# Patient Record
Sex: MALE | Race: WHITE | NOT HISPANIC OR LATINO | Employment: OTHER | URBAN - METROPOLITAN AREA
[De-identification: names, ages, dates, MRNs, and addresses within clinical notes are randomized per-mention and may not be internally consistent; named-entity substitution may affect disease eponyms.]

---

## 2017-01-12 ENCOUNTER — OFFICE VISIT (OUTPATIENT)
Dept: SLEEP MEDICINE | Facility: CLINIC | Age: 62
End: 2017-01-12
Attending: NURSE PRACTITIONER
Payer: COMMERCIAL

## 2017-01-12 VITALS
WEIGHT: 256 LBS | OXYGEN SATURATION: 95 % | SYSTOLIC BLOOD PRESSURE: 141 MMHG | HEIGHT: 70 IN | BODY MASS INDEX: 36.65 KG/M2 | HEART RATE: 81 BPM | DIASTOLIC BLOOD PRESSURE: 74 MMHG

## 2017-01-12 DIAGNOSIS — G47.33 OSA (OBSTRUCTIVE SLEEP APNEA): Primary | ICD-10-CM

## 2017-01-12 DIAGNOSIS — E66.9 OBESITY (BMI 35.0-39.9 WITHOUT COMORBIDITY): ICD-10-CM

## 2017-01-12 DIAGNOSIS — I10 ESSENTIAL HYPERTENSION: ICD-10-CM

## 2017-01-12 PROCEDURE — 99211 OFF/OP EST MAY X REQ PHY/QHP: CPT | Mod: ZF

## 2017-01-12 NOTE — NURSING NOTE
"Chief Complaint   Patient presents with     RECHECK     Follow up cpap       Initial There were no vitals taken for this visit. Estimated body mass index is 34.72 kg/(m^2) as calculated from the following:    Height as of 5/4/16: 1.778 m (5' 10\").    Weight as of 5/4/16: 109.77 kg (242 lb).  BP completed using cuff size: large left arm    BRO Thomas          "

## 2017-01-12 NOTE — PATIENT INSTRUCTIONS
Until you are seen again in clinic in the next 18 months, please watch for a return of sleepiness  is usually due to a change in the followin) a decrease in usage of cpap  2) decrease in amount of time slept  3) a poor seal (often a function of not following cleaning recommendations or  replacement guidelines)  4)  increase in weight or use of sedating medications resulting in higher pressure needs     If you have tried to address these issues but are still sleepy, please call for an earlier appointment.    Please, do not drive if sleepy    Your BMI is Body mass index is 36.73 kg/(m^2).- step up exercise  Weight management is a personal decision.  If you are interested in exploring weight loss strategies, the following discussion covers the approaches that may be successful. Body mass index (BMI) is one way to tell whether you are at a healthy weight, overweight, or obese. It measures your weight in relation to your height.  A BMI of 18.5 to 24.9 is in the healthy range. A person with a BMI of 25 to 29.9 is considered overweight, and someone with a BMI of 30 or greater is considered obese. More than two-thirds of American adults are considered overweight or obese.  Being overweight or obese increases the risk for further weight gain. Excess weight may lead to heart disease and diabetes.  Creating and following plans for healthy eating and physical activity may help you improve your health.  Weight control is part of healthy lifestyle and includes exercise, emotional health, and healthy eating habits. Careful eating habits lifelong are the mainstay of weight control. Though there are significant health benefits from weight loss, long-term weight loss with diet alone may be very difficult to achieve- studies show long-term success with dietary management in less than 10% of people. Attaining a healthy weight may be especially difficult to achieve in those with severe obesity. In some cases, medications,  devices and surgical management might be considered.  What can you do?  If you are overweight or obese and are interested in methods for weight loss, you should discuss this with your provider.     Consider reducing daily calorie intake by 500 calories.     Keep a food journal.     Avoiding skipping meals, consider cutting portions instead.    Diet combined with exercise helps maintain muscle while optimizing fat loss. Strength training is particularly important for building and maintaining muscle mass. Exercise helps reduce stress, increase energy, and improves fitness. Increasing exercise without diet control, however, may not burn enough calories to loose weight.       Start walking three days a week 10-20 minutes at a time    Work towards walking thirty minutes five days a week     Eventually, increase the speed of your walking for 1-2 minutes at time    In addition, we recommend that you review healthy lifestyles and methods for weight loss available through the National Institutes of Health patient information sites:  http://win.niddk.nih.gov/publications/index.htm    And look into health and wellness programs that may be available through your health insurance provider, employer, local community center, or kristin club.    Weight management plan: Patient was referred to their PCP to discuss a diet and exercise plan.

## 2017-01-12 NOTE — PROGRESS NOTES
The patient has an annual checkup for his supplies for his obstructive sleep apnea.  He was studied in 10/09/2007 and was noted to have an AHI of 51, a supine AHI of 60.4 and the minimum O2 sat of 58%.  His PLM arousal index was 1.9.  On 10/28/2007, he had a titration study.  His residual AHI was 22.2, but he had significant problems with leak.  His lowest O2 sat was less than 84% with a mean of 94%.  He was placed on auto PAP therapy at that time and eventually settled on a set pressure of 13 cm of water.  He presents here today on his second CPAP set at a set pressure of 13 and wishing to have a prescription since he has moved here from [facility].    His download today does show 28 out of 30 days of use, 2 days with no use; however, he states that that was on his old machine which he has been keeping at his other home until their home was sold.  Average use days used is 8 hours and 24 minutes.  Time in large leak 21 minutes.  Average AHI 0.7.  He is currently on a CPAP pressure of 13.          REVIEW OF SLEEP STUDY:  He weighed 228 pounds following his sleep follow up and at his current visit today 239 pounds with a BMI of 34.36.        ASSESSMENT AND PLAN:  It is my impression that Mr. Marquis is doing quite well on his CPAP therapy, in fact he cannot sleep without this.  I have ordered for him to obtain his equipment from [facility] again since they have been handling his equipment since he has been placed on CPAP and he is happy with their service.  He also wishes to have followup at the [place] office since he does not live on this side of town and that is perfectly fine and well.  I have encouraged him to look for signs of loss of benefit of CPAP, obstructive sleep apnea coverage and the following plan of care has been developed.    1.  Obstructive sleep apnea, severe.  Well covered with his current CPAP set the setting of 13 and will follow up in [place] in 1 year.  [facility] as his DME and he has  a prescription for their services.  I have encouraged him to look for loss of benefit and if he is not able to remedy some of these conditions such as insufficient sleep or return of symptoms of obstructive sleep apnea syndrome he is to contact us.    2.  Obesity.  We did discuss the link between obesity and the development of sleep apnea as well as higher pressures needed to treat this condition.  He is encouraged to work with his primary care provider to address this, especially since he will be retired now.  I have provided her recommendation of the [facility] to see if he would be interested in their services and he is not ready at this time.            HPI: severe moderate ,mild GREGORY.  Baseline symptoms were:   .  PSG/HSAT  Comorbidities: see below.  Sleep comorbidities     New concerns: new prescription mask, headgear (and hard copy) wisp    Sleep Review of Systems:     Snoring, snort arousals, gasping while on therapy: no     Bedpartner c/o's of cpap: no     Heated humidity problems with rainout/excessive dryness, sore throat: hh needs water     Opening of mouth while on therapy/excessive leak: no     Swallowing air: no     Skin problems: no     Insufficient sleep, devoting <7 or more hours to sleep: no     Problems falling or staying asleep with cpap: no     RLS: no    SLEEP SCHEDULE:    Naps: sometimes    Response to therapy:good sleep  Willingness to continue: yes    Equipment issues: no    Download today:    Full report scanned into medical record        A/P:      1. GREGORY  2. Obesity:  3. Hypertension pcp    Time spent with patient is 25 minutes, of which >50% was spent in counseling, education and coordination of care.

## 2017-01-14 NOTE — PROGRESS NOTES
SLEEP MEDICINE CLINIC       DATE OF SLEEP CLINIC VISIT:  01/12/2017.      LOCATION:  [facility].       CHIEF COMPLAINT:  Werner Alvarez is returning for his annual evaluation of his obstructive sleep apnea and to further develop a plan of care.      HISTORY OF PRESENT ILLNESS:  Mr. Alvarez is a 61-year-old gentleman with a previous polysomnogram done in 10/2007 noted to have an AHI of 51, supine AHI of 60 and lowest O2 sat of 58%.  On 10/28/2007 he had a titration study.  His residual AHI was 22.2, but he had significant problems with leak.  He was placed on auto Pap and at that time and eventually settled on a set pressure of 13 cm.  He has had problems with obesity.  I have provided him with a recommendation to see if the [place] [facility] could help him with weight loss.  He returns here today for request for new prescription for supplies currently through [facility] and then to get a hard copy.   New concerns: new prescription mask, headgear (and hard copy) wisp    Sleep Review of Systems:     Snoring, snort arousals, gasping while on therapy: no     Bedpartner c/o's of cpap: no     Heated humidity problems with rainout/excessive dryness, sore throat: hh needs water     Opening of mouth while on therapy/excessive leak: no     Swallowing air: no     Skin problems: no     Insufficient sleep, devoting <7 or more hours to sleep: no     Problems falling or staying asleep with cpap: no     RLS: no    SLEEP SCHEDULE:    Naps: sometimes    Response to therapy:good sleep  Willingness to continue: yes    Equipment issues: no    Allergies:    Allergies   Allergen Reactions     Penicillins Unknown     Since childhood       Medications:    Current Outpatient Prescriptions   Medication Sig Dispense Refill     sildenafil (VIAGRA) 100 MG tablet Take 0.5-1 tablets ( mg) by mouth daily as needed for erectile dysfunction Take 30 min to 4 hours before intercourse. 6  "tablet 11     order for DME Use your CPAP device as directed by your provider.         Problem List:  Patient Active Problem List    Diagnosis Date Noted     Erectile dysfunction, unspecified erectile dysfunction type 09/30/2016     Priority: Medium     Obesity (BMI 30.0-34.9) 07/29/2015     Priority: Medium     Obstructive sleep apnea 07/29/2015     Priority: Medium     Problem list name updated by automated process. Provider to review       Preventive measure 05/04/2016     Hx of yearly CPE; not yearly PSA; 2.66 in 5/16;                     Colonoscopy neg in 2015; recheck in 10 yrs.          Tobacco abuse 05/04/2016     Off and on for many years; quit 2014.      approx 20 pack yrs          Past Medical/Surgical History:  History reviewed. No pertinent past medical history.  Past Surgical History   Procedure Laterality Date     Arthroscopy knee rt/lt Bilateral            Physical Examination:  Vitals: /74 mmHg  Pulse 81  Ht 1.778 m (5' 10\")  Wt 116.121 kg (256 lb)  BMI 36.73 kg/m2  SpO2 95%  BMI= Body mass index is 36.73 kg/(m^2).         [place] Total Score 1/12/2017   Total score - [place] 1     GENERAL APPEARANCE: healthy, alert and no distress     Download today shows an AHI residual of 0.6 on a CPAP at 13 cm of water.  Average usage on days used 8 hours and 47 minutes.  Average time in large leak is 6 seconds.  Use appears to be excellent.      ASSESSMENT AND PLAN:  It is my impression that Mr. Alvarez is having an excellent response and showing high compliance with his CPAP therapy.  I have agreed to see him back in the next 18 months and the following plan of care has been developed:   1.  Obstructive sleep apnea, severe.  Followup in 18-months.  He has been given a set of recommendations for further trial should he have a return of sleepiness.  If he is not able to improve this situation on his own, he is to call for an earlier appointment.  He is also instructed to not drive if sleepy.   2. "  Obesity.  I have suggested he find a resources through which he would like to exercise and stick with that at this time.  His weight has gone up over the last time he was seen by about 17 pounds.   3.  Hypertension.  His blood pressure is just up slightly today and I will update Dr. Zavala.      Thank you for allowing me to participate in this kind man's care.      Time spent with patient 25 minutes, of which greater than 50% was spent in counseling, education and coordination of care.         RONNIE DOCKERY, CNP             D: 2017 18:10   T: 2017 11:37   MT: JONEL      Name:     MIKE CRAWFORD   MRN:      [id number]        Account:      [id number]   :      1955           Visit Date:   2017      Document: [id number]

## 2017-01-17 ENCOUNTER — DOCUMENTATION ONLY (OUTPATIENT)
Dept: SLEEP MEDICINE | Facility: CLINIC | Age: 62
End: 2017-01-17

## 2017-01-17 NOTE — PROGRESS NOTES
Patient was offered choice of vendor and chose [facility].  Patient Werner Alvarez was set up at [place] on January 17, 2017. Patient received a Resmed AirSense 10 CPAP. Pressures were set at 13 cm H2O.   Patient s ramp is 7 cm H2O for Off and FLEX/EPR is EPR.  Patient received a [employer] Mask name: WISP  Nasal mask Size Large, heated tubing and heated humidifier.  Patient is not enrolled in the STM Program and does need to meet compliance. Patient has a follow up on 03/02/2017 with Elaine Cruz NP.    Romario Sidhu

## 2017-01-18 ENCOUNTER — DOCUMENTATION ONLY (OUTPATIENT)
Dept: SLEEP MEDICINE | Facility: CLINIC | Age: 62
End: 2017-01-18

## 2017-01-18 NOTE — PROGRESS NOTES
Werner Alvarez was set up with PAP equipment by [facility] and is enrolled in [facility].  See previous documentation by [facility] for equipment and supply details.

## 2017-01-23 ENCOUNTER — DOCUMENTATION ONLY (OUTPATIENT)
Dept: SLEEP MEDICINE | Facility: CLINIC | Age: 62
End: 2017-01-23

## 2017-01-23 NOTE — PROGRESS NOTES
3 DAY STM VISIT    Patient contacted for 3 day STM visit  Subjective measures:  Pt reports things are going very well so far.  No issues with dryness, pressure or mask discomfort.     Current settings: 13 cm H20       Assessment:  Nightly usage over four hours.   Action plan: Pt to have f/u 14 day  STM visit.

## 2017-02-02 ENCOUNTER — DOCUMENTATION ONLY (OUTPATIENT)
Dept: SLEEP MEDICINE | Facility: CLINIC | Age: 62
End: 2017-02-02

## 2017-02-02 NOTE — PROGRESS NOTES
14 DAY STM VISIT    Subjective measures:  Pt states things are going well and has no issues or complaints.  Pt is benefiting from therapy.    Assessment: Pt meeting objective benchmarks.  Patient meeting subjective benchmarks.   Action plan: Pt to have 30 day STM visit.   Device settings:  CPAP: 13 cm H2O       Objective measures: 14 day rolling measure      Compliance   (Goal >70%)  --% compliance greater than four hours rolling average 14 days: 85.7%      Leak  (Goal < 24 lpm)  --95% OF Leak in litres Rolling Average 14 Days: 17.17 lpm last data upload         AHI  (Goal < 5)  --AHI Rolling Average 14 Day: 1.68  last data upload         Usage  (Goal >240)  --Time mask on face 14 day average: 460 min

## 2017-02-20 ENCOUNTER — DOCUMENTATION ONLY (OUTPATIENT)
Dept: SLEEP MEDICINE | Facility: CLINIC | Age: 62
End: 2017-02-20

## 2017-02-20 NOTE — PROGRESS NOTES
[facility]    Message left for patient to return call     Assessment: Pt meeting objective benchmarks.     Action plan: Waiting for patient to return call and Pt to have 6 month STM visit  Patient has a follow up visit with Elaine Cruz NP on 3/2/17.   Device settings:  CPAP:[id number]    Objective measures: 14 day rolling measures     Compliance   (Goal >70%)  --% compliance greater than four hours rolling average 14 days: 100 %      Leak   (Goal < 24 lpm) --95% OF Leak in litres Rolling Average 14 Days: 21.2 lpm  last data upload      AHI  (Goal < 5)  --AHI Rolling Average 14 Day: 1.07 last data upload        Usage  (Goal >240)  --Time mask on face 14 day average: 526 min

## 2017-03-02 ENCOUNTER — OFFICE VISIT (OUTPATIENT)
Dept: SLEEP MEDICINE | Facility: CLINIC | Age: 62
End: 2017-03-02
Attending: NURSE PRACTITIONER
Payer: COMMERCIAL

## 2017-03-02 VITALS
HEART RATE: 90 BPM | RESPIRATION RATE: 20 BRPM | DIASTOLIC BLOOD PRESSURE: 69 MMHG | HEIGHT: 70 IN | OXYGEN SATURATION: 96 % | BODY MASS INDEX: 37.37 KG/M2 | SYSTOLIC BLOOD PRESSURE: 130 MMHG | WEIGHT: 261 LBS

## 2017-03-02 DIAGNOSIS — E66.811 OBESITY (BMI 30.0-34.9): ICD-10-CM

## 2017-03-02 DIAGNOSIS — G47.33 OBSTRUCTIVE SLEEP APNEA: Primary | ICD-10-CM

## 2017-03-02 NOTE — NURSING NOTE
"Chief Complaint   Patient presents with     RECHECK     Follow up cpap       Initial Ht 1.778 m (5' 10\")  Wt 118.4 kg (261 lb)  BMI 37.45 kg/m2 Estimated body mass index is 37.45 kg/(m^2) as calculated from the following:    Height as of this encounter: 1.778 m (5' 10\").    Weight as of this encounter: 118.4 kg (261 lb).  Medication Reconciliation: complete     BRO Thomas        "

## 2017-03-02 NOTE — PATIENT INSTRUCTIONS
Until you are seen again in clinic in the next 12 months, please watch for a return of sleepiness is usually due to a change in the followin) a decrease in usage of cpap  2) decrease in amount of time slept  3) a poor seal (often a function of not following cleaning recommendations or  replacement guidelines)  4)  increase in weight or use of sedating medications resulting in higher pressure needs     If you have tried to address these issues but are still sleepy, please call for an earlier appointment.    Please, do not drive if sleepy    Your BMI is Body mass index is 37.45 kg/(m^2).  Weight management is a personal decision.  If you are interested in exploring weight loss strategies, the following discussion covers the approaches that may be successful. Body mass index (BMI) is one way to tell whether you are at a healthy weight, overweight, or obese. It measures your weight in relation to your height.  A BMI of 18.5 to 24.9 is in the healthy range. A person with a BMI of 25 to 29.9 is considered overweight, and someone with a BMI of 30 or greater is considered obese. More than two-thirds of American adults are considered overweight or obese.  Being overweight or obese increases the risk for further weight gain. Excess weight may lead to heart disease and diabetes.  Creating and following plans for healthy eating and physical activity may help you improve your health.  Weight control is part of healthy lifestyle and includes exercise, emotional health, and healthy eating habits. Careful eating habits lifelong are the mainstay of weight control. Though there are significant health benefits from weight loss, long-term weight loss with diet alone may be very difficult to achieve- studies show long-term success with dietary management in less than 10% of people. Attaining a healthy weight may be especially difficult to achieve in those with severe obesity. In some cases, medications, devices and surgical  management might be considered.  What can you do?  If you are overweight or obese and are interested in methods for weight loss, you should discuss this with your provider.     Consider reducing daily calorie intake by 500 calories.     Keep a food journal.     Avoiding skipping meals, consider cutting portions instead.    Diet combined with exercise helps maintain muscle while optimizing fat loss. Strength training is particularly important for building and maintaining muscle mass. Exercise helps reduce stress, increase energy, and improves fitness. Increasing exercise without diet control, however, may not burn enough calories to loose weight.       Start walking three days a week 10-20 minutes at a time    Work towards walking thirty minutes five days a week     Eventually, increase the speed of your walking for 1-2 minutes at time    In addition, we recommend that you review healthy lifestyles and methods for weight loss available through the National Institutes of Health patient information sites:  http://win.niddk.nih.gov/publications/index.htm    And look into health and wellness programs that may be available through your health insurance provider, employer, local community center, or [employer]ristin club.    Weight management plan: Patient was referred to their PCP to discuss a diet and exercise plan.

## 2017-03-02 NOTE — PROGRESS NOTES
"Allergies:    Allergies   Allergen Reactions     Penicillins Unknown     Since childhood       Medications:    Current Outpatient Prescriptions   Medication Sig Dispense Refill     order for DME [id number] CPAP  13 CM H2O  WISP NASAL MASK (LARGE)       sildenafil (VIAGRA) 100 MG tablet Take 0.5-1 tablets ( mg) by mouth daily as needed for erectile dysfunction Take 30 min to 4 hours before intercourse. 6 tablet 11     order for DME Use your CPAP device as directed by your provider.         Problem List:  Patient Active Problem List    Diagnosis Date Noted     Erectile dysfunction, unspecified erectile dysfunction type 09/30/2016     Priority: Medium     Obesity (BMI 30.0-34.9) 07/29/2015     Priority: Medium     Obstructive sleep apnea 07/29/2015     Priority: Medium     Problem list name updated by automated process. Provider to review       Preventive measure 05/04/2016     Hx of yearly CPE; not yearly PSA; 2.66 in 5/16;                     Colonoscopy neg in 2015; recheck in 10 yrs.          Tobacco abuse 05/04/2016     Off and on for many years; quit 2014.      approx 20 pack yrs          Past Medical/Surgical History:  No past medical history on file.  Past Surgical History   Procedure Laterality Date     Arthroscopy knee rt/lt Bilateral            Physical Examination:  Vitals: /69 (BP Location: Right arm, Patient Position: Supine, Cuff Size: Adult Large)  Pulse 90  Resp 20  Ht 1.778 m (5' 10\")  Wt 118.4 kg (261 lb)  SpO2 96%  BMI 37.45 kg/m2  BMI= Body mass index is 37.45 kg/(m^2).         [place] Total Score 3/2/2017   Total score - [place] 0       GENERAL APPEARANCE: healthy, alert and no distress    "

## 2017-03-02 NOTE — MR AVS SNAPSHOT
After Visit Summary   3/2/2017    Werner Alvarez    MRN: [id number]           Patient Information     Date Of Birth          1955        Visit Information        Provider Department      3/2/2017 2:30 PM Elaine Davila APRN [facility], [place], Sleep Study        Today's Diagnoses     Obstructive sleep apnea    -  1    Obesity (BMI 30.0-34.9)          Care Instructions      Until you are seen again in clinic in the next 12 months, please watch for a return of sleepiness is usually due to a change in the followin) a decrease in usage of cpap  2) decrease in amount of time slept  3) a poor seal (often a function of not following cleaning recommendations or  replacement guidelines)  4)  increase in weight or use of sedating medications resulting in higher pressure needs     If you have tried to address these issues but are still sleepy, please call for an earlier appointment.    Please, do not drive if sleepy    Your BMI is Body mass index is 37.45 kg/(m^2).  Weight management is a personal decision.  If you are interested in exploring weight loss strategies, the following discussion covers the approaches that may be successful. Body mass index (BMI) is one way to tell whether you are at a healthy weight, overweight, or obese. It measures your weight in relation to your height.  A BMI of 18.5 to 24.9 is in the healthy range. A person with a BMI of 25 to 29.9 is considered overweight, and someone with a BMI of 30 or greater is considered obese. More than two-thirds of American adults are considered overweight or obese.  Being overweight or obese increases the risk for further weight gain. Excess weight may lead to heart disease and diabetes.  Creating and following plans for healthy eating and physical activity may help you improve your health.  Weight control is part of healthy lifestyle and includes exercise, emotional health, and healthy eating habits. Careful eating habits  lifelong are the mainstay of weight control. Though there are significant health benefits from weight loss, long-term weight loss with diet alone may be very difficult to achieve- studies show long-term success with dietary management in less than 10% of people. Attaining a healthy weight may be especially difficult to achieve in those with severe obesity. In some cases, medications, devices and surgical management might be considered.  What can you do?  If you are overweight or obese and are interested in methods for weight loss, you should discuss this with your provider.     Consider reducing daily calorie intake by 500 calories.     Keep a food journal.     Avoiding skipping meals, consider cutting portions instead.    Diet combined with exercise helps maintain muscle while optimizing fat loss. Strength training is particularly important for building and maintaining muscle mass. Exercise helps reduce stress, increase energy, and improves fitness. Increasing exercise without diet control, however, may not burn enough calories to loose weight.       Start walking three days a week 10-20 minutes at a time    Work towards walking thirty minutes five days a week     Eventually, increase the speed of your walking for 1-2 minutes at time    In addition, we recommend that you review healthy lifestyles and methods for weight loss available through the National Institutes of Health patient information sites:  http://win.niddk.nih.gov/publications/index.htm    And look into health and wellness programs that may be available through your health insurance provider, employer, local community center, or kristin club.    Weight management plan: Patient was referred to their PCP to discuss a diet and exercise plan.            Follow-ups after your visit        Follow-up notes from your care team     Return in about 1 year (around 3/2/2018).      Who to contact     If you have questions or need follow up information about today's  "clinic visit or your schedule please contact [facility] directly at [phone].  Normal or non-critical lab and imaging results will be communicated to you by MyChart, letter or phone within 4 business days after the clinic has received the results. If you do not hear from us within 7 days, please contact the clinic through Vedant[employer] or phone. If you have a critical or abnormal lab result, we will notify you by phone as soon as possible.  Submit refill requests through Quixby or call your pharmacy and they will forward the refill request to us. Please allow 3 business days for your refill to be completed.          Additional Information About Your Visit        [employer] Information     Qu[employer] gives you secure access to your electronic health record. If you see a primary care provider, you can also send messages to your care team and make appointments. If you have questions, please call your primary care clinic.  If you do not have a primary care provider, please call [phone] and they will assist you.        Care EveryWhere ID     This is your Care EveryWhere ID. This could be used by other organizations to access your [place] medical records  [id number]        Your Vitals Were     Pulse Respirations Height Pulse Oximetry BMI (Body Mass Index)       90 20 1.778 m (5' 10\") 96% 37.45 kg/m2        Blood Pressure from Last 3 Encounters:   03/02/17 130/69   01/12/17 141/74   05/04/16 108/68    Weight from Last 3 Encounters:   03/02/17 118.4 kg (261 lb)   01/12/17 116.1 kg (256 lb)   05/04/16 109.8 kg (242 lb)              Today, you had the following     No orders found for display       Primary Care Provider Office Phone # Fax #    Guzman Zavala [phone]       [facility] [address]  [facility] [address]        Thank you!     Thank you for choosing [facility]  for your care. Our goal is always to provide you with excellent care. " Hearing back from our patients is one way we can continue to improve our services. Please take a few minutes to complete the written survey that you may receive in the mail after your visit with us. Thank you!             Your Updated Medication List - Protect others around you: Learn how to safely use, store and throw away your medicines at www.disposemymeds.org.          This list is accurate as of: 3/2/17 11:59 PM.  Always use your most recent med list.                   Brand Name Dispense Instructions for use    order for DME      Use your CPAP device as directed by your provider.       order for DME      [id number] CPAP 13 CM H2O WISP NASAL MASK (LARGE)       sildenafil 100 MG cap/tab    VIAGRA    6 tablet    Take 0.5-1 tablets ( mg) by mouth daily as needed for erectile dysfunction Take 30 min to 4 hours before intercourse.

## 2017-03-03 NOTE — PROGRESS NOTES
DATE OF SERVICE:  03/02/2017      [facility]      CHIEF COMPLAINT:  The patient returns today to evaluate compliance and response to CPAP therapy.      HISTORY OF PRESENT ILLNESS:  Mr. Werner Alvarez is a 61-year-old male with a 10/2007 polysomnogram revealing an AHI of 51, supine AHI of 60 and lowest O2 sat of 58%.  On 10/28/2007 he had a titration study.  His residual AHI was 22.2, but he had significant problems with leak.  Previous DME was [employer], but chose to [facility] for his new Apap at a set pressure of 13 cm of water set up on 01/172017. He was seen by me on 1/12/17 without difficulties with therapy.  Returns here today to evaluate compliance and response to CPAP.  He has no complaints of insufficient pressure, problems with congestion or humidification  Sleep schedule was quite regular.  Rare naps.  Sleep is good, and he was willing to continue CPAP therapy for his severe obstructive sleep apnea.      Download today:  In the past 30 days 100% of these days shows use greater than 4 hours.  Average use on days used 8 hours and 31 minutes.  He is on CPAP at a set pressure of 13 cm with a residual AHI of 1.2.  Leaks at the 95th percentile were 21.8.  I review a detailed report that shows some minimal spikes of leak which appear to be with change of position and is not a problem with his comfort.     Allergies:    Allergies   Allergen Reactions     Penicillins Unknown     Since childhood       Medications:    Current Outpatient Prescriptions   Medication Sig Dispense Refill     order for DME [id number] CPAP  13 CM H2O  WISP NASAL MASK (LARGE)       sildenafil (VIAGRA) 100 MG tablet Take 0.5-1 tablets ( mg) by mouth daily as needed for erectile dysfunction Take 30 min to 4 hours before intercourse. 6 tablet 11     order for DME Use your CPAP device as directed by your provider.         Problem List:  Patient Active Problem List    Diagnosis Date Noted     Erectile  "dysfunction, unspecified erectile dysfunction type 09/30/2016     Priority: Medium     Obesity (BMI 30.0-34.9) 07/29/2015     Priority: Medium     Obstructive sleep apnea 07/29/2015     Priority: Medium     Problem list name updated by automated process. Provider to review       Preventive measure 05/04/2016     Hx of yearly CPE; not yearly PSA; 2.66 in 5/16;                     Colonoscopy neg in 2015; recheck in 10 yrs.          Tobacco abuse 05/04/2016     Off and on for many years; quit 2014.      approx 20 pack yrs          Past Medical/Surgical History:  No past medical history on file.  Past Surgical History   Procedure Laterality Date     Arthroscopy knee rt/lt Bilateral            Physical Examination:  Vitals: /69 (BP Location: Right arm, Patient Position: Supine, Cuff Size: Adult Large)  Pulse 90  Resp 20  Ht 1.778 m (5' 10\")  Wt 118.4 kg (261 lb)  SpO2 96%  BMI 37.45 kg/m2  BMI= Body mass index is 37.45 kg/(m^2).         Wideman Total Score 3/2/2017   Total score - Wideman 0       GENERAL APPEARANCE: healthy, alert and no distress    ASSESSMENT AND PLAN:  It is my impression that Mr. Alvarez is having excellent response with treatment of his obstructive sleep apnea.  He will follow up with me in the next in 12 months' time, sooner if he has problems with return of daytime sleepiness or other symptoms that would indicate insufficient coverage of his apnea.   1.  Obstructive sleep apnea, severe, with excellent coverage of his apnea.   2.  Obesity.  He was recently given a referral to [facility].  Unfortunately he has had a slight increase in weight since that time and is encouraged to return to his exercise routine and to seek their guidance.        Time spent with patient 25 minutes, of which greater than 50% was spent in counseling, education and coordination of care.         RONNIE DOCKERY, CNP             D: 03/03/2017 07:34   T: 03/03/2017 07:50   MT: sandie      Name:    "  MIKE CRAWFORD   MRN:      [id number]        Account:      [id number]   :      1955           Visit Date:   2017      Document: [id number]

## 2017-06-27 ENCOUNTER — OFFICE VISIT (OUTPATIENT)
Dept: FAMILY MEDICINE | Facility: CLINIC | Age: 62
End: 2017-06-27
Payer: COMMERCIAL

## 2017-06-27 VITALS
SYSTOLIC BLOOD PRESSURE: 124 MMHG | BODY MASS INDEX: 34.07 KG/M2 | OXYGEN SATURATION: 96 % | DIASTOLIC BLOOD PRESSURE: 66 MMHG | RESPIRATION RATE: 20 BRPM | WEIGHT: 238 LBS | TEMPERATURE: 98.8 F | HEART RATE: 75 BPM | HEIGHT: 70 IN

## 2017-06-27 DIAGNOSIS — N52.9 ERECTILE DYSFUNCTION, UNSPECIFIED ERECTILE DYSFUNCTION TYPE: ICD-10-CM

## 2017-06-27 DIAGNOSIS — Z29.9 PREVENTIVE MEASURE: ICD-10-CM

## 2017-06-27 DIAGNOSIS — Z00.00 ROUTINE GENERAL MEDICAL EXAMINATION AT A HEALTH CARE FACILITY: Primary | ICD-10-CM

## 2017-06-27 DIAGNOSIS — Z72.0 TOBACCO ABUSE: ICD-10-CM

## 2017-06-27 DIAGNOSIS — G47.33 OBSTRUCTIVE SLEEP APNEA: ICD-10-CM

## 2017-06-27 DIAGNOSIS — E66.811 OBESITY (BMI 30.0-34.9): ICD-10-CM

## 2017-06-27 LAB
ALBUMIN SERPL-MCNC: 4.1 G/DL (ref 3.4–5)
ALBUMIN UR-MCNC: NEGATIVE MG/DL
ALP SERPL-CCNC: 68 U/L (ref 40–150)
ALT SERPL W P-5'-P-CCNC: 52 U/L (ref 0–70)
ANION GAP SERPL CALCULATED.3IONS-SCNC: 8 MMOL/L (ref 3–14)
APPEARANCE UR: CLEAR
AST SERPL W P-5'-P-CCNC: 23 U/L (ref 0–45)
BASOPHILS # BLD AUTO: 0 10E9/L (ref 0–0.2)
BASOPHILS NFR BLD AUTO: 0.5 %
BILIRUB SERPL-MCNC: 0.7 MG/DL (ref 0.2–1.3)
BILIRUB UR QL STRIP: NEGATIVE
BUN SERPL-MCNC: 15 MG/DL (ref 7–30)
CALCIUM SERPL-MCNC: 9.3 MG/DL (ref 8.5–10.1)
CHLORIDE SERPL-SCNC: 107 MMOL/L (ref 94–109)
CHOLEST SERPL-MCNC: 183 MG/DL
CO2 SERPL-SCNC: 26 MMOL/L (ref 20–32)
COLOR UR AUTO: YELLOW
CREAT SERPL-MCNC: 0.96 MG/DL (ref 0.66–1.25)
DIFFERENTIAL METHOD BLD: NORMAL
EOSINOPHIL # BLD AUTO: 0.1 10E9/L (ref 0–0.7)
EOSINOPHIL NFR BLD AUTO: 1.8 %
ERYTHROCYTE [DISTWIDTH] IN BLOOD BY AUTOMATED COUNT: 13.7 % (ref 10–15)
GFR SERPL CREATININE-BSD FRML MDRD: 80 ML/MIN/1.7M2
GLUCOSE SERPL-MCNC: 95 MG/DL (ref 70–99)
GLUCOSE UR STRIP-MCNC: NEGATIVE MG/DL
HCT VFR BLD AUTO: 46.1 % (ref 40–53)
HDLC SERPL-MCNC: 51 MG/DL
HGB BLD-MCNC: 15.5 G/DL (ref 13.3–17.7)
HGB UR QL STRIP: NEGATIVE
KETONES UR STRIP-MCNC: NEGATIVE MG/DL
LDLC SERPL CALC-MCNC: 113 MG/DL
LEUKOCYTE ESTERASE UR QL STRIP: NEGATIVE
LYMPHOCYTES # BLD AUTO: 2.2 10E9/L (ref 0.8–5.3)
LYMPHOCYTES NFR BLD AUTO: 35.1 %
MCH RBC QN AUTO: 31.4 PG (ref 26.5–33)
MCHC RBC AUTO-ENTMCNC: 33.6 G/DL (ref 31.5–36.5)
MCV RBC AUTO: 94 FL (ref 78–100)
MONOCYTES # BLD AUTO: 0.6 10E9/L (ref 0–1.3)
MONOCYTES NFR BLD AUTO: 10.3 %
NEUTROPHILS # BLD AUTO: 3.2 10E9/L (ref 1.6–8.3)
NEUTROPHILS NFR BLD AUTO: 52.3 %
NITRATE UR QL: NEGATIVE
NONHDLC SERPL-MCNC: 132 MG/DL
PH UR STRIP: 5.5 PH (ref 5–7)
PLATELET # BLD AUTO: 221 10E9/L (ref 150–450)
POTASSIUM SERPL-SCNC: 4.1 MMOL/L (ref 3.4–5.3)
PROT SERPL-MCNC: 7.8 G/DL (ref 6.8–8.8)
PSA SERPL-ACNC: 2.86 UG/L (ref 0–4)
RBC # BLD AUTO: 4.93 10E12/L (ref 4.4–5.9)
SODIUM SERPL-SCNC: 141 MMOL/L (ref 133–144)
SP GR UR STRIP: 1.02 (ref 1–1.03)
TRIGL SERPL-MCNC: 97 MG/DL
URN SPEC COLLECT METH UR: NORMAL
UROBILINOGEN UR STRIP-ACNC: 0.2 EU/DL (ref 0.2–1)
WBC # BLD AUTO: 6.1 10E9/L (ref 4–11)

## 2017-06-27 PROCEDURE — 80061 LIPID PANEL: CPT | Performed by: INTERNAL MEDICINE

## 2017-06-27 PROCEDURE — 36415 COLL VENOUS BLD VENIPUNCTURE: CPT | Performed by: INTERNAL MEDICINE

## 2017-06-27 PROCEDURE — 81003 URINALYSIS AUTO W/O SCOPE: CPT | Performed by: INTERNAL MEDICINE

## 2017-06-27 PROCEDURE — G0103 PSA SCREENING: HCPCS | Performed by: INTERNAL MEDICINE

## 2017-06-27 PROCEDURE — 99396 PREV VISIT EST AGE 40-64: CPT | Performed by: INTERNAL MEDICINE

## 2017-06-27 PROCEDURE — 80053 COMPREHEN METABOLIC PANEL: CPT | Performed by: INTERNAL MEDICINE

## 2017-06-27 PROCEDURE — 85025 COMPLETE CBC W/AUTO DIFF WBC: CPT | Performed by: INTERNAL MEDICINE

## 2017-06-27 NOTE — NURSING NOTE
"Chief Complaint   Patient presents with     Physical       Initial /66 (BP Location: Left arm, Patient Position: Chair, Cuff Size: Adult Large)  Pulse 75  Temp 98.8  F (37.1  C)  Resp 20  Ht 5' 10\" (1.778 m)  Wt 238 lb (108 kg)  SpO2 96%  BMI 34.15 kg/m2 Estimated body mass index is 34.15 kg/(m^2) as calculated from the following:    Height as of this encounter: 5' 10\" (1.778 m).    Weight as of this encounter: 238 lb (108 kg).  Medication Reconciliation: complete   Madonna Burton LPN  "

## 2017-06-27 NOTE — PATIENT INSTRUCTIONS
I will let you know your lab results.              Please resume taking aspirin 81 mg per day.   Please call and set up the CT scan of your chest.

## 2017-06-27 NOTE — MR AVS SNAPSHOT
After Visit Summary   6/27/2017    Werner Alvarez    MRN: [id number]           Patient Information     Date Of Birth          1955        Visit Information        Provider Department      6/27/2017 7:30 AM Guzman Zavala MD [facility]        Today's Diagnoses     Routine general medical examination at a health care facility    -  1    Tobacco abuse        Erectile dysfunction, unspecified erectile dysfunction type        Obesity (BMI 30.0-34.9)          Care Instructions    I will let you know your lab results.              Please resume taking aspirin 81 mg per day.   Please call and set up the CT scan of your chest.           Follow-ups after your visit        Additional Services     RADIOLOGY REFERRAL       Your provider has referred you to: [facility]: [facility] [phone]   [email or web address]                Please do a screening CT of the chest; no contrast.         Long time smoker.                         Please be aware that coverage of these services is subject to the terms and limitations of your health insurance plan.  Call member services at your health plan with any benefit or coverage questions.      Please bring the following to your appointment:    >>   Any x-rays, CTs or MRIs which have been performed.  Contact the facility where they were done to arrange for  prior to your scheduled appointment.    >>   List of current medications   >>   This referral request   >>   Any documents/labs given to you for this referral    Prior authorization is required for MRI/MRA, CT, Dexa Scans and Worker's Compensation cases.                  Who to contact     If you have questions or need follow up information about today's clinic visit or your schedule please contact [facility] directly at [phone].  Normal or non-critical lab and imaging results will be communicated to you by MyChart, letter or phone  "within 4 business days after the clinic has received the results. If you do not hear from us within 7 days, please contact the clinic through [employer] or phone. If you have a critical or abnormal lab result, we will notify you by phone as soon as possible.  Submit refill requests through [employer] or call your pharmacy and they will forward the refill request to us. Please allow 3 business days for your refill to be completed.          Additional Information About Your Visit        [employer] Information     [employer] gives you secure access to your electronic health record. If you see a primary care provider, you can also send messages to your care team and make appointments. If you have questions, please call your primary care clinic.  If you do not have a primary care provider, please call [phone] and they will assist you.        Care EveryWhere ID     This is your Care EveryWhere ID. This could be used by other organizations to access your [place] medical records  [id number]        Your Vitals Were     Pulse Temperature Respirations Height Pulse Oximetry BMI (Body Mass Index)    75 98.8  F (37.1  C) 20 5' 10\" (1.778 m) 96% 34.15 kg/m2       Blood Pressure from Last 3 Encounters:   06/27/17 124/66   03/02/17 130/69   01/12/17 141/74    Weight from Last 3 Encounters:   06/27/17 238 lb (108 kg)   03/02/17 261 lb (118.4 kg)   01/12/17 256 lb (116.1 kg)              We Performed the Following     Comprehensive metabolic panel (BMP + Alb, Alk Phos, ALT, AST, Total. Bili, TP)     Lipid Profile (Chol, Trig, HDL, LDL calc)     PSA, screen     RADIOLOGY REFERRAL     UA reflex to Microscopic        Primary Care Provider Office Phone # Fax #    Guzman Zavala [phone]       [facility] [address] [facility] [address]        Equal Access to Services     ANGELIA ZHENG AH: Hadii katia ku hadasho Soomaali, waaxda luqadaha, qaybta kaalmada adeegyada, carolina mcgarry " lamarixa sanchez. So [facility] [phone].    ATENCIÓN: Si habla nancie, tiene a quiroz disposición servicios gratuitos de asistencia lingüística. Chris al [phone].    We comply with applicable federal civil rights laws and [place] laws. We do not discriminate on the basis of race, color, national origin, age, disability sex, sexual orientation or gender identity.            Thank you!     Thank you for choosing [facility]  for your care. Our goal is always to provide you with excellent care. Hearing back from our patients is one way we can continue to improve our services. Please take a few minutes to complete the written survey that you may receive in the mail after your visit with us. Thank you!             Your Updated Medication List - Protect others around you: Learn how to safely use, store and throw away your medicines at www.disposemymeds.org.          This list is accurate as of: 6/27/17  7:54 AM.  Always use your most recent med list.                   Brand Name Dispense Instructions for use Diagnosis    order for DME      Use your CPAP device as directed by your provider.        order for DME      [id number] CPAP 13 CM H2O WISP NASAL MASK (LARGE)        sildenafil 100 MG cap/tab    VIAGRA    6 tablet    Take 0.5-1 tablets ( mg) by mouth daily as needed for erectile dysfunction Take 30 min to 4 hours before intercourse.    Erectile dysfunction, unspecified erectile dysfunction type

## 2017-06-27 NOTE — PROGRESS NOTES
Answers for HPI/ROS submitted by the patient on 6/26/2017   Annual Exam:  Getting at least 3 servings of Calcium per day:: Yes  Bi-annual eye exam:: NO  Dental care twice a year:: Yes  Sleep apnea or symptoms of sleep apnea:: Sleep apnea  Diet:: Regular (no restrictions)  Frequency of exercise:: None  Taking medications regularly:: Yes  Medication side effects:: Not applicable  Additional concerns today:: No  PHQ-2 Score: 0      SUBJECTIVE:     CC: Werner Alvarez is an 61 year old male who presents for preventative health visit.            losing weight; Julio's diet.        Rare use of viagra.                Not smoking.             At least a 25 yr pack hx; maybe more.                 He smoked off and on for many years; 1 ppd.                 Rare use of viagra.                   Was taking ASA; not lately.     Today's PHQ-2 Score:   PHQ-2 ( 1999 Pfizer) 6/27/2017 6/26/2017   Q1: Little interest or pleasure in doing things 0 0   Q2: Feeling down, depressed or hopeless 0 0   PHQ-2 Score 0 0   Q1: Little interest or pleasure in doing things - Not at all   Q2: Feeling down, depressed or hopeless - Not at all   PHQ-2 Score - 0       Abuse: Current or Past(Physical, Sexual or Emotional)- No  Do you feel safe in your environment - Yes    Social History   Substance Use Topics     Smoking status: Former Smoker     Packs/day: 1.00     Years: 15.00     Types: Cigarettes     Quit date: 7/29/2014     Smokeless tobacco: Never Used     Alcohol use 0.0 oz/week     0 Standard drinks or equivalent per week      Comment: Socially     The patient does not drink >3 drinks per day nor >7 drinks per week.    Last PSA:   PSA   Date Value Ref Range Status   05/04/2016 2.66 0 - 4 ug/L Final       Recent Labs   Lab Test  05/04/16   0910   CHOL  167   HDL  56   LDL  99   TRIG  58   NHDL  111       Reviewed orders with patient. Reviewed health maintenance and updated orders accordingly - Yes    Reviewed and updated as needed this visit by  clinical staff  Tobacco  Allergies  Meds  Med Hx  Surg Hx  Fam Hx  Soc Hx        Reviewed and updated as needed this visit by Provider        Patient Active Problem List    Diagnosis Date Noted     Tobacco abuse 05/04/2016     Priority: High     Off and on for many years; quit 2014.      approx 20 pack yrs       Erectile dysfunction, unspecified erectile dysfunction type 09/30/2016     Priority: Medium     Obesity (BMI 30.0-34.9) 07/29/2015     Priority: Medium     Obstructive sleep apnea 07/29/2015     Priority: Medium     Problem list name updated by automated process. Provider to review       Preventive measure 05/04/2016     Priority: Low     Hx of yearly CPE; not yearly PSA; 2.66 in 5/16;                     Colonoscopy neg in 2015; recheck in 10 yrs.              ROS:  C: NEGATIVE for fever, chills, change in weight  I: NEGATIVE for worrisome rashes, moles or lesions  E: NEGATIVE for vision changes or irritation  ENT: NEGATIVE for ear, mouth and throat problems  R: NEGATIVE for significant cough or SOB  CV: NEGATIVE for chest pain, palpitations or peripheral edema  GI: NEGATIVE for nausea, abdominal pain, heartburn, or change in bowel habits   male: erectile dysfunction  M: NEGATIVE for significant arthralgias or myalgia  N: NEGATIVE for weakness, dizziness or paresthesias  P: NEGATIVE for changes in mood or affect    BP Readings from Last 3 Encounters:   06/27/17 124/66   03/02/17 130/69   01/12/17 141/74    Wt Readings from Last 3 Encounters:   06/27/17 238 lb (108 kg)   03/02/17 261 lb (118.4 kg)   01/12/17 256 lb (116.1 kg)                  Current Outpatient Prescriptions   Medication Sig Dispense Refill     order for DME [id number] CPAP  13 CM H2O  WISP NASAL MASK (LARGE)       sildenafil (VIAGRA) 100 MG tablet Take 0.5-1 tablets ( mg) by mouth daily as needed for erectile dysfunction Take 30 min to 4 hours before intercourse. 6 tablet 11     order for DME Use your CPAP device as directed  "by your provider.       Allergies   Allergen Reactions     Penicillins Unknown     Since childhood     OBJECTIVE:     /66 (BP Location: Left arm, Patient Position: Chair, Cuff Size: Adult Large)  Pulse 75  Temp 98.8  F (37.1  C)  Resp 20  Ht 5' 10\" (1.778 m)  Wt 238 lb (108 kg)  SpO2 96%  BMI 34.15 kg/m2  EXAM:  GENERAL: healthy, alert, no distress and over weight  EYES: Eyes grossly normal to inspection, PERRL and conjunctivae and sclerae normal  HENT: ear canals and TM's normal, nose and mouth without ulcers or lesions  NECK: no adenopathy, no asymmetry, masses, or scars and thyroid normal to palpation  RESP: lungs clear to auscultation - no rales, rhonchi or wheezes  CV: regular rate and rhythm, normal S1 S2, no S3 or S4, no murmur, click or rub, no peripheral edema and peripheral pulses strong  ABDOMEN: soft, nontender, no hepatosplenomegaly, no masses and bowel sounds normal   (male): normal male genitalia without lesions or urethral discharge, no hernia  RECTAL: normal sphincter tone, no rectal masses, prostate normal size, smooth, nontender without nodules or masses  MS: no gross musculoskeletal defects noted, no edema  SKIN: no suspicious lesions or rashes  NEURO: Normal strength and tone, mentation intact and speech normal  PSYCH: mentation appears normal, affect normal/bright  LYMPH: no cervical, supraclavicular, axillary, or inguinal adenopathy    ASSESSMENT/PLAN:     Werner was seen today for physical.    Diagnoses and all orders for this visit:    Routine general medical examination at a health care facility  -     Comprehensive metabolic panel (BMP + Alb, Alk Phos, ALT, AST, Total. Bili, TP)  -     Lipid Profile (Chol, Trig, HDL, LDL calc)  -     PSA, screen  -     UA reflex to Microscopic    Tobacco abuse  -     RADIOLOGY REFERRAL    Erectile dysfunction, unspecified erectile dysfunction type    Obesity (BMI 30.0-34.9)    Obstructive sleep apnea  -     CBC with platelets and " "differential                                             D/w pt screening chest CT.                                       He plans 15 lbs further wt loss.   Patient Instructions   I will let you know your lab results.              Please resume taking aspirin 81 mg per day.   Please call and set up the CT scan of your chest.         COUNSELING:  Reviewed preventive health counseling, as reflected in patient instructions         reports that he quit smoking about 2 years ago. His smoking use included Cigarettes. He has a 15.00 pack-year smoking history. He has never used smokeless tobacco.    Estimated body mass index is 34.15 kg/(m^2) as calculated from the following:    Height as of this encounter: 5' 10\" (1.778 m).    Weight as of this encounter: 238 lb (108 kg).   Weight management plan: Discussed healthy diet and exercise guidelines and patient will follow up in 12 months in clinic to re-evaluate.    Counseling Resources:  ATP IV Guidelines  Pooled Cohorts Equation Calculator  FRAX Risk Assessment  ICSI Preventive Guidelines  Dietary Guidelines for Americans, 2010  Recommendi's MyPlate  ASA Prophylaxis  Lung CA Screening    Guzman Zavala MD  [facility]         Results for orders placed or performed in visit on 06/27/17   Comprehensive metabolic panel (BMP + Alb, Alk Phos, ALT, AST, Total. Bili, TP)   Result Value Ref Range    Sodium 141 133 - 144 mmol/L    Potassium 4.1 3.4 - 5.3 mmol/L    Chloride 107 94 - 109 mmol/L    Carbon Dioxide 26 20 - 32 mmol/L    Anion Gap 8 3 - 14 mmol/L    Glucose 95 70 - 99 mg/dL    Urea Nitrogen 15 7 - 30 mg/dL    Creatinine 0.96 0.66 - 1.25 mg/dL    GFR Estimate 80 >60 mL/min/1.7m2    GFR Estimate If Black >90   GFR Calc   >60 mL/min/1.7m2    Calcium 9.3 8.5 - 10.1 mg/dL    Bilirubin Total 0.7 0.2 - 1.3 mg/dL    Albumin 4.1 3.4 - 5.0 g/dL    Protein Total 7.8 6.8 - 8.8 g/dL    Alkaline Phosphatase 68 40 - 150 U/L    ALT 52 0 - 70 U/L    " AST 23 0 - 45 U/L   Lipid Profile (Chol, Trig, HDL, LDL calc)   Result Value Ref Range    Cholesterol 183 <200 mg/dL    Triglycerides 97 <150 mg/dL    HDL Cholesterol 51 >39 mg/dL    LDL Cholesterol Calculated 113 (H) <100 mg/dL    Non HDL Cholesterol 132 (H) <130 mg/dL   PSA, screen   Result Value Ref Range    PSA 2.86 0 - 4 ug/L   UA reflex to Microscopic   Result Value Ref Range    Color Urine Yellow     Appearance Urine Clear     Glucose Urine Negative NEG mg/dL    Bilirubin Urine Negative NEG    Ketones Urine Negative NEG mg/dL    Specific Gravity Urine 1.020 1.003 - 1.035    Blood Urine Negative NEG    pH Urine 5.5 5.0 - 7.0 pH    Protein Albumin Urine Negative NEG mg/dL    Urobilinogen Urine 0.2 0.2 - 1.0 EU/dL    Nitrite Urine Negative NEG    Leukocyte Esterase Urine Negative NEG    Source Midstream Urine    CBC with platelets and differential   Result Value Ref Range    WBC 6.1 4.0 - 11.0 10e9/L    RBC Count 4.93 4.4 - 5.9 10e12/L    Hemoglobin 15.5 13.3 - 17.7 g/dL    Hematocrit 46.1 40.0 - 53.0 %    MCV 94 78 - 100 fl    MCH 31.4 26.5 - 33.0 pg    MCHC 33.6 31.5 - 36.5 g/dL    RDW 13.7 10.0 - 15.0 %    Platelet Count 221 150 - 450 10e9/L    Diff Method Automated Method     % Neutrophils 52.3 %    % Lymphocytes 35.1 %    % Monocytes 10.3 %    % Eosinophils 1.8 %    % Basophils 0.5 %    Absolute Neutrophil 3.2 1.6 - 8.3 10e9/L    Absolute Lymphocytes 2.2 0.8 - 5.3 10e9/L    Absolute Monocytes 0.6 0.0 - 1.3 10e9/L    Absolute Eosinophils 0.1 0.0 - 0.7 10e9/L    Absolute Basophils 0.0 0.0 - 0.2 10e9/L     My chart message sent.                    Most of your lab results are good,including the kidney,liver,bone marrow,urine,glucose,and cholesterol levels.               The PSA is a fraction higher; we should be sure to check this every year.

## 2017-07-13 ENCOUNTER — TRANSFERRED RECORDS (OUTPATIENT)
Dept: HEALTH INFORMATION MANAGEMENT | Facility: CLINIC | Age: 62
End: 2017-07-13

## 2017-08-03 ENCOUNTER — DOCUMENTATION ONLY (OUTPATIENT)
Dept: SLEEP MEDICINE | Facility: CLINIC | Age: 62
End: 2017-08-03

## 2017-08-03 NOTE — PROGRESS NOTES
6 month RUST    Data only recheck    Device settings:    CPAP 13 cm H20    Objective measures: 14 day rolling measures      Compliance   (Goal >70%)  --% compliance greater than four hours rolling average 14 days: 100 %      Leak   (Goal < 24 lpm) --95% OF Leak in litres Rolling Average 14 Days: 36.72 lpm      AHI  (Goal < 5)  --AHI Rolling Average 14 Day: 1.36      Usage  (Goal >240)  --Time mask on face 14 day average: 517 min      Assessment:Pt not meeting objective benchmarks for  leak however this is only slightly elevated and not effecting therapy.   Action plan: pt to follow up per provider request (1-2 yrs)

## 2018-05-08 DIAGNOSIS — G47.33 OBSTRUCTIVE SLEEP APNEA: Primary | ICD-10-CM

## 2018-05-10 NOTE — PROGRESS NOTES
Orders sent to [facility].    Tali Moreno [facility] ~[place]

## 2018-07-05 ENCOUNTER — TELEPHONE (OUTPATIENT)
Dept: FAMILY MEDICINE | Facility: CLINIC | Age: 63
End: 2018-07-05

## 2018-07-05 NOTE — TELEPHONE ENCOUNTER
Reason for Call: Request for an order or referral:    Order or referral being requested: CT Lung Cancer Screening    Date needed: as soon as possible    Has the patient been seen by the PCP for this problem? YES    Additional comments: Patient received letter from [facility] stating it was time for his annual CT Lung Cancer Screening. Patient states he needs to have an order from his primary doctor sent over to [facility]. Phone number: [phone]. Please call patient to confirm order has been sent.     Phone number Patient can be reached at:  Cell number on file:    Telephone Information:   Mobile [phone]       Best Time:  any    Can we leave a detailed message on this number?  YES    Call taken on 7/5/2018 at 9:23 AM by Penelope Ortega

## 2018-07-05 NOTE — TELEPHONE ENCOUNTER
Left voice message asking pt to schedule OV. Patient needs to be seen because it has been more than one year since last visit.  Asked pt to call triage if further questions.

## 2018-07-07 PROBLEM — R91.8 PULMONARY NODULES: Status: ACTIVE | Noted: 2018-07-07

## 2018-07-09 ENCOUNTER — OFFICE VISIT (OUTPATIENT)
Dept: FAMILY MEDICINE | Facility: CLINIC | Age: 63
End: 2018-07-09
Payer: COMMERCIAL

## 2018-07-09 VITALS
HEIGHT: 70 IN | BODY MASS INDEX: 35.36 KG/M2 | RESPIRATION RATE: 20 BRPM | SYSTOLIC BLOOD PRESSURE: 116 MMHG | OXYGEN SATURATION: 96 % | TEMPERATURE: 97.9 F | DIASTOLIC BLOOD PRESSURE: 70 MMHG | HEART RATE: 82 BPM | WEIGHT: 247 LBS

## 2018-07-09 DIAGNOSIS — R91.8 PULMONARY NODULES: ICD-10-CM

## 2018-07-09 DIAGNOSIS — L57.0 AK (ACTINIC KERATOSIS): ICD-10-CM

## 2018-07-09 DIAGNOSIS — Z00.00 ROUTINE GENERAL MEDICAL EXAMINATION AT A HEALTH CARE FACILITY: Primary | ICD-10-CM

## 2018-07-09 DIAGNOSIS — E66.811 OBESITY (BMI 30.0-34.9): ICD-10-CM

## 2018-07-09 PROCEDURE — 99396 PREV VISIT EST AGE 40-64: CPT | Performed by: INTERNAL MEDICINE

## 2018-07-09 NOTE — PATIENT INSTRUCTIONS
Call for a lung scan appointment.                    I will let you know your lab results.   Consider getting the shingles vaccine (Shingrix) at your pharmacy.   Work on losing at least 10 lbs.

## 2018-07-09 NOTE — PROGRESS NOTES
SUBJECTIVE:   CC: Werner Alvarez is an 62 year old male who presents for preventative health visit.     Physical   Annual:     Getting at least 3 servings of Calcium per day:  Yes    Bi-annual eye exam:  Yes    Dental care twice a year:  Yes    Sleep apnea or symptoms of sleep apnea:  Sleep apnea    Diet:  Regular (no restrictions)    Frequency of exercise:  2-3 days/week    Duration of exercise:  15-30 minutes    Taking medications regularly:  Yes    Medication side effects:  Not applicable    Additional concerns today:  No               This patient reports that he feels healthy.        He received a notice from [facility], telling him that it is time for his yearly chest CT.                                                              We reviewed his medications.  He is taking aspirin 81 mg per day.  He is not using any Viagra.  He reports sexual function has improved.    Today's PHQ-2 Score:   PHQ-2 ( 1999 Pfizer) 7/9/2018   Q1: Little interest or pleasure in doing things 0   Q2: Feeling down, depressed or hopeless 0   PHQ-2 Score 0   Q1: Little interest or pleasure in doing things Not at all   Q2: Feeling down, depressed or hopeless Not at all   PHQ-2 Score 0       Abuse: Current or Past(Physical, Sexual or Emotional)- No  Do you feel safe in your environment - Yes      Alcohol Use 7/9/2018   If you drink alcohol do you typically have greater than 3 drinks per day OR greater than 7 drinks per week? No       Last PSA:   PSA   Date Value Ref Range Status   06/27/2017 2.86 0 - 4 ug/L Final     Comment:     Assay Method:  Chemiluminescence using Siemens Vista analyzer       Reviewed orders with patient. Reviewed health maintenance and updated orders accordingly - Yes  Patient Active Problem List    Diagnosis Date Noted     Pulmonary nodules 07/07/2018     Priority: High      2 tiny nodules in 7/17; recheck yearly       Tobacco abuse 05/04/2016     Priority: High     Off and on for many years; quit 2014.       approx 20 pack yrs       Erectile dysfunction, unspecified erectile dysfunction type 09/30/2016     Priority: Medium     Obesity (BMI 30.0-34.9) 07/29/2015     Priority: Medium     Obstructive sleep apnea 07/29/2015     Priority: Medium     Problem list name updated by automated process. Provider to review       Preventive measure 05/04/2016     Priority: Low     Hx of yearly CPE; not yearly PSA; 2.66 in 5/16;  2.86 in 6/17                    Colonoscopy neg in 2015; recheck in 10 yrs.          Past Surgical History:   Procedure Laterality Date     ARTHROSCOPY KNEE RT/LT Bilateral      Social History     Social History     Marital status:      Spouse name: N/A     Number of children: 3     Years of education: N/A     Occupational History     Not on file.     Social History Main Topics     Smoking status: Former Smoker     Packs/day: 1.00     Years: 25.00     Types: Cigarettes     Quit date: 7/29/2014     Smokeless tobacco: Never Used     Alcohol use 0.0 oz/week     0 Standard drinks or equivalent per week      Comment: Socially     Drug use: No     Sexual activity: Yes     Other Topics Concern     Not on file     Social History Narrative    6 grandchildren     Immunization History   Administered Date(s) Administered     Tdap (Adacel,Boostrix) 01/01/2011       BP Readings from Last 3 Encounters:   07/09/18 116/70   06/27/17 124/66   03/02/17 130/69    Wt Readings from Last 3 Encounters:   07/09/18 247 lb (112 kg)   06/27/17 238 lb (108 kg)   03/02/17 261 lb (118.4 kg)                  Current Outpatient Prescriptions   Medication Sig Dispense Refill     aspirin 81 MG tablet Take 1 tablet (81 mg) by mouth daily 30 tablet      order for DME [id number] CPAP  13 CM H2O  WISP NASAL MASK (LARGE)       sildenafil (VIAGRA) 100 MG tablet Take 0.5-1 tablets ( mg) by mouth daily as needed for erectile dysfunction Take 30 min to 4 hours before intercourse. 6 tablet 11     Allergies   Allergen Reactions      "Penicillins Unknown     Since childhood       Reviewed and updated as needed this visit by clinical staff  Tobacco  Allergies  Meds  Med Hx  Surg Hx  Fam Hx  Soc Hx        Reviewed and updated as needed this visit by Provider            Review of Systems  CONSTITUTIONAL: NEGATIVE for fever, chills, change in weight  INTEGUMENTARY/SKIN: A small area of erythema anterior to the right ear, which was treated in the past with liquid nitrogen but it did not resolve.  EYES: NEGATIVE for vision changes or irritation  ENT: NEGATIVE for ear, mouth and throat problems  RESP: NEGATIVE for significant cough or SOB  CV: NEGATIVE for chest pain, palpitations or peripheral edema  GI: NEGATIVE for nausea, abdominal pain, heartburn, or change in bowel habits   male: negative for dysuria, hematuria, decreased urinary stream, erectile dysfunction, urethral discharge  MUSCULOSKELETAL: NEGATIVE for significant arthralgias or myalgia  NEURO: NEGATIVE for weakness, dizziness or paresthesias  PSYCHIATRIC: NEGATIVE for changes in mood or affect    OBJECTIVE:   /70 (BP Location: Left arm, Patient Position: Chair, Cuff Size: Adult Large)  Pulse 82  Temp 97.9  F (36.6  C)  Resp 20  Ht 5' 10\" (1.778 m)  Wt 247 lb (112 kg)  SpO2 96%  BMI 35.44 kg/m2    Physical Exam  GENERAL: alert, no distress and over weight  EYES: Eyes grossly normal to inspection  HENT: ear canals and TM's normal, nose and mouth without ulcers or lesions  NECK: no adenopathy, no asymmetry, masses, or scars and thyroid normal to palpation  RESP: lungs clear to auscultation - no rales, rhonchi or wheezes  CV: regular rate and rhythm, normal S1 S2, no S3 or S4, no murmur, click or rub, no peripheral edema and peripheral pulses strong  ABDOMEN: soft, nontender, without hepatosplenomegaly or masses   (male): normal male genitalia without lesions or urethral discharge, no hernia  RECTAL: normal sphincter tone, no rectal masses, prostate normal size, smooth, " "nontender without nodules or masses  MS: no gross musculoskeletal defects noted, no edema  SKIN: Small area of erythema anterior to the right ear  NEURO: Normal strength and tone, mentation intact and speech normal  PSYCH: mentation appears normal, affect normal/bright    none     ASSESSMENT/PLAN:   Werner was seen today for physical.    Diagnoses and all orders for this visit:    Routine general medical examination at a health care facility  -     Comprehensive metabolic panel (BMP + Alb, Alk Phos, ALT, AST, Total. Bili, TP); Future  -     Lipid Profile (Chol, Trig, HDL, LDL calc); Future  -     PSA, screen; Future    Pulmonary nodules  -     RADIOLOGY REFERRAL    Obesity (BMI 30.0-34.9)    AK (actinic keratosis)  -     DERMATOLOGY REFERRAL            Summary and implications:  Except for being overweight, overall his health seems to be fairly good.                          He will return for fasting labs in the near future.  Will schedule a chest CT.                  See dermatology                Patient Instructions   Call for a lung scan appointment.                    I will let you know your lab results.   Consider getting the shingles vaccine (Shingrix) at your pharmacy.   Work on losing at least 10 lbs.                .  COUNSELING:   Reviewed preventive health counseling, as reflected in patient instructions       Regular exercise       Healthy diet/nutrition    BP Readings from Last 1 Encounters:   06/27/17 124/66     Estimated body mass index is 34.15 kg/(m^2) as calculated from the following:    Height as of 6/27/17: 5' 10\" (1.778 m).    Weight as of 6/27/17: 238 lb (108 kg).      Weight management plan: Discussed healthy diet and exercise guidelines and patient will follow up in 12 months in clinic to re-evaluate.     reports that he quit smoking about 3 years ago. His smoking use included Cigarettes. He has a 25.00 pack-year smoking history. He has never used smokeless tobacco.      Counseling " Resources:  ATP IV Guidelines  Pooled Cohorts Equation Calculator  FRAX Risk Assessment  ICSI Preventive Guidelines  Dietary Guidelines for Americans, 2010  USDA's MyPlate  ASA Prophylaxis  Lung CA Screening    Guzman Zavala MD  [facility]  Answers for HPI/ROS submitted by the patient on 7/9/2018   PHQ-2 Score: 0                     Recent Results (from the past 48 hour(s))   Comprehensive metabolic panel (BMP + Alb, Alk Phos, ALT, AST, Total. Bili, TP)    Collection Time: 07/10/18  8:14 AM   Result Value Ref Range    Sodium 139 133 - 144 mmol/L    Potassium 4.2 3.4 - 5.3 mmol/L    Chloride 107 94 - 109 mmol/L    Carbon Dioxide 24 20 - 32 mmol/L    Anion Gap 8 3 - 14 mmol/L    Glucose 94 70 - 99 mg/dL    Urea Nitrogen 18 7 - 30 mg/dL    Creatinine 0.89 0.66 - 1.25 mg/dL    GFR Estimate 86 >60 mL/min/1.7m2    GFR Estimate If Black >90 >60 mL/min/1.7m2    Calcium 8.7 8.5 - 10.1 mg/dL    Bilirubin Total 0.5 0.2 - 1.3 mg/dL    Albumin 3.9 3.4 - 5.0 g/dL    Protein Total 7.7 6.8 - 8.8 g/dL    Alkaline Phosphatase 47 40 - 150 U/L    ALT 59 0 - 70 U/L    AST 37 0 - 45 U/L   Lipid Profile (Chol, Trig, HDL, LDL calc)    Collection Time: 07/10/18  8:14 AM   Result Value Ref Range    Cholesterol 157 <200 mg/dL    Triglycerides 72 <150 mg/dL    HDL Cholesterol 47 >39 mg/dL    LDL Cholesterol Calculated 96 <100 mg/dL    Non HDL Cholesterol 110 <130 mg/dL   PSA, screen    Collection Time: 07/10/18  8:14 AM   Result Value Ref Range    PSA 2.22 0 - 4 ug/L     My chart message sent.              Your lab results are normal,including the liver,kidney,glucose,cholesterol,and the PSA level.      I have generated a dermatology referral.  You should be able to see the pertinent information (phone number to call etc) in My Chart.

## 2018-07-09 NOTE — MR AVS SNAPSHOT
After Visit Summary   7/9/2018    Werner Alvarez    MRN: [id number]           Patient Information     Date Of Birth          1955        Visit Information        Provider Department      7/9/2018 3:30 PM Guzman Zavala MD [facility]        Today's Diagnoses     Routine general medical examination at a health care facility    -  1    Pulmonary nodules        Obesity (BMI 30.0-34.9)          Care Instructions    Call for a lung scan appointment.                    I will let you know your lab results.   Consider getting the shingles vaccine (Shingrix) at your pharmacy.   Work on losing at least 10 lbs.                      Follow-ups after your visit        Additional Services     RADIOLOGY REFERRAL       Your provider has referred you to: [facility]: [facility] [phone]   [email or web address]   PLEASE DO A CHEST CT NO CONTRAST.              FOLLOW UP LUNG NODULES.     COMPARE TO 7/13/17.  Please be aware that coverage of these services is subject to the terms and limitations of your health insurance plan.  Call member services at your health plan with any benefit or coverage questions.      Please bring the following to your appointment:    >>   Any x-rays, CTs or MRIs which have been performed.  Contact the facility where they were done to arrange for  prior to your scheduled appointment.    >>   List of current medications   >>   This referral request   >>   Any documents/labs given to you for this referral    Prior authorization is required for MRI/MRA, CT, Dexa Scans and Worker's Compensation cases.                  Future tests that were ordered for you today     Open Future Orders        Priority Expected Expires Ordered    Comprehensive metabolic panel (BMP + Alb, Alk Phos, ALT, AST, Total. Bili, TP) Routine 7/10/2018 10/9/2018 7/9/2018    Lipid Profile (Chol, Trig, HDL, LDL calc) Routine 7/10/2018 10/9/2018 7/9/2018    PSA, screen  "Routine 7/10/2018 10/9/2018 7/9/2018            Who to contact     If you have questions or need follow up information about today's clinic visit or your schedule please contact [facility] directly at [phone].  Normal or non-critical lab and imaging results will be communicated to you by MyChart, letter or phone within 4 business days after the clinic has received the results. If you do not hear from us within 7 days, please contact the clinic through MyChart or phone. If you have a critical or abnormal lab result, we will notify you by phone as soon as possible.  Submit refill requests through [employer] or call your pharmacy and they will forward the refill request to us. Please allow 3 business days for your refill to be completed.          Additional Information About Your Visit        ClickMedixhart Information     [employer] gives you secure access to your electronic health record. If you see a primary care provider, you can also send messages to your care team and make appointments. If you have questions, please call your primary care clinic.  If you do not have a primary care provider, please call [phone] and they will assist you.        Care EveryWhere ID     This is your Care EveryWhere ID. This could be used by other organizations to access your [facility] medical records  [id number]        Your Vitals Were     Pulse Temperature Respirations Height Pulse Oximetry BMI (Body Mass Index)    82 97.9  F (36.6  C) 20 5' 10\" (1.778 m) 96% 35.44 kg/m2       Blood Pressure from Last 3 Encounters:   07/09/18 116/70   06/27/17 124/66   03/02/17 130/69    Weight from Last 3 Encounters:   07/09/18 247 lb (112 kg)   06/27/17 238 lb (108 kg)   03/02/17 261 lb (118.4 kg)              We Performed the Following     RADIOLOGY REFERRAL        Primary Care Provider Office Phone # Fax #    Guzman Zavala [phone] [address]  [facility] [id number]      "   Equal Access to Services     [facility] : Hadii aad ku hadnickiepeña Jimmyali, wadennisda luqadaha, qaybta kaalmaagustina thomas, carolina sanchez. So [facility] [phone].    ATENCIÓN: Si habla español, tiene a quiroz disposición servicios gratuitos de asistencia lingüística. Llame al [phone].    We comply with applicable federal civil rights laws and [place] laws. We do not discriminate on the basis of race, color, national origin, age, disability, sex, sexual orientation, or gender identity.            Thank you!     Thank you for choosing [facility]  for your care. Our goal is always to provide you with excellent care. Hearing back from our patients is one way we can continue to improve our services. Please take a few minutes to complete the written survey that you may receive in the mail after your visit with us. Thank you!             Your Updated Medication List - Protect others around you: Learn how to safely use, store and throw away your medicines at www.disposemymeds.org.          This list is accurate as of 7/9/18  4:00 PM.  Always use your most recent med list.                   Brand Name Dispense Instructions for use Diagnosis    aspirin 81 MG tablet     30 tablet    Take 1 tablet (81 mg) by mouth daily        order for DME      [id number] CPAP 13 CM H2O WISP NASAL MASK (LARGE)        sildenafil 100 MG tablet    VIAGRA    6 tablet    Take 0.5-1 tablets ( mg) by mouth daily as needed for erectile dysfunction Take 30 min to 4 hours before intercourse.    Erectile dysfunction, unspecified erectile dysfunction type

## 2018-07-10 DIAGNOSIS — Z00.00 ROUTINE GENERAL MEDICAL EXAMINATION AT A HEALTH CARE FACILITY: ICD-10-CM

## 2018-07-10 LAB
ALBUMIN SERPL-MCNC: 3.9 G/DL (ref 3.4–5)
ALP SERPL-CCNC: 47 U/L (ref 40–150)
ALT SERPL W P-5'-P-CCNC: 59 U/L (ref 0–70)
ANION GAP SERPL CALCULATED.3IONS-SCNC: 8 MMOL/L (ref 3–14)
AST SERPL W P-5'-P-CCNC: 37 U/L (ref 0–45)
BILIRUB SERPL-MCNC: 0.5 MG/DL (ref 0.2–1.3)
BUN SERPL-MCNC: 18 MG/DL (ref 7–30)
CALCIUM SERPL-MCNC: 8.7 MG/DL (ref 8.5–10.1)
CHLORIDE SERPL-SCNC: 107 MMOL/L (ref 94–109)
CHOLEST SERPL-MCNC: 157 MG/DL
CO2 SERPL-SCNC: 24 MMOL/L (ref 20–32)
CREAT SERPL-MCNC: 0.89 MG/DL (ref 0.66–1.25)
GFR SERPL CREATININE-BSD FRML MDRD: 86 ML/MIN/1.7M2
GLUCOSE SERPL-MCNC: 94 MG/DL (ref 70–99)
HDLC SERPL-MCNC: 47 MG/DL
LDLC SERPL CALC-MCNC: 96 MG/DL
NONHDLC SERPL-MCNC: 110 MG/DL
POTASSIUM SERPL-SCNC: 4.2 MMOL/L (ref 3.4–5.3)
PROT SERPL-MCNC: 7.7 G/DL (ref 6.8–8.8)
PSA SERPL-ACNC: 2.22 UG/L (ref 0–4)
SODIUM SERPL-SCNC: 139 MMOL/L (ref 133–144)
TRIGL SERPL-MCNC: 72 MG/DL

## 2018-07-10 PROCEDURE — G0103 PSA SCREENING: HCPCS | Performed by: INTERNAL MEDICINE

## 2018-07-10 PROCEDURE — 80053 COMPREHEN METABOLIC PANEL: CPT | Performed by: INTERNAL MEDICINE

## 2018-07-10 PROCEDURE — 36415 COLL VENOUS BLD VENIPUNCTURE: CPT | Performed by: INTERNAL MEDICINE

## 2018-07-10 PROCEDURE — 80061 LIPID PANEL: CPT | Performed by: INTERNAL MEDICINE

## 2018-07-17 ENCOUNTER — TRANSFERRED RECORDS (OUTPATIENT)
Dept: HEALTH INFORMATION MANAGEMENT | Facility: CLINIC | Age: 63
End: 2018-07-17

## 2018-07-18 NOTE — TELEPHONE ENCOUNTER
Called pt and left a message on vm informing pt of CT results. Stable no further CT's per Dr Avalos. See scanned report.

## 2018-08-18 ENCOUNTER — OFFICE VISIT (OUTPATIENT)
Dept: URGENT CARE | Facility: URGENT CARE | Age: 63
End: 2018-08-18
Payer: COMMERCIAL

## 2018-08-18 VITALS
WEIGHT: 250.31 LBS | TEMPERATURE: 99.1 F | HEART RATE: 84 BPM | BODY MASS INDEX: 35.92 KG/M2 | RESPIRATION RATE: 16 BRPM | SYSTOLIC BLOOD PRESSURE: 120 MMHG | DIASTOLIC BLOOD PRESSURE: 70 MMHG

## 2018-08-18 DIAGNOSIS — R21 RASH AND NONSPECIFIC SKIN ERUPTION: Primary | ICD-10-CM

## 2018-08-18 PROCEDURE — 99213 OFFICE O/P EST LOW 20 MIN: CPT | Performed by: PHYSICIAN ASSISTANT

## 2018-08-18 NOTE — PATIENT INSTRUCTIONS
Pityriasis Rosea  This is a harmless non-contagious rash. The exact cause is unknown. It is not an allergic reaction, and does not seem to be caused by a viral or fungal infection. Although anyone can get it, it is most often seen in children and young adults ages 10 to 35.  Pityriasis usually resolves on its own without treatment in 2 weeks.  In some people it may take 6 to 8 weeks to clear up.   Home care    For dry skin, use a moisturizing cream. For itchiness, use 1% hydrocortisone cream (no prescription needed) or calamine lotion 2 to 3 times a day.    Exposure to natural sunlight helps some people. It may help fade the rash, but doesn't seem to help the itching. Don't overdo it in the sun, as your skin may be more sensitive than usual. You don t want to burn yourself. Artificial sun lamps, sun beds, and tanning salons are not recommended.    This condition is not contagious. Your child may attend  or school while the rash is present.  Medicines  Talk with your healthcare provider before using any medicines. All medicines have side effects.    Medicines will not get rid of the rash.     Moisturizing skin creams may help.    Antihistamines may help with itching, but they can make you sleepy.    Topical steroids are sometimes used.  Follow-up care  Follow up with your healthcare provider, or as advised. Call your provider if the itching is not controlled by the above suggestions, or if the rash lasts longer than 3 months.  When to seek medical advice  Call your healthcare provider right away if any of these occur:    You develop a rash and are pregnant    Severe itching    Signs of infection in the skin (increasing redness, drainage of pus, yellow-brown scabs)    Fever or other symptoms of a new illness  Date Last Reviewed: 8/1/2016 2000-2017 [employer]. [address] [place] [address], [place] [address]. All rights reserved. This information is not intended as a substitute for professional  medical care. Always follow your healthcare professional's instructions.

## 2018-08-18 NOTE — PROGRESS NOTES
SUBJECTIVE:  Werner Alvarez is a 63 year old male who presents to the clinic today for a rash.  Onset was 3-4 days ago.    Rash is NOT itchy.    No new topical products or medications.    No new foods.      Rash is otherwise asymptomatic.      Rash is on trunk, greater on left side.  Also some on arms.      No fevers or other symptoms.      No ill contacts.      No past medical history on file.   Denies any significant PMH    Allergies   Allergen Reactions     Penicillins Unknown     Since childhood     Social History   Substance Use Topics     Smoking status: Former Smoker     Packs/day: 1.00     Years: 25.00     Types: Cigarettes     Quit date: 7/29/2014     Smokeless tobacco: Never Used     Alcohol use 0.0 oz/week     0 Standard drinks or equivalent per week      Comment: Socially       ROS:  CONSTITUTIONAL:NEGATIVE for fever, chills, change in weight  INTEGUMENTARY/SKIN: as per HPI  EYES: NEGATIVE for vision changes or irritation  ENT/MOUTH: NEGATIVE for ear, mouth and throat problems  RESP:NEGATIVE for significant cough or SOB  CV: NEGATIVE for chest pain, palpitations or peripheral edema  Review of systems negative except as stated above.    EXAM:   /70 (Cuff Size: Adult Large)  Pulse 84  Temp 99.1  F (37.3  C) (Oral)  Resp 16  Wt 250 lb 5 oz (113.5 kg)  BMI 35.92 kg/m2  GENERAL: alert, no acute distress.  SKIN: annular erythematous lesions with collarette scale on trunk.  Subtle fir tree pattern.  Greater on right trunk than left.  No vesicles or open sores.    OP: clear    (R21) Rash and nonspecific skin eruption  (primary encounter diagnosis)  Comment: consistent with pityriasis rosea  Plan: handout on NH given and reviewed.      Patient expresses understanding and agreement with the assessment and plan as above.

## 2018-08-18 NOTE — MR AVS SNAPSHOT
After Visit Summary   8/18/2018    Werner Alvarez    MRN: [id number]           Patient Information     Date Of Birth          1955        Visit Information        Provider Department      8/18/2018 12:10 PM Hue Cuadra PA-C [facility]        Today's Diagnoses     Rash and nonspecific skin eruption    -  1      Care Instructions      Pityriasis Rosea  This is a harmless non-contagious rash. The exact cause is unknown. It is not an allergic reaction, and does not seem to be caused by a viral or fungal infection. Although anyone can get it, it is most often seen in children and young adults ages 10 to 35.  Pityriasis usually resolves on its own without treatment in 2 weeks.  In some people it may take 6 to 8 weeks to clear up.   Home care    For dry skin, use a moisturizing cream. For itchiness, use 1% hydrocortisone cream (no prescription needed) or calamine lotion 2 to 3 times a day.    Exposure to natural sunlight helps some people. It may help fade the rash, but doesn't seem to help the itching. Don't overdo it in the sun, as your skin may be more sensitive than usual. You don t want to burn yourself. Artificial sun lamps, sun beds, and tanning salons are not recommended.    This condition is not contagious. Your child may attend  or school while the rash is present.  Medicines  Talk with your healthcare provider before using any medicines. All medicines have side effects.    Medicines will not get rid of the rash.     Moisturizing skin creams may help.    Antihistamines may help with itching, but they can make you sleepy.    Topical steroids are sometimes used.  Follow-up care  Follow up with your healthcare provider, or as advised. Call your provider if the itching is not controlled by the above suggestions, or if the rash lasts longer than 3 months.  When to seek medical advice  Call your healthcare provider right away if any of these  occur:    You develop a rash and are pregnant    Severe itching    Signs of infection in the skin (increasing redness, drainage of pus, yellow-brown scabs)    Fever or other symptoms of a new illness  Date Last Reviewed: 8/1/2016 2000-2017 [employer]. [address] [place] [address]. All rights reserved. This information is not intended as a substitute for professional medical care. Always follow your healthcare professional's instructions.                Follow-ups after your visit        Your next 10 appointments already scheduled     Sep 10, 2018 10:20 AM CDT   New Visit with Omaira Harding PA-C   [facility]    [address] [address] [phone]              Who to contact     If you have questions or need follow up information about today's clinic visit or your schedule please contact [facility] [place] directly at [phone].  Normal or non-critical lab and imaging results will be communicated to you by [employer], letter or phone within 4 business days after the clinic has received the results. If you do not hear from us within 7 days, please contact the clinic through [employer] or phone. If you have a critical or abnormal lab result, we will notify you by phone as soon as possible.  Submit refill requests through "[employer] or call your pharmacy and they will forward the refill request to us. Please allow 3 business days for your refill to be completed.          Additional Information About Your Visit        [employer] Information     "[employer] gives you secure access to your electronic health record. If you see a primary care provider, you can also send messages to your care team and make appointments. If you have questions, please call your primary care clinic.  If you do not have a primary care provider, please call [phone] and they will assist you.        Care  EveryWhere ID     This is your Care EveryWhere ID. This could be used by other organizations to access your [facility] medical records  [id number]        Your Vitals Were     Pulse Temperature Respirations BMI (Body Mass Index)          84 99.1  F (37.3  C) (Oral) 16 35.92 kg/m2         Blood Pressure from Last 3 Encounters:   08/18/18 120/70   07/09/18 116/70   06/27/17 124/66    Weight from Last 3 Encounters:   08/18/18 250 lb 5 oz (113.5 kg)   07/09/18 247 lb (112 kg)   06/27/17 238 lb (108 kg)              Today, you had the following     No orders found for display       Primary Care Provider Office Phone # Fax #    Guzman Zavala [phone] [address] [facility] [address]        Equal Access to Services     [facility]: Hadii aad ku hadasho Soomaali, waaxda luqadaha, qaybta kaalmada adeegyada, waxay vivienin haymagyn kassandra loomis . So [facility] [phone].    ATENCIÓN: Si habla español, tiene a quiroz disposición servicios gratuitos de asistencia lingüística. Llame al [phone].    We comply with applicable federal civil rights laws and [place] laws. We do not discriminate on the basis of race, color, national origin, age, disability, sex, sexual orientation, or gender identity.            Thank you!     Thank you for choosing [facility]  for your care. Our goal is always to provide you with excellent care. Hearing back from our patients is one way we can continue to improve our services. Please take a few minutes to complete the written survey that you may receive in the mail after your visit with us. Thank you!             Your Updated Medication List - Protect others around you: Learn how to safely use, store and throw away your medicines at www.disposemymeds.org.          This list is accurate as of 8/18/18  1:10 PM.  Always use your most recent med list.                   Brand Name Dispense Instructions for use Diagnosis    aspirin 81  MG tablet     30 tablet    Take 1 tablet (81 mg) by mouth daily        order for DME      [id number] CPAP 13 CM H2O WISP NASAL MASK (LARGE)        sildenafil 100 MG tablet    VIAGRA    6 tablet    Take 0.5-1 tablets ( mg) by mouth daily as needed for erectile dysfunction Take 30 min to 4 hours before intercourse.    Erectile dysfunction, unspecified erectile dysfunction type

## 2018-09-10 ENCOUNTER — OFFICE VISIT (OUTPATIENT)
Dept: DERMATOLOGY | Facility: CLINIC | Age: 63
End: 2018-09-10
Attending: INTERNAL MEDICINE
Payer: COMMERCIAL

## 2018-09-10 VITALS — OXYGEN SATURATION: 96 % | DIASTOLIC BLOOD PRESSURE: 78 MMHG | HEART RATE: 85 BPM | SYSTOLIC BLOOD PRESSURE: 117 MMHG

## 2018-09-10 DIAGNOSIS — L81.4 LENTIGINES: ICD-10-CM

## 2018-09-10 DIAGNOSIS — L57.0 AK (ACTINIC KERATOSIS): ICD-10-CM

## 2018-09-10 DIAGNOSIS — L42 PITYRIASIS ROSEA: Primary | ICD-10-CM

## 2018-09-10 DIAGNOSIS — L57.8 SOLAR ELASTOSIS: ICD-10-CM

## 2018-09-10 DIAGNOSIS — D18.01 CHERRY ANGIOMA: ICD-10-CM

## 2018-09-10 DIAGNOSIS — D22.9 MULTIPLE BENIGN NEVI: ICD-10-CM

## 2018-09-10 DIAGNOSIS — L29.9 LOCALIZED PRURITUS: ICD-10-CM

## 2018-09-10 PROCEDURE — 17000 DESTRUCT PREMALG LESION: CPT | Performed by: PHYSICIAN ASSISTANT

## 2018-09-10 PROCEDURE — 99214 OFFICE O/P EST MOD 30 MIN: CPT | Mod: 25 | Performed by: PHYSICIAN ASSISTANT

## 2018-09-10 PROCEDURE — 17003 DESTRUCT PREMALG LES 2-14: CPT | Performed by: PHYSICIAN ASSISTANT

## 2018-09-10 RX ORDER — TRIAMCINOLONE ACETONIDE 5 MG/G
CREAM TOPICAL
Qty: 60 G | Refills: 0 | Status: SHIPPED | OUTPATIENT
Start: 2018-09-10 | End: 2019-05-07

## 2018-09-10 RX ORDER — TRIAMCINOLONE ACETONIDE 5 MG/G
CREAM TOPICAL
Qty: 60 G | Refills: 0 | Status: SHIPPED | OUTPATIENT
Start: 2018-09-10 | End: 2018-09-10

## 2018-09-10 NOTE — PATIENT INSTRUCTIONS
Pityriasis rosea  Triamcinolone :Apply a thin layer to affect area BID x 1-2 weeks. As needed. Dont use on face or body folds.       Proper skin care from [facility]:    -Eliminate harsh soaps as they strip the natural oils from the skin, often resulting in dry itchy skin ( i.e. Dial, Zest, [place]ut Spring)  -Use mild soaps such as Cetaphil or Dove Sensitive Skin in the shower. You do not need to use soap on arms, legs, and trunk every time you shower unless visibly soiled.   -Avoid hot or cold showers.  -After showering, lightly dry off and apply moisturizing within 2-3 minutes. This will help trap moisture in the skin.   -Aggressive use of a moisturizer at least 1-2 times a day to the entire body (including -Vanicream, Cetaphil, Aquaphor or Cerave) and moisturize hands after every washing.  -We recommend using moisturizers that come in a tub that needs to be scooped out, not a pump. This has more of an oil base. It will hold moisture in your skin much better than a water base moisturizer. The above recommended are non-pore clogging.           Wear a sunscreen with at least SPF 30 on your face, ears, neck and V of the chest daily. Wear sunscreen on other areas of the body if those areas are exposed to the sun throughout the day. Sunscreens can contain physical and/or chemical blockers. Physical blockers are less likely to clog pores, these include zinc oxide and titanium dioxide. Reapply every two hour and after swimming. Sunscreen examples include Neutrogena, CeraVe, Blue Lizard, Elta MD and many others.    UV radiation  UVA radiation remains constant throughout the day and throughout the year. It is a longer wavelength than UVB and therefore penetrates deeper into the skin leading to immediate and delayed tanning, photoaging, and skin cancer. 70-80% of UVA and UVB radiation occurs between the hours of 10am-2pm.  UVB radiation  UVB radiation causes the most harmful effects and is more significant during the  summer months. However, snow and ice can reflect UVB radiation leading to skin damage during the winter months as well. UVB radiation is responsible for tanning, burning, inflammation, delayed erythema (pinkness), pigmentation (brown spots), and skin cancer.   Just because you do not burn or are not developing a tan does not mean that you are not damaging your skin. A 15 minute drive to and from work for 30 years an lead to chronic sun damage of the skin. It is important to wear a broad spectrum (both UVA and UVB) sunscreen EVERY day with at least 30 SPF. Apply to face, ears, neck and v of the chest as this is where most of our sun exposure is. Reapply sunscreen every two hours if you plan on being outside.   Erasmo Dhaliwal. Clinical Dermatology: A Color Guide to Diagnosis and Therapy. Elsevier, 2016.     WOUND CARE INSTRUCTIONS  FOR CRYOSURGERY        This area treated with liquid nitrogen will form a blister. You do not need to bandage the area until after the blister forms and breaks (which may be a few days).  When the blister breaks, begin daily dressing changes as follows:    1) Clean and dry the area with tap water using clean Q-tip or sterile gauze pad.    2) Apply Polysporin ointment or Bacitracin ointment over entire wound.  Do NOT use Neosporin ointment.    3) Cover the wound with a band-aid or sterile non-stick gauze pad and micropore paper tape.      REPEAT THESE INSTRUCTIONS AT LEAST ONCE A DAY UNTIL THE WOUND HAS COMPLETELY HEALED.        It is an old wives tale that a wound heals better when it is exposed to air and allowed to dry out. The wound will heal faster with a better cosmetic result if it is kept moist with ointment and covered with a bandage.  Do not let the wound dry out.      IMPORTANT INFORMATION ON REVERSE SIDE    Supplies Needed:     *Cotton tipped applicators (Q-tips)   *Polysporin ointment or Bacitracin ointment (NOT NEOSPORIN)   *Band-aids, or non stick gauze pads and micropore  paper tape                PATIENT INFORMATION    During the healing process you will notice a number of changes. All wounds develop a small halo of redness surrounding the wound.  This means healing is occurring. Severe itching with extensive redness usually indicates sensitivity to the ointment or bandage tape used to dress the wound.  You should call our office if this develops.      Swelling and/or discoloration around your surgical site is common, particularly when performed around the eye.    All wounds normally drain.  The larger the wound the more drainage there will be.  After 7-10 days, you will notice the wound beginning to shrink and new skin will begin to grow.  The wound is healed when you can see skin has formed over the entire area.  A healed wound has a healthy, shiny look to the surface and is red to dark pink in color to normalize.  Wounds may take approximately 4-6 weeks to heal.  Larger wounds may take 6-8 weeks.  After the wound is healed you may discontinue dressing changes.    You may experience a sensation of tightness as your wound heals. This is normal and will gradually subside.    Your healed wound may be sensitive to temperature changes. This sensitivity improves with time, but if you re having a lot of discomfort, try to avoid temperature extremes.    Patients frequently experience itching after their wound appears to have healed because of the continue healing under the skin.  Plain Vaseline will help relieve the itching.

## 2018-09-10 NOTE — LETTER
9/10/2018         RE: Werner Alvarez  [address] [facility] [address]        Dear Colleague,    Thank you for referring your patient, Werner Alvarez, to the [facility]. Please see a copy of my visit note below.    HPI:  Werner Alvarez is a 63 year old male patient here today for rash on flanks .  Patient states this has been present for a few weeks. Was seen in UC and was told it was pityriasis rosea.  Patient reports the following symptoms: itch with hot water prn.  Patient reports the following previous treatments: none.  Patient reports the following modifying factors none.  Associated symptoms: none. Pt would like a full body exam today. Was seen by his PCP and had an AK treated on right temple with cryo. Still present.   Patient has no other skin complaints today.  Remainder of the HPI, Meds, PMH, Allergies, FH, and SH was reviewed in chart.    Pertinent Hx:   Ak, No personal or family history of skin cancer    History reviewed. No pertinent past medical history.    Past Surgical History:   Procedure Laterality Date     ARTHROSCOPY KNEE RT/LT Bilateral         Family History   Problem Relation Age of Onset     Emphysema Mother      still alive 2016     Other Cancer Father 78     throat cancer       Social History     Social History     Marital status:      Spouse name: N/A     Number of children: 3     Years of education: N/A     Occupational History     Not on file.     Social History Main Topics     Smoking status: Former Smoker     Packs/day: 1.00     Years: 25.00     Types: Cigarettes     Quit date: 7/29/2014     Smokeless tobacco: Never Used     Alcohol use 0.0 oz/week     0 Standard drinks or equivalent per week      Comment: Socially     Drug use: No     Sexual activity: Yes     Other Topics Concern     Not on file     Social History Narrative    6 grandchildren       Outpatient Encounter Prescriptions as of 9/10/2018   Medication Sig Dispense Refill      aspirin 81 MG tablet Take 1 tablet (81 mg) by mouth daily 30 tablet      order for DME [id number] CPAP  13 CM H2O  WISP NASAL MASK (LARGE)       sildenafil (VIAGRA) 100 MG tablet Take 0.5-1 tablets ( mg) by mouth daily as needed for erectile dysfunction Take 30 min to 4 hours before intercourse. 6 tablet 11     No facility-administered encounter medications on file as of 9/10/2018.        Review Of Systems:  Skin: As above  Eyes: negative  Ears/Nose/Throat: negative  Respiratory: No shortness of breath, dyspnea on exertion, cough, or hemoptysis  Cardiovascular: negative  Gastrointestinal: negative  Genitourinary: negative  Musculoskeletal: negative  Neurologic: negative  Psychiatric: negative  Hematologic/Lymphatic/Immunologic: negative  Endocrine: negative      Objective:     /78  Pulse 85  SpO2 96%  Eyes: Conjunctivae/lids: Normal   ENT: Lips:  Normal  MSK: Normal  Cardiovascular: Peripheral edema none  Pulm: Breathing Normal  Neuro/Psych: Orientation: Normal; Mood/Affect: Normal, NAD, WDWN  Pt accompanied by: self  Following areas examined:   Scalp, face, eyelids, lips, neck, chest, abdomen, back, buttocks, and R&L upper and lower extremities.  Mae skin type:i   Findings:  Red smooth well-defined macules on trunk and extremities.  Tan WD smooth macules on face, neck, trunk, and extremities.  Rhytides, hypo/hyperpigmentation, and atrophy  Well circumscribed macules with symmetric color distribution on trunk and extremities.2-3mm.  Pink slightly scaly oval patches on flanks and thighs  Pink scaly macule on left temple and nasal tip  Assessment and Plan:  1) cherry angiomas, lentigines, multiple benign nevi  Treatment of these lesions would be purely cosmetic and not medically neccessary.  Lesion may recur and/or may not completely resolve. May need additional treatment.  Different removal options including excision, cryotherapy, cautery and /or laser.      2) favor pityriasis rosea with  pruritis  Discussed etiology and course.  Can take 4-6 weeks for resolution. Sometimes up to 20 weeks.  Triamcinolone :Apply a thin layer to affect area BID x 1-2 weeks. As needed. Dont use on face or body folds.  Discussed side effects of topical steroids including but not limited to atrophy (skin thinning), striae (stretch marks) telangiectasias, steroid acne, and others. Do not apply to normal skin. Do not apply to discolored skin that does not have rash present. Educated patient on post inflammatory hyperpigmentation.     3) ak x 2 and solar elastosis  LN2 for 5 seconds x 2. Discussed AE include hypopigmentation (white spot) and recurrence. Follow up in 2-3 months to recheck lesions. There is a 0.025%-20% chance that AKs can develop into a SCC.   Discussed treating with LN2 vs PDT vs Efudex. Pt elected LN2    Signs and Symptoms of non-melanoma skin cancer and ABCDEs of melanoma reviewed with patient. Patient encouraged to perform monthly self skin exams and educated on how to perform them. UV precautions reviewed with patient. Patient was asked about new or changing moles/lesions on body.     Wear a sunscreen with at least SPF 30 on your face, ears, neck and V of the chest daily. Wear sunscreen on other areas of the body if those areas are exposed to the sun throughout the day. Sunscreens can contain physical and/or chemical blockers. Physical blockers are less likely to clog pores, these include zinc oxide and titanium dioxide. Reapply every two hour and after swimming. Sunscreen examples include Neutrogena, CeraVe, Blue Lizard, Elta MD and many others.        Follow up in 2-3 months to recheck aks. Yearly for FBE.      Again, thank you for allowing me to participate in the care of your patient.        Sincerely,        Omaira Harding PA-C

## 2018-09-10 NOTE — PROGRESS NOTES
HPI:  Werner Alvarez is a 63 year old male patient here today for rash on flanks .  Patient states this has been present for a few weeks. Was seen in UC and was told it was pityriasis rosea.  Patient reports the following symptoms: itch with hot water prn.  Patient reports the following previous treatments: none.  Patient reports the following modifying factors none.  Associated symptoms: none. Pt would like a full body exam today. Was seen by his PCP and had an AK treated on right temple with cryo. Still present.   Patient has no other skin complaints today.  Remainder of the HPI, Meds, PMH, Allergies, FH, and SH was reviewed in chart.    Pertinent Hx:   Ak, No personal or family history of skin cancer    History reviewed. No pertinent past medical history.    Past Surgical History:   Procedure Laterality Date     ARTHROSCOPY KNEE RT/LT Bilateral         Family History   Problem Relation Age of Onset     Emphysema Mother      still alive 2016     Other Cancer Father 78     throat cancer       Social History     Social History     Marital status:      Spouse name: N/A     Number of children: 3     Years of education: N/A     Occupational History     Not on file.     Social History Main Topics     Smoking status: Former Smoker     Packs/day: 1.00     Years: 25.00     Types: Cigarettes     Quit date: 7/29/2014     Smokeless tobacco: Never Used     Alcohol use 0.0 oz/week     0 Standard drinks or equivalent per week      Comment: Socially     Drug use: No     Sexual activity: Yes     Other Topics Concern     Not on file     Social History Narrative    6 grandchildren       Outpatient Encounter Prescriptions as of 9/10/2018   Medication Sig Dispense Refill     aspirin 81 MG tablet Take 1 tablet (81 mg) by mouth daily 30 tablet      order for DME [id number] CPAP  13 CM H2O  WISP NASAL MASK (LARGE)       sildenafil (VIAGRA) 100 MG tablet Take 0.5-1 tablets ( mg) by mouth daily as needed for erectile  dysfunction Take 30 min to 4 hours before intercourse. 6 tablet 11     No facility-administered encounter medications on file as of 9/10/2018.        Review Of Systems:  Skin: As above  Eyes: negative  Ears/Nose/Throat: negative  Respiratory: No shortness of breath, dyspnea on exertion, cough, or hemoptysis  Cardiovascular: negative  Gastrointestinal: negative  Genitourinary: negative  Musculoskeletal: negative  Neurologic: negative  Psychiatric: negative  Hematologic/Lymphatic/Immunologic: negative  Endocrine: negative      Objective:     /78  Pulse 85  SpO2 96%  Eyes: Conjunctivae/lids: Normal   ENT: Lips:  Normal  MSK: Normal  Cardiovascular: Peripheral edema none  Pulm: Breathing Normal  Neuro/Psych: Orientation: Normal; Mood/Affect: Normal, NAD, WDWN  Pt accompanied by: self  Following areas examined:   Scalp, face, eyelids, lips, neck, chest, abdomen, back, buttocks, and R&L upper and lower extremities.  Mae skin type:i   Findings:  Red smooth well-defined macules on trunk and extremities.  Tan WD smooth macules on face, neck, trunk, and extremities.  Rhytides, hypo/hyperpigmentation, and atrophy  Well circumscribed macules with symmetric color distribution on trunk and extremities.2-3mm.  Pink slightly scaly oval patches on flanks and thighs  Pink scaly macule on left temple and nasal tip  Assessment and Plan:  1) cherry angiomas, lentigines, multiple benign nevi  Treatment of these lesions would be purely cosmetic and not medically neccessary.  Lesion may recur and/or may not completely resolve. May need additional treatment.  Different removal options including excision, cryotherapy, cautery and /or laser.      2) favor pityriasis rosea with pruritis  Discussed etiology and course.  Can take 4-6 weeks for resolution. Sometimes up to 20 weeks.  Triamcinolone :Apply a thin layer to affect area BID x 1-2 weeks. As needed. Dont use on face or body folds.  Discussed side effects of topical  steroids including but not limited to atrophy (skin thinning), striae (stretch marks) telangiectasias, steroid acne, and others. Do not apply to normal skin. Do not apply to discolored skin that does not have rash present. Educated patient on post inflammatory hyperpigmentation.     3) ak x 2 and solar elastosis  LN2 for 5 seconds x 2. Discussed AE include hypopigmentation (white spot) and recurrence. Follow up in 2-3 months to recheck lesions. There is a 0.025%-20% chance that AKs can develop into a SCC.   Discussed treating with LN2 vs PDT vs Efudex. Pt elected LN2    Signs and Symptoms of non-melanoma skin cancer and ABCDEs of melanoma reviewed with patient. Patient encouraged to perform monthly self skin exams and educated on how to perform them. UV precautions reviewed with patient. Patient was asked about new or changing moles/lesions on body.     Wear a sunscreen with at least SPF 30 on your face, ears, neck and V of the chest daily. Wear sunscreen on other areas of the body if those areas are exposed to the sun throughout the day. Sunscreens can contain physical and/or chemical blockers. Physical blockers are less likely to clog pores, these include zinc oxide and titanium dioxide. Reapply every two hour and after swimming. Sunscreen examples include Neutrogena, CeraVe, Blue Lizard, Elta MD and many others.        Follow up in 2-3 months to recheck aks. Yearly for FBE.

## 2018-09-10 NOTE — MR AVS SNAPSHOT
After Visit Summary   9/10/2018    Werner Alvarez    MRN: [id number]           Patient Information     Date Of Birth          1955        Visit Information        Provider Department      9/10/2018 10:20 AM Omaira Harding PA-C [facility]        Today's Diagnoses     Pityriasis rosea    -  1      Care Instructions    Pityriasis rosea  Triamcinolone :Apply a thin layer to affect area BID x 1-2 weeks. As needed. Dont use on face or body folds.       Proper skin care from [facility]:    -Eliminate harsh soaps as they strip the natural oils from the skin, often resulting in dry itchy skin ( i.e. Dial, Zest, Anahi Spring)  -Use mild soaps such as Cetaphil or Dove Sensitive Skin in the shower. You do not need to use soap on arms, legs, and trunk every time you shower unless visibly soiled.   -Avoid hot or cold showers.  -After showering, lightly dry off and apply moisturizing within 2-3 minutes. This will help trap moisture in the skin.   -Aggressive use of a moisturizer at least 1-2 times a day to the entire body (including -Vanicream, Cetaphil, Aquaphor or Cerave) and moisturize hands after every washing.  -We recommend using moisturizers that come in a tub that needs to be scooped out, not a pump. This has more of an oil base. It will hold moisture in your skin much better than a water base moisturizer. The above recommended are non-pore clogging.           Wear a sunscreen with at least SPF 30 on your face, ears, neck and V of the chest daily. Wear sunscreen on other areas of the body if those areas are exposed to the sun throughout the day. Sunscreens can contain physical and/or chemical blockers. Physical blockers are less likely to clog pores, these include zinc oxide and titanium dioxide. Reapply every two hour and after swimming. Sunscreen examples include Neutrogena, CeraVe, Blue Lizard, Elta MD and many others.    UV radiation  UVA radiation  remains constant throughout the day and throughout the year. It is a longer wavelength than UVB and therefore penetrates deeper into the skin leading to immediate and delayed tanning, photoaging, and skin cancer. 70-80% of UVA and UVB radiation occurs between the hours of 10am-2pm.  UVB radiation  UVB radiation causes the most harmful effects and is more significant during the summer months. However, snow and ice can reflect UVB radiation leading to skin damage during the winter months as well. UVB radiation is responsible for tanning, burning, inflammation, delayed erythema (pinkness), pigmentation (brown spots), and skin cancer.   Just because you do not burn or are not developing a tan does not mean that you are not damaging your skin. A 15 minute drive to and from work for 30 years an lead to chronic sun damage of the skin. It is important to wear a broad spectrum (both UVA and UVB) sunscreen EVERY day with at least 30 SPF. Apply to face, ears, neck and v of the chest as this is where most of our sun exposure is. Reapply sunscreen every two hours if you plan on being outside.   Erasmo Dhlaiwal. Clinical Dermatology: A Color Guide to Diagnosis and Therapy. Elsevier, 2016.     WOUND CARE INSTRUCTIONS  FOR CRYOSURGERY        This area treated with liquid nitrogen will form a blister. You do not need to bandage the area until after the blister forms and breaks (which may be a few days).  When the blister breaks, begin daily dressing changes as follows:    1) Clean and dry the area with tap water using clean Q-tip or sterile gauze pad.    2) Apply Polysporin ointment or Bacitracin ointment over entire wound.  Do NOT use Neosporin ointment.    3) Cover the wound with a band-aid or sterile non-stick gauze pad and micropore paper tape.      REPEAT THESE INSTRUCTIONS AT LEAST ONCE A DAY UNTIL THE WOUND HAS COMPLETELY HEALED.        It is an old wives tale that a wound heals better when it is exposed to air and allowed to  dry out. The wound will heal faster with a better cosmetic result if it is kept moist with ointment and covered with a bandage.  Do not let the wound dry out.      IMPORTANT INFORMATION ON REVERSE SIDE    Supplies Needed:     *Cotton tipped applicators (Q-tips)   *Polysporin ointment or Bacitracin ointment (NOT NEOSPORIN)   *Band-aids, or non stick gauze pads and micropore paper tape                PATIENT INFORMATION    During the healing process you will notice a number of changes. All wounds develop a small halo of redness surrounding the wound.  This means healing is occurring. Severe itching with extensive redness usually indicates sensitivity to the ointment or bandage tape used to dress the wound.  You should call our office if this develops.      Swelling and/or discoloration around your surgical site is common, particularly when performed around the eye.    All wounds normally drain.  The larger the wound the more drainage there will be.  After 7-10 days, you will notice the wound beginning to shrink and new skin will begin to grow.  The wound is healed when you can see skin has formed over the entire area.  A healed wound has a healthy, shiny look to the surface and is red to dark pink in color to normalize.  Wounds may take approximately 4-6 weeks to heal.  Larger wounds may take 6-8 weeks.  After the wound is healed you may discontinue dressing changes.    You may experience a sensation of tightness as your wound heals. This is normal and will gradually subside.    Your healed wound may be sensitive to temperature changes. This sensitivity improves with time, but if you re having a lot of discomfort, try to avoid temperature extremes.    Patients frequently experience itching after their wound appears to have healed because of the continue healing under the skin.  Plain Vaseline will help relieve the itching.                   Follow-ups after your visit        Your next 10 appointments already scheduled      Sep 12, 2018  9:00 AM CDT   [facility] with [facility]    [address] [address] [phone]              Who to contact     If you have questions or need follow up information about today's clinic visit or your schedule please contact [facility] directly at [phone].  Normal or non-critical lab and imaging results will be communicated to you by [employer], letter or phone within 4 business days after the clinic has received the results. If you do not hear from us within 7 days, please contact the clinic through [employer] or phone. If you have a critical or abnormal lab result, we will notify you by phone as soon as possible.  Submit refill requests through [employer] or call your pharmacy and they will forward the refill request to us. Please allow 3 business days for your refill to be completed.          Additional Information About Your Visit        [employer] Information     [employer] gives you secure access to your electronic health record. If you see a primary care provider, you can also send messages to your care team and make appointments. If you have questions, please call your primary care clinic.  If you do not have a primary care provider, please call [phone] and they will assist you.        Care EveryWhere ID     This is your Care EveryWhere ID. This could be used by other organizations to access your [place] medical records  [id number]        Your Vitals Were     Pulse Pulse Oximetry                85 96%           Blood Pressure from Last 3 Encounters:   09/10/18 117/78   08/18/18 120/70   07/09/18 116/70    Weight from Last 3 Encounters:   08/18/18 113.5 kg (250 lb 5 oz)   07/09/18 112 kg (247 lb)   06/27/17 108 kg (238 lb)              Today, you had the following     No orders found for display         Today's Medication Changes           These changes are accurate as of 9/10/18 10:33 AM.  If you have any questions, ask your nurse or doctor.               Start taking these medicines.        Dose/Directions    triamcinolone 0.5 % cream   Commonly known as:  KENALOG   Used for:  Pityriasis rosea   Started by:  Omaira Harding PA-C        Apply a thin layer to affect area BID x 1-2 weeks. As needed. Dont use on face or body folds.   Quantity:  60 g   Refills:  0            Where to get your medicines      These medications were sent to [id number] - [place], [place] - [address] [facility]  [address] [facility] [address]    Hours:  Added 10/26 CK Checked with pharmacy Phone:  [phone]     triamcinolone 0.5 % cream                Primary Care Provider Office Phone # Fax #    Guzman Zavala [phone] [address] [facility] [address]        Equal Access to Services     [facility] AH: Hadii aad ku hadasho Soomaali, waaxda luqadaha, qaybta kaalmada adeegyada, waxay idiin hayaan adeeg kharash lamarixa . So [facility] [phone].    ATENCIÓN: Si habla español, tiene a quiroz disposición servicios gratuitos de asistencia lingüística. Llame al [phone].    We comply with applicable federal civil rights laws and [place] laws. We do not discriminate on the basis of race, color, national origin, age, disability, sex, sexual orientation, or gender identity.            Thank you!     Thank you for choosing [facility]  for your care. Our goal is always to provide you with excellent care. Hearing back from our patients is one way we can continue to improve our services. Please take a few minutes to complete the written survey that you may receive in the mail after your visit with us. Thank you!             Your Updated Medication List - Protect others around you: Learn how to safely use, store and throw away your medicines at www.disposemymeds.org.          This list is  accurate as of 9/10/18 10:33 AM.  Always use your most recent med list.                   Brand Name Dispense Instructions for use Diagnosis    aspirin 81 MG tablet     30 tablet    Take 1 tablet (81 mg) by mouth daily        order for DME      [id number] CPAP 13 CM H2O WISP NASAL MASK (LARGE)        sildenafil 100 MG tablet    VIAGRA    6 tablet    Take 0.5-1 tablets ( mg) by mouth daily as needed for erectile dysfunction Take 30 min to 4 hours before intercourse.    Erectile dysfunction, unspecified erectile dysfunction type       triamcinolone 0.5 % cream    KENALOG    60 g    Apply a thin layer to affect area BID x 1-2 weeks. As needed. Dont use on face or body folds.    Pityriasis rosea

## 2018-10-25 ENCOUNTER — OFFICE VISIT (OUTPATIENT)
Dept: URGENT CARE | Facility: URGENT CARE | Age: 63
End: 2018-10-25
Payer: COMMERCIAL

## 2018-10-25 VITALS
HEART RATE: 65 BPM | DIASTOLIC BLOOD PRESSURE: 72 MMHG | RESPIRATION RATE: 16 BRPM | OXYGEN SATURATION: 94 % | SYSTOLIC BLOOD PRESSURE: 108 MMHG | TEMPERATURE: 97.8 F

## 2018-10-25 DIAGNOSIS — J20.9 ACUTE BRONCHITIS, UNSPECIFIED ORGANISM: Primary | ICD-10-CM

## 2018-10-25 PROCEDURE — 99213 OFFICE O/P EST LOW 20 MIN: CPT | Performed by: FAMILY MEDICINE

## 2018-10-25 RX ORDER — CODEINE PHOSPHATE AND GUAIFENESIN 10; 100 MG/5ML; MG/5ML
SOLUTION ORAL
Qty: 120 ML | Refills: 0 | Status: SHIPPED | OUTPATIENT
Start: 2018-10-25 | End: 2019-11-01

## 2018-10-25 RX ORDER — BENZONATATE 200 MG/1
200 CAPSULE ORAL 3 TIMES DAILY PRN
Qty: 21 CAPSULE | Refills: 0 | Status: SHIPPED | OUTPATIENT
Start: 2018-10-25 | End: 2019-11-01

## 2018-10-25 NOTE — PROGRESS NOTES
SUBJECTIVE: Werner Alvarez is a 63 year old male presenting with a chief complaint of nasal congestion and cough .  Onset of symptoms was 2 week(s) ago.  Course of illness is same.    Severity moderate  Current and Associated symptoms: stuffy nose  Treatment measures tried include Tylenol/Ibuprofen.  Predisposing factors include None.    No past medical history on file.  Allergies   Allergen Reactions     Penicillins Unknown     Since childhood     Social History   Substance Use Topics     Smoking status: Former Smoker     Packs/day: 1.00     Years: 25.00     Types: Cigarettes     Quit date: 7/29/2014     Smokeless tobacco: Never Used     Alcohol use 0.0 oz/week     0 Standard drinks or equivalent per week      Comment: Socially       ROS:  SKIN: no rash  GI: no vomiting    OBJECTIVE:  /72  Pulse 65  Temp 97.8  F (36.6  C) (Oral)  Resp 16  SpO2 94%   GENERAL APPEARANCE: healthy, alert and no distress  EYES: EOMI,  PERRL, conjunctiva clear  HENT: ear canals and TM's normal.  Nose and mouth without ulcers, erythema or lesions  NECK: supple, nontender, no lymphadenopathy  RESP: lungs clear to auscultation - no rales, rhonchi or wheezes  SKIN: no suspicious lesions or rashes      ICD-10-CM    1. Acute bronchitis, unspecified organism J20.9 benzonatate (TESSALON) 200 MG capsule     guaiFENesin-codeine (ROBITUSSIN AC) 100-10 MG/5ML SOLN solution       Fluids/Rest, f/u if worse/not any better

## 2018-10-25 NOTE — MR AVS SNAPSHOT
After Visit Summary   10/25/2018    Werner Alvarez    MRN: [id number]           Patient Information     Date Of Birth          1955        Visit Information        Provider Department      10/25/2018 9:05 AM Guzman Caceres DO [facility]        Today's Diagnoses     Acute bronchitis, unspecified organism    -  1       Follow-ups after your visit        Your next 10 appointments already scheduled     Oct 27, 2018 11:15 AM CDT   SHORT with Guzman Zavlaa MD   [facility] ([facility])    [address] [place] [address] [id number] [phone]              Who to contact     If you have questions or need follow up information about today's clinic visit or your schedule please contact [facility] directly at [phone].  Normal or non-critical lab and imaging results will be communicated to you by MyChart, letter or phone within 4 business days after the clinic has received the results. If you do not hear from us within 7 days, please contact the clinic through MyChart or phone. If you have a critical or abnormal lab result, we will notify you by phone as soon as possible.  Submit refill requests through [employer] or call your pharmacy and they will forward the refill request to us. Please allow 3 business days for your refill to be completed.          Additional Information About Your Visit        MyChart Information     [employer] gives you secure access to your electronic health record. If you see a primary care provider, you can also send messages to your care team and make appointments. If you have questions, please call your primary care clinic.  If you do not have a primary care provider, please call [phone] and they will assist you.        Care EveryWhere ID     This is your Care EveryWhere ID. This could be used by other organizations to access your  [facility] medical records  [id number]        Your Vitals Were     Pulse Temperature Respirations Pulse Oximetry          65 97.8  F (36.6  C) (Oral) 16 94%         Blood Pressure from Last 3 Encounters:   10/25/18 108/72   09/10/18 117/78   08/18/18 120/70    Weight from Last 3 Encounters:   08/18/18 250 lb 5 oz (113.5 kg)   07/09/18 247 lb (112 kg)   06/27/17 238 lb (108 kg)              Today, you had the following     No orders found for display         Today's Medication Changes          These changes are accurate as of 10/25/18  9:28 AM.  If you have any questions, ask your nurse or doctor.               Start taking these medicines.        Dose/Directions    benzonatate 200 MG capsule   Commonly known as:  TESSALON   Used for:  Acute bronchitis, unspecified organism   Started by:  Guzman Caceres DO        Dose:  200 mg   Take 1 capsule (200 mg) by mouth 3 times daily as needed for cough   Quantity:  21 capsule   Refills:  0       guaiFENesin-codeine 100-10 MG/5ML Soln solution   Commonly known as:  ROBITUSSIN AC   Used for:  Acute bronchitis, unspecified organism   Started by:  Guzman Caceres DO        1-2 tsp po q 4-6hrs prn cough   Quantity:  120 mL   Refills:  0            Where to get your medicines      These medications were sent to [facility] [address] [address]     Phone:  [phone]     benzonatate 200 MG capsule         Some of these will need a paper prescription and others can be bought over the counter.  Ask your nurse if you have questions.     Bring a paper prescription for each of these medications     guaiFENesin-codeine 100-10 MG/5ML Soln solution                Primary Care Provider Office Phone # Fax #    Guzman Zavala [phone] [address]  [facility] [id number]        Equal Access to Services     ANGELIA ZHENG AH: Aneta Doll, robinson ga, valentina  carolina valadez eddiebautista kassandra sanchez. So [facility] [phone].    ATENCIÓN: Si jamie canada, tiene a quiroz disposición servicios gratuitos de asistencia lingüística. Chris al [phone].    We comply with applicable federal civil rights laws and [place] laws. We do not discriminate on the basis of race, color, national origin, age, disability, sex, sexual orientation, or gender identity.            Thank you!     Thank you for choosing [facility]  for your care. Our goal is always to provide you with excellent care. Hearing back from our patients is one way we can continue to improve our services. Please take a few minutes to complete the written survey that you may receive in the mail after your visit with us. Thank you!             Your Updated Medication List - Protect others around you: Learn how to safely use, store and throw away your medicines at www.disposemymeds.org.          This list is accurate as of 10/25/18  9:28 AM.  Always use your most recent med list.                   Brand Name Dispense Instructions for use Diagnosis    aspirin 81 MG tablet     30 tablet    Take 1 tablet (81 mg) by mouth daily        benzonatate 200 MG capsule    TESSALON    21 capsule    Take 1 capsule (200 mg) by mouth 3 times daily as needed for cough    Acute bronchitis, unspecified organism       guaiFENesin-codeine 100-10 MG/5ML Soln solution    ROBITUSSIN AC    120 mL    1-2 tsp po q 4-6hrs prn cough    Acute bronchitis, unspecified organism       order for DME      [id number] CPAP 13 CM H2O WISP NASAL MASK (LARGE)        sildenafil 100 MG tablet    VIAGRA    6 tablet    Take 0.5-1 tablets ( mg) by mouth daily as needed for erectile dysfunction Take 30 min to 4 hours before intercourse.    Erectile dysfunction, unspecified erectile dysfunction type       triamcinolone 0.5 % cream    KENALOG    60 g    Apply a thin layer to affect area BID x 1-2 weeks. As needed.  Dont use on face or body folds.    Pityriasis rosea

## 2019-05-07 ENCOUNTER — OFFICE VISIT (OUTPATIENT)
Dept: SLEEP MEDICINE | Facility: CLINIC | Age: 64
End: 2019-05-07
Payer: COMMERCIAL

## 2019-05-07 VITALS
OXYGEN SATURATION: 96 % | BODY MASS INDEX: 31.5 KG/M2 | RESPIRATION RATE: 18 BRPM | HEART RATE: 77 BPM | DIASTOLIC BLOOD PRESSURE: 71 MMHG | HEIGHT: 70 IN | SYSTOLIC BLOOD PRESSURE: 120 MMHG | WEIGHT: 220 LBS

## 2019-05-07 DIAGNOSIS — E66.811 OBESITY (BMI 30.0-34.9): ICD-10-CM

## 2019-05-07 DIAGNOSIS — G47.33 OSA (OBSTRUCTIVE SLEEP APNEA): Primary | ICD-10-CM

## 2019-05-07 PROCEDURE — 99214 OFFICE O/P EST MOD 30 MIN: CPT | Performed by: NURSE PRACTITIONER

## 2019-05-07 ASSESSMENT — MIFFLIN-ST. JEOR: SCORE: 1804.16

## 2019-05-07 NOTE — PATIENT INSTRUCTIONS
Your BMI is Body mass index is 31.57 kg/m .  Weight management is a personal decision.  If you are interested in exploring weight loss strategies, the following discussion covers the approaches that may be successful. Body mass index (BMI) is one way to tell whether you are at a healthy weight, overweight, or obese. It measures your weight in relation to your height.  A BMI of 18.5 to 24.9 is in the healthy range. A person with a BMI of 25 to 29.9 is considered overweight, and someone with a BMI of 30 or greater is considered obese. More than two-thirds of American adults are considered overweight or obese.  Being overweight or obese increases the risk for further weight gain. Excess weight may lead to heart disease and diabetes.  Creating and following plans for healthy eating and physical activity may help you improve your health.  Weight control is part of healthy lifestyle and includes exercise, emotional health, and healthy eating habits. Careful eating habits lifelong are the mainstay of weight control. Though there are significant health benefits from weight loss, long-term weight loss with diet alone may be very difficult to achieve- studies show long-term success with dietary management in less than 10% of people. Attaining a healthy weight may be especially difficult to achieve in those with severe obesity. In some cases, medications, devices and surgical management might be considered.  What can you do?  If you are overweight or obese and are interested in methods for weight loss, you should discuss this with your provider.     Consider reducing daily calorie intake by 500 calories.     Keep a food journal.     Avoiding skipping meals, consider cutting portions instead.    Diet combined with exercise helps maintain muscle while optimizing fat loss. Strength training is particularly important for building and maintaining muscle mass. Exercise helps reduce stress, increase energy, and improves fitness.  Increasing exercise without diet control, however, may not burn enough calories to loose weight.       Start walking three days a week 10-20 minutes at a time    Work towards walking thirty minutes five days a week     Eventually, increase the speed of your walking for 1-2 minutes at time    In addition, we recommend that you review healthy lifestyles and methods for weight loss available through the National Institutes of Health patient information sites:  http://win.niddk.nih.gov/publications/index.htm    And look into health and wellness programs that may be available through your health insurance provider, employer, local community center, or [employer]ristin club.    Weight management plan: Patient was referred to their PCP to discuss a diet and exercise plan.

## 2019-05-07 NOTE — PROGRESS NOTES
Cc: Patient returns here today in follow up for review of compliance and response to CPAP therapy and to obtain a new CPAP prescription.    HPI:HISTORY OF PRESENT ILLNESS:  Mr. Werner Alvarez is a 61-year-old male with a 10/2007 polysomnogram revealing an AHI of 51, supine AHI of 60 and lowest O2 sat of 58%.  On 10/28/2007 he had a titration study.  His residual AHI was 22.2, but he had significant problems with leak.  Previous DME was [employer], but chose to [facility] for his new Apap at a set pressure of 13 cm of water set up on 01/172017. He was seen by me on 1/12/17 without difficulties with therapy.   Comorbidities: see below.  Sleep comorbidities     New concerns/changes in health: Denies    Sleep Review of Systems:    Equipment issues: no     Snoring, snort arousals, gasping while on therapy: no     Bedpartner c/o's of cpap: no     Heated humidity problems with rainout/excessive dryness, sore throat: winter     Opening of mouth while on therapy/excessive leak: full nose, no     Swallowing airN/a     Skin problems: n/a     Insufficient sleep, devoting <7 or more hours to sleep: no     Problems falling or staying asleep with cpap: no     RLS: no    Naps: : [employer] nap      Download today: Set pressure 13, average usage days used 8 hours 38 minutes, use was 28/30 days, residual AHI 1.6, 95th percentile leak 43.9 L/min, unknown 0.4 rear index 0.7 central 0.1      Full report scanned into medical record    Response to therapy:gosh yes, it is good  Willingness to continue: yes    Allergies:    Allergies   Allergen Reactions     Penicillins Unknown     Since childhood       Medications:    Current Outpatient Medications   Medication Sig Dispense Refill     aspirin 81 MG tablet Take 1 tablet (81 mg) by mouth daily 30 tablet      order for DME [id number] CPAP  13 CM H2O  WISP NASAL MASK (LARGE)       sildenafil (VIAGRA) 100 MG tablet Take 0.5-1 tablets ( mg) by mouth daily as needed for erectile dysfunction Take  "30 min to 4 hours before intercourse. 6 tablet 11       Problem List:  Patient Active Problem List    Diagnosis Date Noted     Pulmonary nodules 07/07/2018     Priority: High      2 tiny nodules in 7/17; recheck yearly       Erectile dysfunction, unspecified erectile dysfunction type 09/30/2016     Priority: Medium     Preventive measure 05/04/2016     Priority: Medium     Hx of yearly CPE; not yearly PSA; 2.66 in 5/16;  2.86 in 6/17 ; 2.22 in 7/18                   Colonoscopy neg in 2015; recheck in 10 yrs.          Tobacco abuse 05/04/2016     Priority: Medium     Off and on for many years; quit 2014.      approx 20 pack yrs       Obesity (BMI 30.0-34.9) 07/29/2015     Priority: Medium     Obstructive sleep apnea 07/29/2015     Priority: Medium     Problem list name updated by automated process. Provider to review          Past Medical/Surgical History:  No past medical history on file.  Past Surgical History:   Procedure Laterality Date     ARTHROSCOPY KNEE RT/LT Bilateral            Physical Examination:  Vitals: /71   Pulse 77   Resp 18   Ht 1.778 m (5' 10\")   Wt 99.8 kg (220 lb)   SpO2 96%   BMI 31.57 kg/m    BMI= Body mass index is 31.57 kg/m .         [place] Total Score 5/7/2019   Total score - [place] 1       GENERAL APPEARANCE: healthy, alert and no distress    A/P: Patient has an excellent response to CPAP for his obstructive sleep apnea unfortunately, with a pressure of 13 he likely needs to renew his mask and headgear on a more regular basis then he has at this point in time.  Since his residual apnea is quite low in light of the baseline severity, we discussed reducing his CPAP pressure to perhaps improve his tolerance of his interface.  The following plan of care has been developed:      1. GREGORY: [facility] his DME, new CPAP in 2015, dl in 3 wks on new supples via S TM, I will update him on the download in 3 weeks time.  2. Obesity: Weight loss would likely reduce severity of his obstructive " sleep apnea, this is encouraged.    Time spent with patient is 25 minutes, of which >50% was spent in counseling, education and coordination of care.

## 2019-07-10 ENCOUNTER — OFFICE VISIT (OUTPATIENT)
Dept: FAMILY MEDICINE | Facility: CLINIC | Age: 64
End: 2019-07-10
Payer: COMMERCIAL

## 2019-07-10 VITALS
BODY MASS INDEX: 30.64 KG/M2 | OXYGEN SATURATION: 97 % | DIASTOLIC BLOOD PRESSURE: 68 MMHG | HEART RATE: 80 BPM | WEIGHT: 214 LBS | RESPIRATION RATE: 20 BRPM | TEMPERATURE: 98.7 F | HEIGHT: 70 IN | SYSTOLIC BLOOD PRESSURE: 110 MMHG

## 2019-07-10 DIAGNOSIS — Z00.00 ROUTINE GENERAL MEDICAL EXAMINATION AT A HEALTH CARE FACILITY: Primary | ICD-10-CM

## 2019-07-10 DIAGNOSIS — E66.811 OBESITY (BMI 30.0-34.9): ICD-10-CM

## 2019-07-10 DIAGNOSIS — Z12.5 SCREENING FOR PROSTATE CANCER: ICD-10-CM

## 2019-07-10 DIAGNOSIS — R91.8 PULMONARY NODULES: ICD-10-CM

## 2019-07-10 DIAGNOSIS — Z29.9 PREVENTIVE MEASURE: ICD-10-CM

## 2019-07-10 DIAGNOSIS — G47.33 OBSTRUCTIVE SLEEP APNEA: ICD-10-CM

## 2019-07-10 LAB
ALBUMIN UR-MCNC: NEGATIVE MG/DL
APPEARANCE UR: CLEAR
BILIRUB UR QL STRIP: NEGATIVE
COLOR UR AUTO: YELLOW
GLUCOSE UR STRIP-MCNC: NEGATIVE MG/DL
HGB UR QL STRIP: ABNORMAL
KETONES UR STRIP-MCNC: NEGATIVE MG/DL
LEUKOCYTE ESTERASE UR QL STRIP: NEGATIVE
NITRATE UR QL: NEGATIVE
PH UR STRIP: 6.5 PH (ref 5–7)
PSA SERPL-ACNC: 2.51 UG/L (ref 0–4)
RBC #/AREA URNS AUTO: NORMAL /HPF
SOURCE: ABNORMAL
SP GR UR STRIP: 1.01 (ref 1–1.03)
UROBILINOGEN UR STRIP-ACNC: 0.2 EU/DL (ref 0.2–1)
WBC #/AREA URNS AUTO: NORMAL /HPF

## 2019-07-10 PROCEDURE — 36415 COLL VENOUS BLD VENIPUNCTURE: CPT | Performed by: INTERNAL MEDICINE

## 2019-07-10 PROCEDURE — 81001 URINALYSIS AUTO W/SCOPE: CPT | Performed by: INTERNAL MEDICINE

## 2019-07-10 PROCEDURE — G0103 PSA SCREENING: HCPCS | Performed by: INTERNAL MEDICINE

## 2019-07-10 PROCEDURE — 80061 LIPID PANEL: CPT | Performed by: INTERNAL MEDICINE

## 2019-07-10 PROCEDURE — 99396 PREV VISIT EST AGE 40-64: CPT | Performed by: INTERNAL MEDICINE

## 2019-07-10 PROCEDURE — 80053 COMPREHEN METABOLIC PANEL: CPT | Performed by: INTERNAL MEDICINE

## 2019-07-10 ASSESSMENT — MIFFLIN-ST. JEOR: SCORE: 1776.95

## 2019-07-10 NOTE — PROGRESS NOTES
SUBJECTIVE:   CC: Werner Alvarez is an 63 year old male who presents for preventative health visit.     Healthy Habits:     Getting at least 3 servings of Calcium per day:  Yes    Bi-annual eye exam:  Yes    Dental care twice a year:  Yes    Sleep apnea or symptoms of sleep apnea:  Sleep apnea    Diet:  Regular (no restrictions)    Frequency of exercise:  2-3 days/week    Duration of exercise:  15-30 minutes    Taking medications regularly:  Yes    Medication side effects:  None    PHQ-2 Total Score: 0    Additional concerns today:  No           he continues to lose weight.       Has cut back CHO and he feels great.              Wants labs.              Willing to have another chest CT.               Today's PHQ-2 Score:   PHQ-2 (  [employer]) 7/10/2019   Q1: Little interest or pleasure in doing things 0   Q2: Feeling down, depressed or hopeless 0   PHQ-2 Score 0   Q1: Little interest or pleasure in doing things Not at all   Q2: Feeling down, depressed or hopeless Not at all   PHQ-2 Score 0       Abuse: Current or Past(Physical, Sexual or Emotional)- No  Do you feel safe in your environment? Yes    Social History     Tobacco Use     Smoking status: Former Smoker     Packs/day: 1.00     Years: 25.00     Pack years: 25.00     Types: Cigarettes     Last attempt to quit: 2014     Years since quittin.9     Smokeless tobacco: Never Used   Substance Use Topics     Alcohol use: Yes     Alcohol/week: 0.0 oz     Comment: Socially     If you drink alcohol do you typically have >3 drinks per day or >7 drinks per week? No    Alcohol Use 7/10/2019   Prescreen: >3 drinks/day or >7 drinks/week? No   Prescreen: >3 drinks/day or >7 drinks/week? -       Last PSA:   PSA   Date Value Ref Range Status   07/10/2018 2.22 0 - 4 ug/L Final     Comment:     Assay Method:  Chemiluminescence using Siemens Vista analyzer       Reviewed orders with patient. Reviewed health maintenance and updated orders accordingly - Yes  Patient  "Active Problem List    Diagnosis Date Noted     Pulmonary nodules 07/07/2018     Priority: High      2 tiny nodules in 7/17;no change in 7/18 and \"considered benign\"                Tobacco abuse 05/04/2016     Priority: High     Off and on for many years; quit 2014.      approx 20 pack yrs       Erectile dysfunction, unspecified erectile dysfunction type 09/30/2016     Priority: Medium     Obesity (BMI 30.0-34.9) 07/29/2015     Priority: Medium     Obstructive sleep apnea 07/29/2015     Priority: Medium     Uses CPAP       Preventive measure 05/04/2016     Priority: Low     Hx of yearly CPE; not yearly PSA; 2.66 in 5/16;  2.86 in 6/17 ; 2.22 in 7/18                   Colonoscopy neg in 2015; recheck in 10 yrs.          Past Surgical History:   Procedure Laterality Date     ARTHROSCOPY KNEE RT/LT Bilateral        BP Readings from Last 3 Encounters:   07/10/19 110/68   05/07/19 120/71   10/25/18 108/72    Wt Readings from Last 3 Encounters:   07/10/19 97.1 kg (214 lb)   05/07/19 99.8 kg (220 lb)   08/18/18 113.5 kg (250 lb 5 oz)                  Current Outpatient Medications   Medication Sig Dispense Refill     aspirin 81 MG tablet Take 1 tablet (81 mg) by mouth daily 30 tablet      order for DME [id number] CPAP  13 CM H2O  WISP NASAL MASK (LARGE)         Reviewed and updated as needed this visit by clinical staff  Tobacco  Allergies  Meds  Med Hx  Surg Hx  Fam Hx  Soc Hx        Reviewed and updated as needed this visit by Provider            Review of Systems  CONSTITUTIONAL:NEGATIVE  for chills and fever   INTEGUMENTARY/SKIN: NEGATIVE for worrisome rashes, moles or lesions  EYES: NEGATIVE for vision changes or irritation  ENT: NEGATIVE for ear, mouth and throat problems  RESP: NEGATIVE for significant cough or SOB  CV: NEGATIVE for chest pain, palpitations or peripheral edema  GI: NEGATIVE for nausea, abdominal pain, heartburn, or change in bowel habits   male: negative for dysuria, hematuria, decreased " "urinary stream, erectile dysfunction, urethral discharge  MUSCULOSKELETAL: NEGATIVE for significant arthralgias or myalgia  NEURO: NEGATIVE for weakness, dizziness or paresthesias  PSYCHIATRIC: NEGATIVE for changes in mood or affect    OBJECTIVE:   /68 (BP Location: Left arm, Patient Position: Chair, Cuff Size: Adult Large)   Pulse 80   Temp 98.7  F (37.1  C)   Resp 20   Ht 1.778 m (5' 10\")   Wt 97.1 kg (214 lb)   SpO2 97%   BMI 30.71 kg/m      Physical Exam  GENERAL: healthy, alert and no distress  GENERAL: over weight  EYES: Eyes grossly normal to inspection, PERRL and conjunctivae and sclerae normal  HENT: ear canals and TM's normal, nose and mouth without ulcers or lesions  NECK: no adenopathy, no asymmetry, masses, or scars and thyroid normal to palpation  RESP: lungs clear to auscultation - no rales, rhonchi or wheezes  CV: regular rate and rhythm, normal S1 S2, no S3 or S4, no murmur, click or rub, no peripheral edema and peripheral pulses strong  ABDOMEN: soft, nontender, no hepatosplenomegaly, no masses and bowel sounds normal   (male): normal male genitalia without lesions or urethral discharge, no hernia  RECTAL: normal sphincter tone, no rectal masses, prostate normal size, smooth, nontender without nodules or masses  MS: no gross musculoskeletal defects noted, no edema  SKIN: no suspicious lesions or rashes  NEURO: Normal strength and tone, mentation intact and speech normal  PSYCH: mentation appears normal, affect normal/bright    Diagnostic Test Results:  pending    ASSESSMENT/PLAN:   Werner was seen today for physical.    Diagnoses and all orders for this visit:    Routine general medical examination at a health care facility  -     PSA, screen  -     Comprehensive metabolic panel (BMP + Alb, Alk Phos, ALT, AST, Total. Bili, TP)  -     Lipid Profile (Chol, Trig, HDL, LDL calc)  -     UA reflex to Microscopic    Pulmonary nodules  -     RADIOLOGY REFERRAL  -     Comprehensive metabolic " "panel (BMP + Alb, Alk Phos, ALT, AST, Total. Bili, TP)    Obstructive sleep apnea    Screening for prostate cancer    Obesity (BMI 30.0-34.9)     Summary and implications:   Doing great with weight loss.         He wants labs done.                    Plan chest CT.               Patient Instructions   I will let you know your lab results.       Return in about 1 year (around 7/10/2020) for yearly wellness visit, labs will be needed.    COUNSELING:   Reviewed preventive health counseling, as reflected in patient instructions  Special attention given to:        Regular exercise       Healthy diet/nutrition    Estimated body mass index is 31.57 kg/m  as calculated from the following:    Height as of this encounter: 1.778 m (5' 10\").    Weight as of 5/7/19: 99.8 kg (220 lb).     Weight management plan: Discussed healthy diet and exercise guidelines     reports that he quit smoking about 4 years ago. His smoking use included cigarettes. He has a 25.00 pack-year smoking history. He has never used smokeless tobacco.      Counseling Resources:  ATP IV Guidelines  Pooled Cohorts Equation Calculator  FRAX Risk Assessment  ICSI Preventive Guidelines  Dietary Guidelines for Americans, 2010  LiquidM's MyPlate  ASA Prophylaxis  Lung CA Screening    Guzman Zavala MD  [facility]   Results for orders placed or performed in visit on 07/10/19   PSA, screen   Result Value Ref Range    PSA 2.51 0 - 4 ug/L   Comprehensive metabolic panel (BMP + Alb, Alk Phos, ALT, AST, Total. Bili, TP)   Result Value Ref Range    Sodium 140 133 - 144 mmol/L    Potassium 4.4 3.4 - 5.3 mmol/L    Chloride 106 94 - 109 mmol/L    Carbon Dioxide 25 20 - 32 mmol/L    Anion Gap 9 3 - 14 mmol/L    Glucose 85 70 - 99 mg/dL    Urea Nitrogen 15 7 - 30 mg/dL    Creatinine 0.85 0.66 - 1.25 mg/dL    GFR Estimate >90 >60 mL/min/[1.73_m2]    GFR Estimate If Black >90 >60 mL/min/[1.73_m2]    Calcium 8.9 8.5 - 10.1 mg/dL    Bilirubin Total 0.4 " 0.2 - 1.3 mg/dL    Albumin 4.2 3.4 - 5.0 g/dL    Protein Total 7.8 6.8 - 8.8 g/dL    Alkaline Phosphatase 62 40 - 150 U/L    ALT 41 0 - 70 U/L    AST 23 0 - 45 U/L   Lipid Profile (Chol, Trig, HDL, LDL calc)   Result Value Ref Range    Cholesterol 162 <200 mg/dL    Triglycerides 60 <150 mg/dL    HDL Cholesterol 59 >39 mg/dL    LDL Cholesterol Calculated 91 <100 mg/dL    Non HDL Cholesterol 103 <130 mg/dL   UA reflex to Microscopic   Result Value Ref Range    Color Urine Yellow     Appearance Urine Clear     Glucose Urine Negative NEG^Negative mg/dL    Bilirubin Urine Negative NEG^Negative    Ketones Urine Negative NEG^Negative mg/dL    Specific Gravity Urine 1.015 1.003 - 1.035    Blood Urine Trace (A) NEG^Negative    pH Urine 6.5 5.0 - 7.0 pH    Protein Albumin Urine Negative NEG^Negative mg/dL    Urobilinogen Urine 0.2 0.2 - 1.0 EU/dL    Nitrite Urine Negative NEG^Negative    Leukocyte Esterase Urine Negative NEG^Negative    Source Midstream Urine    Urine Microscopic   Result Value Ref Range    WBC Urine 0 - 5 OTO5^0 - 5 /HPF    RBC Urine O - 2 OTO2^O - 2 /HPF     My chart message sent.          Your lab results are normal,including the liver,kidney,glucose,cholesterol,urine,and the PSA level.

## 2019-07-11 LAB
ALBUMIN SERPL-MCNC: 4.2 G/DL (ref 3.4–5)
ALP SERPL-CCNC: 62 U/L (ref 40–150)
ALT SERPL W P-5'-P-CCNC: 41 U/L (ref 0–70)
ANION GAP SERPL CALCULATED.3IONS-SCNC: 9 MMOL/L (ref 3–14)
AST SERPL W P-5'-P-CCNC: 23 U/L (ref 0–45)
BILIRUB SERPL-MCNC: 0.4 MG/DL (ref 0.2–1.3)
BUN SERPL-MCNC: 15 MG/DL (ref 7–30)
CALCIUM SERPL-MCNC: 8.9 MG/DL (ref 8.5–10.1)
CHLORIDE SERPL-SCNC: 106 MMOL/L (ref 94–109)
CHOLEST SERPL-MCNC: 162 MG/DL
CO2 SERPL-SCNC: 25 MMOL/L (ref 20–32)
CREAT SERPL-MCNC: 0.85 MG/DL (ref 0.66–1.25)
GFR SERPL CREATININE-BSD FRML MDRD: >90 ML/MIN/{1.73_M2}
GLUCOSE SERPL-MCNC: 85 MG/DL (ref 70–99)
HDLC SERPL-MCNC: 59 MG/DL
LDLC SERPL CALC-MCNC: 91 MG/DL
NONHDLC SERPL-MCNC: 103 MG/DL
POTASSIUM SERPL-SCNC: 4.4 MMOL/L (ref 3.4–5.3)
PROT SERPL-MCNC: 7.8 G/DL (ref 6.8–8.8)
SODIUM SERPL-SCNC: 140 MMOL/L (ref 133–144)
TRIGL SERPL-MCNC: 60 MG/DL

## 2019-07-18 ENCOUNTER — MYC MEDICAL ADVICE (OUTPATIENT)
Dept: FAMILY MEDICINE | Facility: CLINIC | Age: 64
End: 2019-07-18

## 2019-07-18 NOTE — TELEPHONE ENCOUNTER
Referrals:  6/27/2017 Dx was tobacco abuse  7/9/2018 Dx was pulmonary nodules  7/10/2019 Dx was pulmonary nodules.     Please see patient mychart question.

## 2019-07-21 ENCOUNTER — OFFICE VISIT (OUTPATIENT)
Dept: URGENT CARE | Facility: URGENT CARE | Age: 64
End: 2019-07-21
Payer: COMMERCIAL

## 2019-07-21 VITALS
DIASTOLIC BLOOD PRESSURE: 71 MMHG | TEMPERATURE: 97.5 F | RESPIRATION RATE: 19 BRPM | SYSTOLIC BLOOD PRESSURE: 109 MMHG | OXYGEN SATURATION: 95 % | HEART RATE: 105 BPM

## 2019-07-21 DIAGNOSIS — T63.441A BEE STING REACTION, ACCIDENTAL OR UNINTENTIONAL, INITIAL ENCOUNTER: Primary | ICD-10-CM

## 2019-07-21 PROCEDURE — 96372 THER/PROPH/DIAG INJ SC/IM: CPT | Performed by: FAMILY MEDICINE

## 2019-07-21 PROCEDURE — 99214 OFFICE O/P EST MOD 30 MIN: CPT | Mod: 25 | Performed by: FAMILY MEDICINE

## 2019-07-21 RX ORDER — DEXAMETHASONE SODIUM PHOSPHATE 4 MG/ML
20 VIAL (ML) INJECTION ONCE
Status: COMPLETED | OUTPATIENT
Start: 2019-07-21 | End: 2019-07-21

## 2019-07-21 RX ORDER — EPINEPHRINE 0.3 MG/.3ML
0.3 INJECTION SUBCUTANEOUS PRN
Qty: 1 EACH | Refills: 1 | Status: SHIPPED | OUTPATIENT
Start: 2019-07-21 | End: 2019-07-21

## 2019-07-21 RX ORDER — EPINEPHRINE 0.3 MG/.3ML
0.3 INJECTION SUBCUTANEOUS PRN
Qty: 1 EACH | Refills: 1 | Status: SHIPPED | OUTPATIENT
Start: 2019-07-21 | End: 2021-05-20

## 2019-07-21 RX ORDER — DIPHENHYDRAMINE HYDROCHLORIDE 50 MG/ML
50 INJECTION INTRAMUSCULAR; INTRAVENOUS ONCE
Status: COMPLETED | OUTPATIENT
Start: 2019-07-21 | End: 2019-07-21

## 2019-07-21 RX ADMIN — DIPHENHYDRAMINE HYDROCHLORIDE 50 MG: 50 INJECTION INTRAMUSCULAR; INTRAVENOUS at 14:53

## 2019-07-21 RX ADMIN — Medication 20 MG: at 14:51

## 2019-07-21 NOTE — PATIENT INSTRUCTIONS
If you have difficulty with breathing (tongue swelling, throat closing) call 911 immediately and use the epi pen that you were given today    Take benadryl 50mg every 8 hours to help with the rash/itching

## 2019-07-21 NOTE — PROGRESS NOTES
"Subjective:   Werner Alvarez is a 64 year old male who presents for   Chief Complaint   Patient presents with     Urgent Care     stung by a bee today - having difficulty breathing and hives over various parts of body.     Stung by bee 45 minutes ago. No hx of analphylaxis. Lip swelling but not tongue swelling. Got anxious and had a hard time breathing for a moment but feeling better. Accompanied by his significant other. Bee sting occurred on left lower leg. Has not passed out. No visual difficulties.     Stung a year ago by bees and had no reaction.     Accompanied by his significant other who drove them here.     Patient Active Problem List    Diagnosis Date Noted     Pulmonary nodules 07/07/2018     Priority: High      2 tiny nodules in 7/17;no change in 7/18 and \"considered benign\"                Erectile dysfunction, unspecified erectile dysfunction type 09/30/2016     Priority: Medium     Preventive measure 05/04/2016     Priority: Medium     Hx of yearly CPE; not yearly PSA; 2.66 in 5/16;  2.86 in 6/17 ; 2.22 in 7/18  ,2.51 in 7/19                Colonoscopy neg in 2015; recheck in 10 yrs.          Tobacco abuse 05/04/2016     Priority: Medium     Off and on for many years; quit 2014.      approx 20 pack yrs       Obesity (BMI 30.0-34.9) 07/29/2015     Priority: Medium     Obstructive sleep apnea 07/29/2015     Priority: Medium     Uses CPAP         Current Outpatient Medications   Medication     aspirin 81 MG tablet     EPINEPHrine (EPIPEN/ADRENACLICK/OR ANY BX GENERIC EQUIV) 0.3 MG/0.3ML injection 2-pack     order for DME     Current Facility-Administered Medications   Medication     diphenhydrAMINE (BENADRYL) injection 50 mg     ROS:   ROS: 10 point ROS neg other than the symptoms noted above in the HPI.    Objective:   /71   Pulse 105   Temp 97.5  F (36.4  C) (Oral)   Resp 19   SpO2 95% , There is no height or weight on file to calculate BMI.  Gen:  NAD, well-nourished, sitting in chair " comfortably  HEENT: EOMI, sclera anicteric, Head normocephalic, ; nares patent; moist mucous membranes  Neck: trachea midline, no thyromegaly  CV:  Hemodynamically stable, borderline tachy, on murmurs  Pulm:  no increased work of breathing , CTAB, no wheezes/rales/rhonchi   Left leg: small erythematous lesions of left anterior lower leg  Extrem: no cyanosis, edema or clubbing  Skin: diffuse erythematous papules of body sparing of the face  Psych: Euthymic, linear thoughts, normal rate of speech  Gait: normal  MSK: gross movement of all 4 extremities  Neuro: GCS 15    Assessment & Plan:   Werner Alvarez, 64 year old male who presents with:  Bee sting reaction, accidental or unintentional, initial encounter  - EPINEPHrine (EPIPEN/ADRENACLICK/OR ANY BX GENERIC EQUIV) 0.3 MG/0.3ML injection 2-pack  Dispense: 1 each; Refill: 1    Normotensive 's , 95% O2 - examination shows no swelling of posterior oropharynx with mild lip swelling- I do not consider this to be an anaphylactic reaction but a mild allergic reaction. Normal mentation. Slightly anxious patient. Ordered 20mg oral decadron and 50mg oral benadryl (was given IM by [facility] staff but patient should tolerate fine) at this time. Will continue to monitor blood pressure if worsening breathing or low BP will give epi pen.     Blood pressure checked on 3 occasions over the course of 60 minutes, stable for his baseline.  Normal mentation and feels comfortable going home. States the lip numbness has resolved.     Epi pen prescription to go home with, use if throat swelling or lip swelling - to call 911 if this occurs. Wife was also counseled on the instructions. His wife drove him and will do the driving home, patient beginning to feel drowsy.     Stefano Rios MD   [place] [facility]    The use of Dragon/Panopto dictation services may have been used to construct the content in this note; any grammatical or spelling errors are non-intentional. Please contact  the author of this note directly if you are in need of any clarification.

## 2019-07-25 ENCOUNTER — TRANSFERRED RECORDS (OUTPATIENT)
Dept: HEALTH INFORMATION MANAGEMENT | Facility: CLINIC | Age: 64
End: 2019-07-25

## 2019-08-23 ENCOUNTER — MYC MEDICAL ADVICE (OUTPATIENT)
Dept: FAMILY MEDICINE | Facility: CLINIC | Age: 64
End: 2019-08-23

## 2019-11-01 ENCOUNTER — OFFICE VISIT (OUTPATIENT)
Dept: FAMILY MEDICINE | Facility: CLINIC | Age: 64
End: 2019-11-01
Payer: COMMERCIAL

## 2019-11-01 VITALS
BODY MASS INDEX: 32.14 KG/M2 | WEIGHT: 224 LBS | SYSTOLIC BLOOD PRESSURE: 112 MMHG | DIASTOLIC BLOOD PRESSURE: 66 MMHG | TEMPERATURE: 98 F | OXYGEN SATURATION: 100 % | HEART RATE: 82 BPM | RESPIRATION RATE: 16 BRPM

## 2019-11-01 DIAGNOSIS — J20.9 ACUTE BRONCHITIS, UNSPECIFIED ORGANISM: ICD-10-CM

## 2019-11-01 PROCEDURE — 99213 OFFICE O/P EST LOW 20 MIN: CPT | Performed by: PHYSICIAN ASSISTANT

## 2019-11-01 RX ORDER — CODEINE PHOSPHATE AND GUAIFENESIN 10; 100 MG/5ML; MG/5ML
SOLUTION ORAL
Qty: 120 ML | Refills: 0 | Status: SHIPPED | OUTPATIENT
Start: 2019-11-01 | End: 2020-05-19

## 2019-11-01 RX ORDER — BENZONATATE 200 MG/1
200 CAPSULE ORAL 3 TIMES DAILY PRN
Qty: 21 CAPSULE | Refills: 0 | Status: SHIPPED | OUTPATIENT
Start: 2019-11-01 | End: 2020-05-19

## 2019-11-01 ASSESSMENT — ENCOUNTER SYMPTOMS
EYES NEGATIVE: 1
MUSCULOSKELETAL NEGATIVE: 1
PSYCHIATRIC NEGATIVE: 1
NEUROLOGICAL NEGATIVE: 1
ENDOCRINE NEGATIVE: 1
CARDIOVASCULAR NEGATIVE: 1
GASTROINTESTINAL NEGATIVE: 1

## 2019-11-01 NOTE — PATIENT INSTRUCTIONS
I will send the two medications discussed for cough.     Also add honey (ok to mix with hot tea) for the cough.   Stay well hydrated.

## 2019-11-01 NOTE — PROGRESS NOTES
Subjective     Werner Alvarez is a 64 year old male who presents to clinic today for the following health issues:    HPI   ENT Symptoms             Symptoms: cc Present Absent Comment   Fever/Chills  x     Fatigue  x     Muscle Aches  x     Eye Irritation   x    Sneezing   x    Nasal Lopez/Drg   x    Sinus Pressure/Pain   x    Loss of smell   x    Dental pain   x    Sore Throat   x    Swollen Glands   x    Ear Pain/Fullness   x    Cough  x     Wheeze   x    Chest Pain   x    Shortness of breath   x    Rash   x    Other   x      Symptom duration:  monday   Symptom severity:  moderate   Treatments tried:  theraflu   Contacts:  none     Slowly chest congestion with harsh cough  Hot shower helped loosen it up  Denies nasal congestion sore throat or ear pain  He cannot sleep  No shortness of breath  Possible low grade fever early in the week        Patient Active Problem List   Diagnosis     Obesity (BMI 30.0-34.9)     Obstructive sleep apnea     Preventive measure     Tobacco abuse     Erectile dysfunction, unspecified erectile dysfunction type     Pulmonary nodules     Past Surgical History:   Procedure Laterality Date     ARTHROSCOPY KNEE RT/LT Bilateral        Social History     Tobacco Use     Smoking status: Former Smoker     Packs/day: 1.00     Years: 25.00     Pack years: 25.00     Types: Cigarettes     Last attempt to quit: 2014     Years since quittin.2     Smokeless tobacco: Never Used   Substance Use Topics     Alcohol use: Yes     Alcohol/week: 0.0 standard drinks     Comment: Socially     Family History   Problem Relation Age of Onset     Emphysema Mother         still alive      Other Cancer Father 78        throat cancer         Current Outpatient Medications   Medication Sig Dispense Refill     aspirin 81 MG tablet Take 1 tablet (81 mg) by mouth daily 30 tablet      benzonatate (TESSALON) 200 MG capsule Take 1 capsule (200 mg) by mouth 3 times daily as needed for cough 21 capsule 0      EPINEPHrine (EPIPEN/ADRENACLICK/OR ANY BX GENERIC EQUIV) 0.3 MG/0.3ML injection 2-pack Inject 0.3 mLs (0.3 mg) into the muscle as needed for anaphylaxis 1 each 1     guaiFENesin-codeine (ROBITUSSIN AC) 100-10 MG/5ML solution 1-2 tsp po q 4-6hrs prn cough 120 mL 0     order for DME [id number] CPAP  13 CM H2O  WISP NASAL MASK (LARGE)       Allergies   Allergen Reactions     Penicillins Unknown     Since childhood         Reviewed and updated as needed this visit by Provider         Review of Systems   Constitutional:        As in HPI   HENT:        As in HPI   Eyes: Negative.    Respiratory:        As in HPI   Cardiovascular: Negative.    Gastrointestinal: Negative.    Endocrine: Negative.    Genitourinary: Negative.    Musculoskeletal: Negative.    Skin: Negative.    Neurological: Negative.    Psychiatric/Behavioral: Negative.          Objective    /66   Pulse 82   Temp 98  F (36.7  C) (Tympanic)   Resp 16   Wt 101.6 kg (224 lb)   SpO2 100%   BMI 32.14 kg/m    Physical Exam  Constitutional:       Appearance: He is well-developed.   HENT:      Head: Normocephalic and atraumatic.      Right Ear: Tympanic membrane and ear canal normal.      Left Ear: Tympanic membrane and ear canal normal.      Nose: Nose normal.      Right Sinus: No maxillary sinus tenderness or frontal sinus tenderness.      Left Sinus: No maxillary sinus tenderness or frontal sinus tenderness.      Mouth/Throat:      Pharynx: Uvula midline. No oropharyngeal exudate or posterior oropharyngeal erythema.   Eyes:      Conjunctiva/sclera: Conjunctivae normal.      Pupils: Pupils are equal, round, and reactive to light.   Neck:      Musculoskeletal: Normal range of motion.   Cardiovascular:      Rate and Rhythm: Normal rate and regular rhythm.      Heart sounds: Normal heart sounds.   Pulmonary:      Effort: Pulmonary effort is normal.      Breath sounds: Normal breath sounds.   Lymphadenopathy:      Cervical: No cervical adenopathy.   Skin:     " General: Skin is warm and dry.   Neurological:      Mental Status: He is alert and oriented to person, place, and time.   Psychiatric:         Judgement: Judgment normal.         Diagnostic Test Results:  No results found for this or any previous visit (from the past 24 hour(s)).        Assessment & Plan   Problem List Items Addressed This Visit     None      Visit Diagnoses     Acute bronchitis, unspecified organism        Relevant Medications    guaiFENesin-codeine (ROBITUSSIN AC) 100-10 MG/5ML solution    benzonatate (TESSALON) 200 MG capsule             BMI:   Estimated body mass index is 32.14 kg/m  as calculated from the following:    Height as of 7/10/19: 1.778 m (5' 10\").    Weight as of this encounter: 101.6 kg (224 lb).   Weight management plan: Discussed healthy diet and exercise guidelines        Patient Instructions   I will send the two medications discussed for cough.     Also add honey (ok to mix with hot tea) for the cough.   Stay well hydrated.       Return in about 2 weeks (around 11/15/2019) for a recheck if you are not improved.    Shanique Duncan PA-C  [facility]    "

## 2020-04-03 ENCOUNTER — MYC MEDICAL ADVICE (OUTPATIENT)
Dept: SLEEP MEDICINE | Facility: CLINIC | Age: 65
End: 2020-04-03

## 2020-04-03 DIAGNOSIS — G47.33 OSA (OBSTRUCTIVE SLEEP APNEA): Primary | ICD-10-CM

## 2020-05-08 ENCOUNTER — VIRTUAL VISIT (OUTPATIENT)
Dept: FAMILY MEDICINE | Facility: OTHER | Age: 65
End: 2020-05-08

## 2020-05-08 NOTE — PROGRESS NOTES
"Date: 2020 12:43:04  Clinician: Zander Rodriguez  Clinician NPI: [id number]  Patient: Werner Alvarez  Patient : 1955  Patient Address: [address] [place] [address]  Patient Phone: [phone]  Visit Protocol: URI  Patient Summary:  Werner is a 64 year old ( : 1955 ) male who initiated a Visit for COVID-19 (Coronavirus) evaluation and screening. When asked the question \"Please sign me up to receive news, health information and promotions from [employer].\", Werner responded \"Yes\".    Werner states his symptoms started gradually 3-4 days ago.   His symptoms consist of myalgia, rhinitis, facial pain or pressure, a cough, nasal congestion, nausea, and a headache.   Symptom details     Nasal secretions: The color of his mucus is clear.    Cough: Werner coughs a few times an hour and his cough is not more bothersome at night. Phlegm does not come into his throat when he coughs. He does not believe his cough is caused by post-nasal drip.     Facial pain or pressure: The facial pain or pressure feels worse when bending over or leaning forward.     Headache: He states the headache is mild (1-3 on a 10 point pain scale).      Werner denies having fever, sore throat, vomiting, teeth pain, ageusia, anosmia, diarrhea, ear pain, malaise, wheezing, and chills. He also denies taking antibiotic medication for the symptoms, double sickening (worsening symptoms after initial improvement), and having recent facial or sinus surgery in the past 60 days. He is not experiencing dyspnea.   Precipitating events  He has not recently been exposed to someone with influenza. Werner has been in close contact with the following high risk individuals: children under the age of 5.   Pertinent COVID-19 (Coronavirus) information  Werner does not work or volunteer as healthcare worker or a  and does not work or volunteer in a healthcare facility.   He does not live with a healthcare worker.   Werner has not had a close " contact with a laboratory-confirmed COVID-19 patient within 14 days of symptom onset. He also has not had a close contact with a suspected COVID-19 patient within 14 days of symptom onset.   Pertinent medical history  Werner does not need a return to work/school note.   Weight: 240 lbs   Werner does not smoke or use smokeless tobacco.   Weight: 240 lbs    MEDICATIONS: No current medications, ALLERGIES: NKDA  Clinician Response:  Dear Werner,   Dear Werner  Your symptoms show that you may have coronavirus (COVID-19). This illness can cause fever, cough and trouble breathing. Many people get a mild case and get better on their own. Some people can get very sick.   Will I be tested for COVID-19?  Because we have limited testing supplies, we do not test everyone who is at low risk. We are testing if:    You are very ill. For example, you're on chemotherapy, dialysis or home hospice care. (Contact your specialty clinic or program.)   You live in a nursing home or other long-term care facility. (Talk to your nurse manager or medical director.)   You're a health care worker. ([facility] employees contact our employee health office for testing.)   We are performing limited curbside testing for healthcare/first responders and people with medical problems that put them at increased risk. It does not appear by the [facility] information you submitted that you meet any of these criteria. If there are medical problems that we did not know about, please repeat an [facility] visit and let us know what medical conditions you have.   How can I protect others?  Without a test, we can't know for sure that you have COVID-19. For safety, it's very important to follow these rules.  First, stay home and away from others (self-isolate) until:   You've had no fever---and no medicine that reduces fever---for 3 full days (72 hours). And...    Your other symptoms have gotten better. For example, your cough or breathing has improved. And...   At least  10 days have passed since your symptoms started.   During this time:   Don't go to work, school or anywhere else.    Stay away from others in your home. No hugging, kissing or shaking hands.   Don't let anyone visit.   Cover your mouth and nose with a mask, tissue or wash cloth to avoid spreading germs.   Wash your hands and face often. Use soap and water.   How can I take care of myself?   1.Take Tylenol (acetaminophen) for fever or pain. If you have liver or kidney problems, ask your family doctor if it's okay to take Tylenol.        Adults can take either:    650 mg (two 325 mg pills) every 4 to 6 hours, or...   1,000 mg (two 500 mg pills) every 8 hours as needed.    Note: Don't take more than 3,000 mg in one day.   For children, check the Tylenol bottle for the right dose. The dose is based on the child's age or weight.   2.If you have other health problems (like cancer, heart failure, an organ transplant or severe kidney disease): Call your specialty clinic if you don't feel better in the next 2 days.       3.Know when to call 911: If your breathing is so bad that it keeps you from doing normal activities, call 911 or go to the emergency room. Tell them that you've been staying home and may have COVID-19.   Where can I get more information?  To learn more about COVID-19 and how to care for yourself at home, please visit the CDC website at https://www.cdc.gov/coronavirus/2019-ncov/about/steps-when-sick.html.  For more about your care at [facility], please visit [email or web address]   If you are interested in becoming part of a [facility] clinic trial related to COVID19 please go to [email or web address] for information, if you qualify.     Diagnosis: Acute upper respiratory infection, unspecified  Diagnosis ICD: J06.9

## 2020-05-19 VITALS — BODY MASS INDEX: 33.64 KG/M2 | WEIGHT: 235 LBS | HEIGHT: 70 IN

## 2020-05-19 ASSESSMENT — MIFFLIN-ST. JEOR: SCORE: 1867.2

## 2020-05-19 NOTE — PATIENT INSTRUCTIONS
Your BMI is Body mass index is 33.72 kg/m .  Weight management is a personal decision.  If you are interested in exploring weight loss strategies, the following discussion covers the approaches that may be successful. Body mass index (BMI) is one way to tell whether you are at a healthy weight, overweight, or obese. It measures your weight in relation to your height.  A BMI of 18.5 to 24.9 is in the healthy range. A person with a BMI of 25 to 29.9 is considered overweight, and someone with a BMI of 30 or greater is considered obese. More than two-thirds of American adults are considered overweight or obese.  Being overweight or obese increases the risk for further weight gain. Excess weight may lead to heart disease and diabetes.  Creating and following plans for healthy eating and physical activity may help you improve your health.  Weight control is part of healthy lifestyle and includes exercise, emotional health, and healthy eating habits. Careful eating habits lifelong are the mainstay of weight control. Though there are significant health benefits from weight loss, long-term weight loss with diet alone may be very difficult to achieve- studies show long-term success with dietary management in less than 10% of people. Attaining a healthy weight may be especially difficult to achieve in those with severe obesity. In some cases, medications, devices and surgical management might be considered.  What can you do?  If you are overweight or obese and are interested in methods for weight loss, you should discuss this with your provider.     Consider reducing daily calorie intake by 500 calories.     Keep a food journal.     Avoiding skipping meals, consider cutting portions instead.    Diet combined with exercise helps maintain muscle while optimizing fat loss. Strength training is particularly important for building and maintaining muscle mass. Exercise helps reduce stress, increase energy, and improves fitness.  Increasing exercise without diet control, however, may not burn enough calories to loose weight.       Start walking three days a week 10-20 minutes at a time    Work towards walking thirty minutes five days a week     Eventually, increase the speed of your walking for 1-2 minutes at time    In addition, we recommend that you review healthy lifestyles and methods for weight loss available through the National Institutes of Health patient information sites:  http://win.niddk.nih.gov/publications/index.htm    And look into health and wellness programs that may be available through your health insurance provider, employer, local community center, or kristin club.    Weight management plan: Patient was referred to their PCP to discuss a diet and exercise plan.

## 2020-05-19 NOTE — PROGRESS NOTES
"Werner Alvarez is a 64 year old male who is being evaluated via a billable telephone visit.      The patient has been notified of following:     \"This telephone visit will be conducted via a call between you and your physician/provider. We have found that certain health care needs can be provided without the need for a physical exam.  This service lets us provide the care you need with a short phone conversation.  If a prescription is necessary we can send it directly to your pharmacy.  If lab work is needed we can place an order for that and you can then stop by our lab to have the test done at a later time.    Telephone visits are billed at different rates depending on your insurance coverage. During this emergency period, for some insurers they may be billed the same as an in-person visit.  Please reach out to your insurance provider with any questions.    If during the course of the call the physician/provider feels a telephone visit is not appropriate, you will not be charged for this service.\"    Patient has given verbal consent for Telephone visit?  Yes    What phone number would you like to be contacted at? [phone]    How would you like to obtain your AVS? [facility]   Outpatient Sleep Medicine  May 20, 2020      Name: Werner Alvarez MRN# [id number]   Age: 64 year old YOB: 1955        Chief Complaint      Chief Complaint   Patient presents with     CPAP Follow Up            History of Present Illness:     Werner Alvarez is a 64 year old male who presents to the clinic via telephone for follow-up of his severe obstructive sleep apnea. Primary reason for today's visit is to renew prescription for supplies/mask. No complaints or issues today.     Originally diagnosed with GREGORY via PSG in 10/2007 with an AHI of 51/hr, supine AHI 60, and oxygen estephania 58%. Has been treated with CPAP since that time.     Overall, the patient rates their experience with PAP " "as 10  (0 poor, 10 great). States,  \"I think it saved my life, I was a wreck before it with just horrible sleep and headaches all the time and CPAP changed all of that\".      Patient is using a nasal mask. The mask is comfortable though he is interested in trying out newer mask styles since it has been a number of years since alternatives were tried. The mask is not leaking. They are not snoring with the mask on. They are not having gasp arousals.  They are not having significant oral/nasal dryness or epistaxis.  They are not having pain/skin breakdown. The pressure settings are comfortable, tolerating lower pressure well, changed from [id number] to [id number] at last visit.     Bedtime is typically between 9:30-10:00PM. Usually it takes about 2 minutes to fall asleep with the mask on. Wake time is typically between 6-7:00AM.  Patient is using PAP therapy 9+ hours per night. The patient is usually getting 9+ hours of sleep per night.    Patient feels rested in the morning, is sleeping better with less arousals, has less headaches, and improved energy level during the day when uses his CPAP.     Total score - [place]: 2 (5/19/2020 11:00 AM)    ResMed CPAP 11.0 cmH2O 30 day usage data:  100% of days with > 4 hours of use. 0/30 days with no use.   Average use 554 minutes per day.   95%ile Leak 11.57 L/min.   AHI 1.32 events per hour.     Past medical/surgical history, family history, social history, medications and allergies were reviewed.           Physical Examination:   Ht 1.778 m (5' 10\")   Wt 106.6 kg (235 lb)   BMI 33.72 kg/m    General: Very pleasant and cooperative.  In no apparent distress.  Neurologic: Alert, oriented x3.   Psychiatric: Mood euthymic. Affect congruent with full range and intensity.         Assessment and Plan:   1. GREGORY (obstructive sleep apnea)  2. Obesity (BMI 30.0-34.9)    Patient's sleep apnea is adequately managed and well controlled with current PAP setting [id number] with low residual AHI of " "1.32 events per hour. He is tolerating his PAP well at current settings. Interested in getting new machine, current machine is working but has a \"sqeaking noise\". Believes he got his current machine in 2015. A prescription was sent today for replacement machine and new mask/supplies.   - Comprehensive DME    I encouraged Werner to schedule a mask fit appointment post-COVID19 so he can try alternative mask types. He is very interested in this.       Werner Alvarez will follow up in about 1 year, or sooner as needed.     CC:  Guzman Zavala PA-C  May 20, 2020     [facility]  3rd Floor  [address] , [place], [place] [address] [place] [address] Sleep Center  [address], [place] [address]      Phone call duration: 10 minutes    "

## 2020-05-20 ENCOUNTER — VIRTUAL VISIT (OUTPATIENT)
Dept: SLEEP MEDICINE | Facility: CLINIC | Age: 65
End: 2020-05-20
Payer: COMMERCIAL

## 2020-05-20 DIAGNOSIS — E66.811 OBESITY (BMI 30.0-34.9): ICD-10-CM

## 2020-05-20 DIAGNOSIS — G47.33 OSA (OBSTRUCTIVE SLEEP APNEA): Primary | ICD-10-CM

## 2020-05-20 PROCEDURE — 99212 OFFICE O/P EST SF 10 MIN: CPT | Mod: 95 | Performed by: PHYSICIAN ASSISTANT

## 2020-08-05 ENCOUNTER — OFFICE VISIT (OUTPATIENT)
Dept: FAMILY MEDICINE | Facility: CLINIC | Age: 65
End: 2020-08-05
Payer: COMMERCIAL

## 2020-08-05 VITALS
TEMPERATURE: 97.9 F | SYSTOLIC BLOOD PRESSURE: 128 MMHG | DIASTOLIC BLOOD PRESSURE: 86 MMHG | OXYGEN SATURATION: 97 % | RESPIRATION RATE: 16 BRPM | WEIGHT: 258 LBS | BODY MASS INDEX: 36.94 KG/M2 | HEIGHT: 70 IN | HEART RATE: 85 BPM

## 2020-08-05 DIAGNOSIS — Z12.5 SCREENING FOR PROSTATE CANCER: ICD-10-CM

## 2020-08-05 DIAGNOSIS — Z00.00 ROUTINE GENERAL MEDICAL EXAMINATION AT A HEALTH CARE FACILITY: Primary | ICD-10-CM

## 2020-08-05 DIAGNOSIS — E66.01 MORBID OBESITY (H): ICD-10-CM

## 2020-08-05 DIAGNOSIS — R91.8 PULMONARY NODULES: ICD-10-CM

## 2020-08-05 DIAGNOSIS — R63.5 ABNORMAL WEIGHT GAIN: ICD-10-CM

## 2020-08-05 LAB
ALBUMIN SERPL-MCNC: 3.7 G/DL (ref 3.4–5)
ALP SERPL-CCNC: 55 U/L (ref 40–150)
ALT SERPL W P-5'-P-CCNC: 49 U/L (ref 0–70)
ANION GAP SERPL CALCULATED.3IONS-SCNC: 7 MMOL/L (ref 3–14)
AST SERPL W P-5'-P-CCNC: 26 U/L (ref 0–45)
BILIRUB SERPL-MCNC: 0.5 MG/DL (ref 0.2–1.3)
BUN SERPL-MCNC: 13 MG/DL (ref 7–30)
CALCIUM SERPL-MCNC: 8.9 MG/DL (ref 8.5–10.1)
CHLORIDE SERPL-SCNC: 106 MMOL/L (ref 94–109)
CHOLEST SERPL-MCNC: 190 MG/DL
CO2 SERPL-SCNC: 26 MMOL/L (ref 20–32)
CREAT SERPL-MCNC: 0.9 MG/DL (ref 0.66–1.25)
GFR SERPL CREATININE-BSD FRML MDRD: >90 ML/MIN/{1.73_M2}
GLUCOSE SERPL-MCNC: 104 MG/DL (ref 70–99)
HDLC SERPL-MCNC: 56 MG/DL
LDLC SERPL CALC-MCNC: 107 MG/DL
NONHDLC SERPL-MCNC: 134 MG/DL
POTASSIUM SERPL-SCNC: 4.3 MMOL/L (ref 3.4–5.3)
PROT SERPL-MCNC: 8 G/DL (ref 6.8–8.8)
PSA SERPL-ACNC: 2.18 UG/L (ref 0–4)
SODIUM SERPL-SCNC: 139 MMOL/L (ref 133–144)
TRIGL SERPL-MCNC: 134 MG/DL

## 2020-08-05 PROCEDURE — 99396 PREV VISIT EST AGE 40-64: CPT | Performed by: INTERNAL MEDICINE

## 2020-08-05 PROCEDURE — G0103 PSA SCREENING: HCPCS | Performed by: INTERNAL MEDICINE

## 2020-08-05 PROCEDURE — 80061 LIPID PANEL: CPT | Performed by: INTERNAL MEDICINE

## 2020-08-05 PROCEDURE — 80053 COMPREHEN METABOLIC PANEL: CPT | Performed by: INTERNAL MEDICINE

## 2020-08-05 PROCEDURE — 36415 COLL VENOUS BLD VENIPUNCTURE: CPT | Performed by: INTERNAL MEDICINE

## 2020-08-05 RX ORDER — MULTIPLE VITAMINS W/ MINERALS TAB 9MG-400MCG
1 TAB ORAL DAILY
COMMUNITY
End: 2022-07-18

## 2020-08-05 ASSESSMENT — MIFFLIN-ST. JEOR: SCORE: 1966.53

## 2020-08-05 NOTE — PROGRESS NOTES
3  SUBJECTIVE:   CC: Werner Alvarez is an 65 year old male who presents for preventive health visit.     Healthy Habits:    Do you get at least three servings of calcium containing foods daily (dairy, green leafy vegetables, etc.)? yes    Amount of exercise or daily activities, outside of work: no    Problems taking medications regularly No    Medication side effects: No    Have you had an eye exam in the past two years? yes    Do you see a dentist twice per year? yes    Do you have sleep apnea, excessive snoring or daytime drowsiness?sleep apnea, wears CPAP         Has gained 34 lbs in the past yr.                     Step dtr and grand dtr are moving out soon; his diet will improve.              He plans on losing weight.               He is requesting a follow-up lung scan.           He otherwise feels healthy.  Today's PHQ-2 Score:   PHQ-2 (  [employer]) 7/10/2019 2018   Q1: Little interest or pleasure in doing things 0 0   Q2: Feeling down, depressed or hopeless 0 0   PHQ-2 Score 0 0   Q1: Little interest or pleasure in doing things Not at all Not at all   Q2: Feeling down, depressed or hopeless Not at all Not at all   PHQ-2 Score 0 0       Abuse: Current or Past(Physical, Sexual or Emotional)- No  Do you feel safe in your environment? Yes    Have you ever done Advance Care Planning? (For example, a Health Directive, POLST, or a discussion with a medical provider or your loved ones about your wishes): yes    Social History     Tobacco Use     Smoking status: Former Smoker     Packs/day: 1.00     Years: 25.00     Pack years: 25.00     Types: Cigarettes     Last attempt to quit: 2014     Years since quittin.0     Smokeless tobacco: Never Used   Substance Use Topics     Alcohol use: Yes     Alcohol/week: 0.0 standard drinks     Comment: Socially     If you drink alcohol do you typically have >3 drinks per day or >7 drinks per week? no                      Last PSA:   PSA   Date Value Ref Range  "Status   07/10/2019 2.51 0 - 4 ug/L Final     Comment:     Assay Method:  Chemiluminescence using Siemens Vista analyzer       Reviewed orders with patient. Reviewed health maintenance and updated orders accordingly - Yes  Patient Active Problem List    Diagnosis Date Noted     Pulmonary nodules 2018     Priority: High      2 tiny nodules in ;no change in  and \"considered benign\"  ; stable in               Tobacco abuse 2016     Priority: High     Off and on for many years; quit .      approx 20 pack yrs       Erectile dysfunction, unspecified erectile dysfunction type 2016     Priority: Medium     Obesity (BMI 30.0-34.9) 2015     Priority: Medium     Obstructive sleep apnea 2015     Priority: Medium     Uses CPAP       Preventive measure 2016     Priority: Low     Hx of yearly CPE; not yearly PSA; 2.66 in ;  2.86 in  ; 2.22 in   ,2.51 in                 Colonoscopy neg in ; recheck in 10 yrs.          Past Surgical History:   Procedure Laterality Date     ARTHROSCOPY KNEE RT/LT Bilateral      Social History     Socioeconomic History     Marital status:      Spouse name: Not on file     Number of children: 3     Years of education: Not on file     Highest education level: Not on file   Occupational History     Not on file   Social Needs     Financial resource strain: Not on file     Food insecurity     Worry: Not on file     Inability: Not on file     Transportation needs     Medical: Not on file     Non-medical: Not on file   Tobacco Use     Smoking status: Former Smoker     Packs/day: 1.00     Years: 25.00     Pack years: 25.00     Types: Cigarettes     Last attempt to quit: 2014     Years since quittin.0     Smokeless tobacco: Never Used   Substance and Sexual Activity     Alcohol use: Yes     Alcohol/week: 0.0 standard drinks     Comment: Socially     Drug use: No     Sexual activity: Yes   Lifestyle     Physical activity     " Days per week: Not on file     Minutes per session: Not on file     Stress: Not on file   Relationships     Social connections     Talks on phone: Not on file     Gets together: Not on file     Attends Religion service: Not on file     Active member of club or organization: Not on file     Attends meetings of clubs or organizations: Not on file     Relationship status: Not on file     Intimate partner violence     Fear of current or ex partner: Not on file     Emotionally abused: Not on file     Physically abused: Not on file     Forced sexual activity: Not on file   Other Topics Concern     Parent/sibling w/ CABG, MI or angioplasty before 65F 55M? Not Asked   Social History Narrative    10 grandchildren                  In sales of cleaning equipment.           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                          Lived in [facility]; then [facility]; then      Immunization History   Administered Date(s) Administered     Influenza Quad, Recombinant, p-free (RIV4) 11/06/2018, 10/18/2019     Tdap (Adacel,Boostrix) 01/01/2011     Zoster vaccine recombinant adjuvanted (SHINGRIX) 11/06/2018, 02/06/2019       BP Readings from Last 3 Encounters:   08/05/20 128/86   11/01/19 112/66   07/21/19 109/71    Wt Readings from Last 3 Encounters:   08/05/20 117 kg (258 lb)   05/19/20 106.6 kg (235 lb)   11/01/19 101.6 kg (224 lb)                  Current Outpatient Medications   Medication Sig Dispense Refill     aspirin 81 MG tablet Take 1 tablet (81 mg) by mouth daily 30 tablet      EPINEPHrine (EPIPEN/ADRENACLICK/OR ANY BX GENERIC EQUIV) 0.3 MG/0.3ML injection 2-pack Inject 0.3 mLs (0.3 mg) into the muscle as needed for anaphylaxis 1 each 1     multivitamin w/minerals (MULTI-VITAMIN) tablet Take 1 tablet by mouth daily       order for DME [id number] CPAP  13 CM H2O  WISP NASAL MASK (LARGE)       Allergies   Allergen Reactions     Penicillins Unknown     Since childhood       Reviewed and updated  "as needed this visit by clinical staff  Tobacco  Allergies  Meds  Med Hx  Surg Hx  Fam Hx  Soc Hx        Reviewed and updated as needed this visit by Provider            ROS:  CONSTITUTIONAL:NEGATIVE  for chills and fever   INTEGUMENTARY/SKIN: NEGATIVE for worrisome rashes, moles or lesions  EYES: NEGATIVE for vision changes or irritation  ENT: NEGATIVE for ear, mouth and throat problems  RESP: NEGATIVE for significant cough or SOB  CV: NEGATIVE for chest pain, palpitations or peripheral edema  GI: NEGATIVE for nausea, abdominal pain, heartburn, or change in bowel habits   male: negative for dysuria, hematuria, decreased urinary stream, erectile dysfunction, urethral discharge  MUSCULOSKELETAL: NEGATIVE for significant arthralgias or myalgia  NEURO: NEGATIVE for weakness, dizziness or paresthesias  PSYCHIATRIC: NEGATIVE for changes in mood or affect    OBJECTIVE:   /86   Pulse 85   Temp 97.9  F (36.6  C) (Tympanic)   Resp 16   Ht 1.778 m (5' 10\")   Wt 117 kg (258 lb)   SpO2 97%   BMI 37.02 kg/m    EXAM:  GENERAL APPEARANCE: alert, no distress and over weight  RESP: lungs clear to auscultation - no rales, rhonchi or wheezes  CV: regular rates and rhythm, normal S1 S2, no S3 or S4 and no murmur, click or rub   (male): Normal prostate  PSYCH: mentation appears normal and affect normal/bright    Diagnostic Test Results:  Pending    ASSESSMENT/PLAN:   Werner was seen today for physical.    Diagnoses and all orders for this visit:    Routine general medical examination at a health care facility  -     Comprehensive metabolic panel (BMP + Alb, Alk Phos, ALT, AST, Total. Bili, TP)  -     Lipid Profile (Chol, Trig, HDL, LDL calc)    Pulmonary nodules  -     RADIOLOGY REFERRAL    Morbid obesity (H)  -     Comprehensive metabolic panel (BMP + Alb, Alk Phos, ALT, AST, Total. Bili, TP)  -     Lipid Profile (Chol, Trig, HDL, LDL calc)    Abnormal weight gain  -     Comprehensive metabolic panel (BMP + Alb, " "Alk Phos, ALT, AST, Total. Bili, TP)  -     Lipid Profile (Chol, Trig, HDL, LDL calc)    Screening for prostate cancer  -     PSA, screen     Summary and implications:             We decided to check lipids and glucose in view of his significant weight gain.                Lung scan ordered.  Patient Instructions    Call [phone] to set up your lung scan.                 I will let you know your lab results.    You are planning to work on weight loss.        Return in about 1 year (around 8/5/2021) for yearly wellness visit, labs will be needed.      COUNSELING:  Reviewed preventive health counseling, as reflected in patient instructions  Special attention given to:        Regular exercise       Healthy diet/nutrition    Estimated body mass index is 33.72 kg/m  as calculated from the following:    Height as of 5/19/20: 1.778 m (5' 10\").    Weight as of 5/19/20: 106.6 kg (235 lb).    Weight management plan: Discussed healthy diet and exercise guidelines     reports that he quit smoking about 6 years ago. His smoking use included cigarettes. He has a 25.00 pack-year smoking history. He has never used smokeless tobacco.      Counseling Resources:  ATP IV Guidelines  Pooled Cohorts Equation Calculator  FRAX Risk Assessment  ICSI Preventive Guidelines  Dietary Guidelines for Americans, 2010  [employer]'s MyPlate  ASA Prophylaxis  Lung CA Screening    Guzman Zavala MD  [facility]   Results for orders placed or performed in visit on 08/05/20   PSA, screen     Status: None   Result Value Ref Range    PSA 2.18 0 - 4 ug/L   Comprehensive metabolic panel (BMP + Alb, Alk Phos, ALT, AST, Total. Bili, TP)     Status: Abnormal   Result Value Ref Range    Sodium 139 133 - 144 mmol/L    Potassium 4.3 3.4 - 5.3 mmol/L    Chloride 106 94 - 109 mmol/L    Carbon Dioxide 26 20 - 32 mmol/L    Anion Gap 7 3 - 14 mmol/L    Glucose 104 (H) 70 - 99 mg/dL    Urea Nitrogen 13 7 - 30 mg/dL    Creatinine 0.90 0.66 - " 1.25 mg/dL    GFR Estimate >90 >60 mL/min/[1.73_m2]    GFR Estimate If Black >90 >60 mL/min/[1.73_m2]    Calcium 8.9 8.5 - 10.1 mg/dL    Bilirubin Total 0.5 0.2 - 1.3 mg/dL    Albumin 3.7 3.4 - 5.0 g/dL    Protein Total 8.0 6.8 - 8.8 g/dL    Alkaline Phosphatase 55 40 - 150 U/L    ALT 49 0 - 70 U/L    AST 26 0 - 45 U/L   Lipid Profile (Chol, Trig, HDL, LDL calc)     Status: Abnormal   Result Value Ref Range    Cholesterol 190 <200 mg/dL    Triglycerides 134 <150 mg/dL    HDL Cholesterol 56 >39 mg/dL    LDL Cholesterol Calculated 107 (H) <100 mg/dL    Non HDL Cholesterol 134 (H) <130 mg/dL     My chart message sent.                        Most of your lab results are good,including the kidney,liver,PSA,and the cholesterol numbers.        Your blood sugar (glucose) is very slightly elevated at 104.    You are planning to work on restricting carbohydrates ( starches, sweets, etc).

## 2020-08-05 NOTE — PATIENT INSTRUCTIONS
Call [phone] to set up your lung scan.                 I will let you know your lab results.    You are planning to work on weight loss.      
at home:

## 2020-08-10 ENCOUNTER — TELEPHONE (OUTPATIENT)
Dept: SLEEP MEDICINE | Facility: CLINIC | Age: 65
End: 2020-08-10

## 2020-08-10 NOTE — TELEPHONE ENCOUNTER
RETURNED PT CALL AND THE PT STATED THAT HIS CPAP MACHINE IS MAKING A LOUD NOISE WHEN USING. PT IS WANTING TO SEND IN HIS CPAP MACHINE FOR REPAIRS AND RECEIVE A LOANER IN THE MEANTIME. PT IS SCHEDULED AT THE [place] [facility] 08/11/2020 11AM WITH ANNEMARIE LOVE. THE PT HAS BEEN SCREENED AND ADDRESS CONFIRMED WITH PT. E-MAIL ANNEMARIE PINEDO APPOINTMENT DETAILS.

## 2020-08-18 ENCOUNTER — TRANSFERRED RECORDS (OUTPATIENT)
Dept: HEALTH INFORMATION MANAGEMENT | Facility: CLINIC | Age: 65
End: 2020-08-18

## 2020-08-24 PROBLEM — K76.0 FATTY INFILTRATION OF LIVER: Status: ACTIVE | Noted: 2020-08-24

## 2021-05-06 VITALS — WEIGHT: 240 LBS | BODY MASS INDEX: 34.36 KG/M2 | HEIGHT: 70 IN

## 2021-05-06 ASSESSMENT — MIFFLIN-ST. JEOR: SCORE: 1884.88

## 2021-05-06 NOTE — PROGRESS NOTES
"Werner Alvarez is a 65 year old male being evaluated via a billable telephone visit.     \"This telephone visit will be conducted via a call between you and your physician/provider. We have found that certain health care needs can be provided without the need for an in-person visit or physical exam.  This service lets us provide the care you need with a telephone conversation.  If a prescription is necessary we can send it directly to your pharmacy.  If lab work is needed we can place an order for that and you can then stop by our lab to have the test done at a later time.\"    Telephone visits are billed at different rates depending on your insurance coverage.  Please reach out to your insurance provider with any questions.    Patient has given verbal consent for  a Telephone visit? Yes    What telephone number would you like your provider to contact at at:  [phone]    How would you like to obtain your AVS? Devan Israel MA    Telephone Visit Details:     Telephone Visit Start Time: 9:03AM    Telephone Visit End Time:9:14 AM           [facility]   Outpatient Sleep Medicine Follow-up Visit  May 7, 2021    Name: Werner Alvarez MRN# [id number]   Age: 65 year old YOB: 1955     Date of Consultation: May 7, 2021  Consultation is requested by: No referring provider defined for this encounter.  Primary care provider: Guzman Zavala           Assessment and Plan:     Sleep Diagnoses:    Severe obstructive sleep apnea on effective therapy with good adherence    Comorbid conditions:    Obesity BMI 34    Summary Recommendations:    RTC 1 yr or earlier if you have snoring, sleep disruption or daytime sleepiness    DME order placed             Problem list:     Patient Active Problem List   Diagnosis     Obesity (BMI 30.0-34.9)     Obstructive sleep apnea     Preventive measure     Tobacco abuse     Erectile dysfunction, unspecified erectile dysfunction type     Pulmonary " nodules     Morbid obesity (H)     Abnormal weight gain     Fatty infiltration of liver             History of Present Illness:     Werner Alvarez is a 65 year old male with a history of severe sleep apnea with AHI of 51 currently effectively treated with fixed CPAP pressure 11 with 8.8 hours of use daily and AHI of less than 2 without residual sleepiness or sleep disruption      PREVIOUS SLEEP STUDIES:  Date: 2007         Most Recent SCALES:    EPWORTH SLEEPINESS SCALE WITHIN 1 YEAR WITHIN 10 DAYS   Sitting and reading 0 0   Watching TV 0 0   Sitting, inactive in a public place (theatre or mtg.) 0  0    As a passenger in a car 0 0   Lying down to rest in the afternoon when circumstance permit 1 1   Sitting and talking to someone 0 0   Sitting quietly after lunch without alcohol 0 0   In a car, while stopped for a few minutes in traffic 0 0   TOTAL SCORE 1 1       INSOMNIA SEVERITY INDEX WITHIN 1 YEAR   Difficulty falling asleep 0   Difficult staying asleep 0   Problems waking up to early 0   How SATISFIED/DISSATISFIED are you with your CURRENT sleep pattern? 1   How NOTICEABLE to others do you think your sleep pattern is in terms of your quality of life? 0   How WORRIED/DISTRESSED are you about your current sleep pattern? 0   To what extent do you consider your sleep problem to INTERFERE with your daily fuctioning(e.g. daytime fatigue, mood, ability to function at work/daily chores, concentration, mood,etc.) CURRENTLY? 0   INSOMNIA SEVERITY INDEX TOTAL SCORE 1    --absence of insomnia (0-7); sub-threshold insomnia (8-14); moderate insomnia (15-21); and severe insomnia (22-28)--          Objective:  CPAP Compliance Targets:   >70% days > 4 hours AHI < 5   30 days ending May 7, 2021  Mask type:  Nasal Mask   ResMed   CPAP 11.0 cmH2O 30 day usage data:  100% of days with > 4 hours of use. 0/30 days with no use.   Average use 528 minutes per day.   95%ile Leak 12.04 L/min.   AHI 1.13 events per hour.                  Medications:     Current Outpatient Medications   Medication Sig     aspirin 81 MG tablet Take 1 tablet (81 mg) by mouth daily     EPINEPHrine (EPIPEN/ADRENACLICK/OR ANY BX GENERIC EQUIV) 0.3 MG/0.3ML injection 2-pack Inject 0.3 mLs (0.3 mg) into the muscle as needed for anaphylaxis     multivitamin w/minerals (MULTI-VITAMIN) tablet Take 1 tablet by mouth daily     order for DME [id number] CPAP  13 CM H2O  WISP NASAL MASK (LARGE)     No current facility-administered medications for this visit.         Allergies   Allergen Reactions     Penicillins Unknown     Since childhood            Problem List:     Patient Active Problem List   Diagnosis     Obesity (BMI 30.0-34.9)     Obstructive sleep apnea     Preventive measure     Tobacco abuse     Erectile dysfunction, unspecified erectile dysfunction type     Pulmonary nodules     Morbid obesity (H)     Abnormal weight gain     Fatty infiltration of liver            Past Medical History:     Does not need 02 supplement at night   No past medical history on file.          Past Surgical History:    No h/o  upper airway surgery  Past Surgical History:   Procedure Laterality Date     ARTHROSCOPY KNEE RT/LT Bilateral               Physical Examination:     Reported vitals:  There were no vitals taken for this visit.   GENERAL: Healthy, alert and no distress  PSYCH: Mentation appears normal, affect normal/bright, judgement and insight intact, normal speech and appearance well-groomed.        Copy to: Guzman Zavala MD 5/7/2021       Total time spent with patient: 11 min >50% counseling and 5 minutes documenting and reviewing records

## 2021-05-07 ENCOUNTER — VIRTUAL VISIT (OUTPATIENT)
Dept: SLEEP MEDICINE | Facility: CLINIC | Age: 66
End: 2021-05-07
Payer: COMMERCIAL

## 2021-05-07 DIAGNOSIS — A92.8: Primary | ICD-10-CM

## 2021-05-07 DIAGNOSIS — G47.33 OSA (OBSTRUCTIVE SLEEP APNEA): ICD-10-CM

## 2021-05-07 PROCEDURE — 99212 OFFICE O/P EST SF 10 MIN: CPT | Mod: 95 | Performed by: INTERNAL MEDICINE

## 2021-05-20 ENCOUNTER — MYC MEDICAL ADVICE (OUTPATIENT)
Dept: INTERNAL MEDICINE | Facility: CLINIC | Age: 66
End: 2021-05-20

## 2021-05-20 DIAGNOSIS — T63.441A BEE STING REACTION, ACCIDENTAL OR UNINTENTIONAL, INITIAL ENCOUNTER: ICD-10-CM

## 2021-05-20 RX ORDER — EPINEPHRINE 0.3 MG/.3ML
0.3 INJECTION SUBCUTANEOUS PRN
Qty: 1 EACH | Refills: 1 | Status: SHIPPED | OUTPATIENT
Start: 2021-05-20 | End: 2022-08-15

## 2021-08-10 NOTE — PATIENT INSTRUCTIONS
Call [phone] to set up the CT of your lungs, and the ultrasound of your abdominal aorta.                       I will let you know your lab results.                       Please note that I am planning to retire on December 31, 2021.              You are welcome to continue your care through this clinic.              For a variety of reasons I will not refer you to a specific provider.

## 2021-08-11 ENCOUNTER — OFFICE VISIT (OUTPATIENT)
Dept: INTERNAL MEDICINE | Facility: CLINIC | Age: 66
End: 2021-08-11
Payer: COMMERCIAL

## 2021-08-11 VITALS
WEIGHT: 244 LBS | SYSTOLIC BLOOD PRESSURE: 120 MMHG | HEART RATE: 82 BPM | OXYGEN SATURATION: 100 % | BODY MASS INDEX: 35.01 KG/M2 | DIASTOLIC BLOOD PRESSURE: 74 MMHG

## 2021-08-11 DIAGNOSIS — K76.0 FATTY INFILTRATION OF LIVER: ICD-10-CM

## 2021-08-11 DIAGNOSIS — R73.01 IFG (IMPAIRED FASTING GLUCOSE): ICD-10-CM

## 2021-08-11 DIAGNOSIS — R91.8 PULMONARY NODULES: ICD-10-CM

## 2021-08-11 DIAGNOSIS — Z23 NEED FOR VACCINATION AGAINST STREPTOCOCCUS PNEUMONIAE: ICD-10-CM

## 2021-08-11 DIAGNOSIS — E66.01 MORBID OBESITY (H): ICD-10-CM

## 2021-08-11 DIAGNOSIS — Z00.00 ENCOUNTER FOR ANNUAL WELLNESS EXAM IN MEDICARE PATIENT: Primary | ICD-10-CM

## 2021-08-11 DIAGNOSIS — Z12.5 SCREENING FOR PROSTATE CANCER: ICD-10-CM

## 2021-08-11 DIAGNOSIS — Z87.891 HISTORY OF SMOKING: ICD-10-CM

## 2021-08-11 DIAGNOSIS — G47.33 OBSTRUCTIVE SLEEP APNEA: ICD-10-CM

## 2021-08-11 DIAGNOSIS — Z13.6 SCREENING FOR AAA (ABDOMINAL AORTIC ANEURYSM): ICD-10-CM

## 2021-08-11 LAB
ALBUMIN SERPL-MCNC: 3.7 G/DL (ref 3.4–5)
ALP SERPL-CCNC: 54 U/L (ref 40–150)
ALT SERPL W P-5'-P-CCNC: 41 U/L (ref 0–70)
ANION GAP SERPL CALCULATED.3IONS-SCNC: 6 MMOL/L (ref 3–14)
AST SERPL W P-5'-P-CCNC: 20 U/L (ref 0–45)
BILIRUB SERPL-MCNC: 0.5 MG/DL (ref 0.2–1.3)
BUN SERPL-MCNC: 13 MG/DL (ref 7–30)
CALCIUM SERPL-MCNC: 9.5 MG/DL (ref 8.5–10.1)
CHLORIDE BLD-SCNC: 106 MMOL/L (ref 94–109)
CHOLEST SERPL-MCNC: 192 MG/DL
CO2 SERPL-SCNC: 25 MMOL/L (ref 20–32)
CREAT SERPL-MCNC: 0.9 MG/DL (ref 0.66–1.25)
FASTING STATUS PATIENT QL REPORTED: YES
GFR SERPL CREATININE-BSD FRML MDRD: 89 ML/MIN/1.73M2
GLUCOSE BLD-MCNC: 94 MG/DL (ref 70–99)
HBA1C MFR BLD: 5.5 % (ref 0–5.6)
HDLC SERPL-MCNC: 64 MG/DL
LDLC SERPL CALC-MCNC: 98 MG/DL
NONHDLC SERPL-MCNC: 128 MG/DL
POTASSIUM BLD-SCNC: 4.3 MMOL/L (ref 3.4–5.3)
PROT SERPL-MCNC: 7.6 G/DL (ref 6.8–8.8)
PSA SERPL-MCNC: 2.44 UG/L (ref 0–4)
SODIUM SERPL-SCNC: 137 MMOL/L (ref 133–144)
TRIGL SERPL-MCNC: 151 MG/DL

## 2021-08-11 PROCEDURE — 99214 OFFICE O/P EST MOD 30 MIN: CPT | Mod: 25 | Performed by: INTERNAL MEDICINE

## 2021-08-11 PROCEDURE — G0402 INITIAL PREVENTIVE EXAM: HCPCS | Performed by: INTERNAL MEDICINE

## 2021-08-11 PROCEDURE — 80061 LIPID PANEL: CPT | Performed by: INTERNAL MEDICINE

## 2021-08-11 PROCEDURE — G0103 PSA SCREENING: HCPCS | Performed by: INTERNAL MEDICINE

## 2021-08-11 PROCEDURE — 80053 COMPREHEN METABOLIC PANEL: CPT | Performed by: INTERNAL MEDICINE

## 2021-08-11 PROCEDURE — 36415 COLL VENOUS BLD VENIPUNCTURE: CPT | Performed by: INTERNAL MEDICINE

## 2021-08-11 PROCEDURE — 90732 PPSV23 VACC 2 YRS+ SUBQ/IM: CPT | Performed by: INTERNAL MEDICINE

## 2021-08-11 PROCEDURE — G0009 ADMIN PNEUMOCOCCAL VACCINE: HCPCS | Performed by: INTERNAL MEDICINE

## 2021-08-11 PROCEDURE — 83036 HEMOGLOBIN GLYCOSYLATED A1C: CPT | Performed by: INTERNAL MEDICINE

## 2021-08-11 ASSESSMENT — ENCOUNTER SYMPTOMS
DIARRHEA: 0
HEADACHES: 0
FEVER: 0
CONSTIPATION: 0
COUGH: 0
NAUSEA: 0
SHORTNESS OF BREATH: 0
PARESTHESIAS: 0
ARTHRALGIAS: 0
HEMATOCHEZIA: 0
SORE THROAT: 0
HEARTBURN: 0
FREQUENCY: 0
NERVOUS/ANXIOUS: 0
WEAKNESS: 0
PALPITATIONS: 0
HEMATURIA: 0
EYE PAIN: 0
DIZZINESS: 0
ABDOMINAL PAIN: 0
CHILLS: 0
MYALGIAS: 0
DYSURIA: 0

## 2021-08-11 ASSESSMENT — ACTIVITIES OF DAILY LIVING (ADL): CURRENT_FUNCTION: NO ASSISTANCE NEEDED

## 2021-08-11 NOTE — PROGRESS NOTES
"SUBJECTIVE:   Werner Alvarez is a 66 year old male who presents for Preventive Visit.      Patient has been advised of split billing requirements and indicates understanding: Yes   Are you in the first 12 months of your Medicare coverage?  Yes,  Visual Acuity:  Right Eye: 20/25   Left Eye: 20/20  Both Eyes: 20/20    Healthy Habits:     In general, how would you rate your overall health?  Good    Frequency of exercise:  1 day/week    Duration of exercise:  15-30 minutes    Do you usually eat at least 4 servings of fruit and vegetables a day, include whole grains    & fiber and avoid regularly eating high fat or \"junk\" foods?  No    Taking medications regularly:  Yes    Medication side effects:  Not applicable    Ability to successfully perform activities of daily living:  No assistance needed    Home Safety:  No safety concerns identified    Hearing Impairment:  No hearing concerns    In the past 6 months, have you been bothered by leaking of urine?  No    In general, how would you rate your overall mental or emotional health?  Good      PHQ-2 Total Score: 0    Additional concerns today:  No    Do you feel safe in your environment? Yes    Have you ever done Advance Care Planning? (For example, a Health Directive, POLST, or a discussion with a medical provider or your loved ones about your wishes): Yes, patient states has an Advance Care Planning document and will bring a copy to the clinic.       Fall risk  Fallen 2 or more times in the past year?: No  Any fall with injury in the past year?: No    Cognitive Screening   1) Repeat 3 items (Leader, Season, Table)    2) Clock draw: NORMAL  3) 3 item recall: Recalls 2 objects   Results: NORMAL clock, 1-2 items recalled: COGNITIVE IMPAIRMENT LESS LIKELY    Mini-CogTM Copyright DI Yuan. Licensed by the author for use in [employer] [facility]; reprinted with permission (kyle@.[facility]). All rights reserved.      Do you have sleep apnea, excessive snoring or daytime " drowsiness?: no    Reviewed and updated as needed this visit by clinical staff  Tobacco  Allergies  Meds              Reviewed and updated as needed this visit by Provider                Social History     Tobacco Use     Smoking status: Former Smoker     Packs/day: 1.00     Years: 25.00     Pack years: 25.00     Types: Cigarettes     Quit date: 2014     Years since quittin.0     Smokeless tobacco: Never Used   Substance Use Topics     Alcohol use: Yes     Alcohol/week: 0.0 standard drinks     Comment: Socially     If you drink alcohol do you typically have >3 drinks per day or >7 drinks per week? No    Alcohol Use 2021   Prescreen: >3 drinks/day or >7 drinks/week? No   Prescreen: >3 drinks/day or >7 drinks/week? -           This man states that he feels healthy.                             He is physically active with yard work etc., but does not do regular walking or other aerobic activities.                    His weight is up 4 pounds since last year, but still down 14 pounds from 2 years ago.                              Current providers sharing in care for this patient include:   Patient Care Team:  Guzman Zavala MD as PCP - General (Internal Medicine)  Guzman Zavala MD as Assigned PCP  Richi Rogers MD as Assigned Sleep Provider    The following health maintenance items are reviewed in Epic and correct as of today:  Health Maintenance Due   Topic Date Due     ANNUAL REVIEW OF  ORDERS  Never done     HIV SCREENING  Never done     DTAP/TDAP/TD IMMUNIZATION (2 - Td or Tdap) 2021     FALL RISK ASSESSMENT  2021     AORTIC ANEURYSM SCREENING (SYSTEM ASSIGNED)  Never done     Pneumococcal Vaccine: 65+ Years (1 of 1 - PPSV23) 2021     Patient Active Problem List    Diagnosis Date Noted     Fatty infiltration of liver 2020     Priority: High     Noted on chest CT        Pulmonary nodules 2018     Priority: High      2 tiny nodules in ;no change in   "and \"considered benign\"  ; stable in  and in               History of smoking 2016     Priority: High     Off and on for many years; quit .      approx 20 pack yrs       Morbid obesity (H) 2020     Priority: Medium     Abnormal weight gain 2020     Priority: Medium     Erectile dysfunction, unspecified erectile dysfunction type 2016     Priority: Medium     Obesity (BMI 30.0-34.9) 2015     Priority: Medium     Obstructive sleep apnea 2015     Priority: Medium     Uses CPAP       Preventive measure 2016     Priority: Low     Hx of yearly CPE; not yearly PSA; 2.66 in ;  2.86 in  ; 2.22 in   ,2.51 in                 Colonoscopy neg in ; recheck in 10 yrs.          Past Surgical History:   Procedure Laterality Date     ARTHROSCOPY KNEE RT/LT Bilateral      Social History     Socioeconomic History     Marital status:      Spouse name: Not on file     Number of children: 3     Years of education: Not on file     Highest education level: Not on file   Occupational History     Not on file   Tobacco Use     Smoking status: Former Smoker     Packs/day: 1.00     Years: 25.00     Pack years: 25.00     Types: Cigarettes     Quit date: 2014     Years since quittin.0     Smokeless tobacco: Never Used   Substance and Sexual Activity     Alcohol use: Yes     Alcohol/week: 0.0 standard drinks     Comment: Socially     Drug use: No     Sexual activity: Yes   Other Topics Concern     Parent/sibling w/ CABG, MI or angioplasty before 65F 55M? Not Asked   Social History Narrative    10 grandchildren                  In sales of cleaning equipment.           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                          Lived in [facility]; then [facility]; then [place]                             Father had throat cancer.      Social Determinants of Health     Financial Resource Strain:      Difficulty of Paying Living Expenses:  "   Food Insecurity:      Worried About Running Out of Food in the Last Year:      Ran Out of Food in the Last Year:    Transportation Needs:      Lack of Transportation (Medical):      Lack of Transportation (Non-Medical):    Physical Activity:      Days of Exercise per Week:      Minutes of Exercise per Session:    Stress:      Feeling of Stress :    Social Connections:      Frequency of Communication with Friends and Family:      Frequency of Social Gatherings with Friends and Family:      Attends [employer] Services:      Active Member of Clubs or Organizations:      Attends Club or Organization Meetings:      Marital Status:    Intimate Partner Violence:      Fear of Current or Ex-Partner:      Emotionally Abused:      Physically Abused:      Sexually Abused:      Immunization History   Administered Date(s) Administered     COVID-19,PF,Pfizer 03/11/2021, 04/08/2021     Influenza Quad, Recombinant, p-free (RIV4) 11/06/2018, 10/18/2019, 10/26/2020     Tdap (Adacel,Boostrix) 01/01/2011     Zoster vaccine recombinant adjuvanted (SHINGRIX) 11/06/2018, 02/06/2019       BP Readings from Last 3 Encounters:   08/11/21 120/74   08/05/20 128/86   11/01/19 112/66    Wt Readings from Last 3 Encounters:   08/11/21 110.7 kg (244 lb)   05/06/21 108.9 kg (240 lb)   08/05/20 117 kg (258 lb)                  Current Outpatient Medications   Medication Sig Dispense Refill     aspirin 81 MG tablet Take 1 tablet (81 mg) by mouth daily 30 tablet      EPINEPHrine (ANY BX GENERIC EQUIV) 0.3 MG/0.3ML injection 2-pack Inject 0.3 mLs (0.3 mg) into the muscle as needed for anaphylaxis 1 each 1     multivitamin w/minerals (MULTI-VITAMIN) tablet Take 1 tablet by mouth daily       order for DME [id number] CPAP  13 CM H2O  WISP NASAL MASK (LARGE)       Allergies   Allergen Reactions     Pcn [Penicillins] Unknown     Since childhood             Review of Systems  RESP:NEGATIVE for significant cough or SOB  CV: NEGATIVE for chest pain, palpitations  "or peripheral edema  GI: NEGATIVE for nausea, abdominal pain, heartburn, or change in bowel habits    OBJECTIVE:   /74   Pulse 82   Wt 110.7 kg (244 lb)   SpO2 100%   BMI 35.01 kg/m   Estimated body mass index is 35.01 kg/m  as calculated from the following:    Height as of 5/6/21: 1.778 m (5' 10\").    Weight as of this encounter: 110.7 kg (244 lb).  Physical Exam  GENERAL APPEARANCE: alert, no distress and over weight  HENT: ear canals and TM's normal  NECK: no adenopathy, no asymmetry, masses, or scars and thyroid normal to palpation  RESP: lungs clear to auscultation - no rales, rhonchi or wheezes  CV: regular rates and rhythm, normal S1 S2, no S3 or S4 and no murmur, click or rub  ABDOMEN: soft, nontender, without hepatosplenomegaly or masses  PSYCH: mentation appears normal and affect normal/bright    Diagnostic Test Results:  Labs are pending    ASSESSMENT / PLAN:   Werner was seen today for physical.    Diagnoses and all orders for this visit:    Encounter for annual wellness exam in Medicare patient    Pulmonary nodules  -     Radiology Referral; Future    IFG (impaired fasting glucose)  -     Comprehensive metabolic panel (BMP + Alb, Alk Phos, ALT, AST, Total. Bili, TP); Future  -     Lipid Profile (Chol, Trig, HDL, LDL calc); Future  -     Hemoglobin A1c; Future    Fatty infiltration of liver  -     Comprehensive metabolic panel (BMP + Alb, Alk Phos, ALT, AST, Total. Bili, TP); Future    Obstructive sleep apnea    Morbid obesity (H)    Screening for prostate cancer  -     PSA, screen; Future    Screening for AAA (abdominal aortic aneurysm)  -     Radiology Referral; Future    History of smoking  -     Radiology Referral; Future    Need for vaccination against Streptococcus pneumoniae  -     PPSV23, IM/SUBQ (2+ YRS) - [id number]                  Summary and implications:  We reviewed multiple issues.           We reviewed all of the issues on the diagnoses list.                        We discussed " "having a follow-up chest CT, and now that he is age 66, a screening ultrasound of his abdominal aorta.              Patient Instructions      Call [phone] to set up the CT of your lungs, and the ultrasound of your abdominal aorta.                       I will let you know your lab results.                       Please note that I am planning to retire on December 31, 2021.              You are welcome to continue your care through this clinic.              For a variety of reasons I will not refer you to a specific provider.                          Return in about 1 year (around 8/11/2022) for yearly wellness visit, follow up of several issues.      Patient has been advised of split billing requirements and indicates understanding: Yes  COUNSELING:  Reviewed preventive health counseling, as reflected in patient instructions  Special attention given to:       Regular exercise       Healthy diet/nutrition    Estimated body mass index is 35.01 kg/m  as calculated from the following:    Height as of 5/6/21: 1.778 m (5' 10\").    Weight as of this encounter: 110.7 kg (244 lb).    Weight management plan: Discussed healthy diet and exercise guidelines    He reports that he quit smoking about 7 years ago. His smoking use included cigarettes. He has a 25.00 pack-year smoking history. He has never used smokeless tobacco.      Appropriate preventive services were discussed with this patient, including applicable screening as appropriate for cardiovascular disease, diabetes, osteopenia/osteoporosis, and glaucoma.  As appropriate for age/gender, discussed screening for colorectal cancer, prostate cancer, breast cancer, and cervical cancer. Checklist reviewing preventive services available has been given to the patient.    Reviewed patients plan of care and provided an AVS. The Basic Care Plan (routine screening as documented in Health Maintenance) for Werner meets the Care Plan requirement. This Care Plan has been established " and reviewed with the Patient.    Counseling Resources:  ATP IV Guidelines  Pooled Cohorts Equation Calculator  Breast Cancer Risk Calculator  Breast Cancer: Medication to Reduce Risk  FRAX Risk Assessment  ICSI Preventive Guidelines  Dietary Guidelines for Americans, 2010  USDA's MyPlate  ASA Prophylaxis  Lung CA Screening    Guzman Zavala MD  [facility]    ADDENDUM:  Results for orders placed or performed in visit on 08/11/21   Comprehensive metabolic panel (BMP + Alb, Alk Phos, ALT, AST, Total. Bili, TP)     Status: Normal   Result Value Ref Range    Sodium 137 133 - 144 mmol/L    Potassium 4.3 3.4 - 5.3 mmol/L    Chloride 106 94 - 109 mmol/L    Carbon Dioxide (CO2) 25 20 - 32 mmol/L    Anion Gap 6 3 - 14 mmol/L    Urea Nitrogen 13 7 - 30 mg/dL    Creatinine 0.90 0.66 - 1.25 mg/dL    Calcium 9.5 8.5 - 10.1 mg/dL    Glucose 94 70 - 99 mg/dL    Alkaline Phosphatase 54 40 - 150 U/L    AST 20 0 - 45 U/L    ALT 41 0 - 70 U/L    Protein Total 7.6 6.8 - 8.8 g/dL    Albumin 3.7 3.4 - 5.0 g/dL    Bilirubin Total 0.5 0.2 - 1.3 mg/dL    GFR Estimate 89 >60 mL/min/1.73m2   Lipid Profile (Chol, Trig, HDL, LDL calc)     Status: Abnormal   Result Value Ref Range    Cholesterol 192 <200 mg/dL    Triglycerides 151 (H) <150 mg/dL    Direct Measure HDL 64 >=40 mg/dL    LDL Cholesterol Calculated 98 <=100 mg/dL    Non HDL Cholesterol 128 <130 mg/dL    Patient Fasting > 8hrs? Yes    PSA, screen     Status: Normal   Result Value Ref Range    Prostate Specific Antigen Screen 2.44 0.00 - 4.00 ug/L   Hemoglobin A1c     Status: Normal   Result Value Ref Range    Hemoglobin A1C 5.5 0.0 - 5.6 %     My chart message sent.                      Your lab results are good,including the liver,kidney,glucose,cholesterol,and the PSA level.

## 2021-08-12 ENCOUNTER — PATIENT OUTREACH (OUTPATIENT)
Dept: GERIATRIC MEDICINE | Facility: CLINIC | Age: 66
End: 2021-08-12

## 2021-08-12 NOTE — LETTER
August 12, 2021    WERNER CRAWFORD  [address] [facility] [address]    Dear  Werner,    Welcome to [facility] Senior Care Plus (MSC+) health program. My name is Jocelin Munguia RN, MA. I am your [facility] care coordinator. You are eligible for Care Coordination through [facility] MSC+ Product.    Here is how Care Coordination works:    I will meet with you in person to determine your care coordination needs    We will develop a plan of care to meet your needs    We will create a service plan showing the services you will receive    We will talk about and coordinate any preventive care needs you have    I will call you soon to see how you are doing and determine what needs you may have. Our goal is to keep you as healthy and independent as possible.     Soon, you will receive a new [facility] member identification (ID) card from [facility]. When you receive it, please use this card along with your [place] Health Care Programs card and Prescription Drug Coverage Program card. When you receive, it please use this card where you get your health services. If you have Medicare, you will need to show your Medicare card when you get health services.    Being in the [place] Senior Care Plus (MSC+) Care Coordination program is voluntary and offered to you at no cost. If you ever wish to stop being in the Care Coordination program or have questions, call me at [phone]. If you reach my voice mail, leave a message and your phone number. If you are hearing impaired, please call the [place] Relay at 287 or [phone] (ioarcz-jv-nrruyk relay service).    Sincerely,      Jocelin Munguia RN, MA    E-mail: [email or web address]  Phone: [phone]      [employer]    [id number] accepted   (12/2019)

## 2021-08-12 NOTE — PROGRESS NOTES
[facility] Care Coordination Contact    Member became effective with On license of [facility] on 8/1/2021 with [facility] MSC+.  Previous Health Plan: [facility]  Previous Care System: n/a  Previous care coordinators name and number: n/a  Waiver Type: N/A  Last MMIS Entry: Date n/a and Type n/a  MMIS visit date (and type) if different from above: n/a  Services Listed in MMIS:   UTF received: No: Requested on nothing requested - [facility]   Sondra Ma  Case Management Specialist  [facility]  [phone]

## 2021-08-24 ENCOUNTER — PATIENT OUTREACH (OUTPATIENT)
Dept: GERIATRIC MEDICINE | Facility: CLINIC | Age: 66
End: 2021-08-24

## 2021-08-24 NOTE — LETTER
August 27, 2021    WERNER CRAWFORD  [address] [address]    Dear Werner:     Your Care Coordinator has been unable to reach you by telephone.I am writing to ask you or an authorized representative to call me at [phone]. If you reach my voicemail, please leave a message with your daytime telephone number and a date and time that I can call you. If you are hearing impaired, please call the [place] Relay at 544 or [phone] (ukjtpx-vc-pbudqt relay service).     The reason I am trying to reach you is:     [] Six (6) month check-in  [] To schedule your annual assessment  [x] Other: Introduce myself and offer you a health risk assessment    Please call me as soon as you receive this letter. I look forward to speaking with you.    Sincerely,    Jocelin Munguia RN, MA    E-mail: [email or web address]  Phone: [phone]      [facility] [id number] DHS Approved([id number])    [id number] (2/19)

## 2021-08-25 NOTE — PROGRESS NOTES
[facility] Care Coordination Contact    CC called member to introduce self as care coordinator. Member answered phone and when CC identified self, the phone disconnected. CC called member back and went to voicemail. CC left message asking member to call back.  Jocelin Munguia RN  [facility] Care Coordinator  [phone]

## 2021-08-26 NOTE — PROGRESS NOTES
[facility] Care Coordination Contact    CC called member again 8/26 and left message on voicemail requesting a call back to schedule a telephone assessment.   Jocelin Munguia RN  [facility] Care Coordinator  [phone]

## 2021-08-27 NOTE — PROGRESS NOTES
"[facility] Care Coordination Contact    Per CC, mailed client an \"Unable to Contact\" letter.  Sondra Ma  Case Management Specialist  [facility]  [phone]    "

## 2021-08-31 ENCOUNTER — PATIENT OUTREACH (OUTPATIENT)
Dept: GERIATRIC MEDICINE | Facility: CLINIC | Age: 66
End: 2021-08-31

## 2021-08-31 NOTE — PROGRESS NOTES
[facility] Care Coordination Contact      [facility] Refusal Telephone Assessment    Member refused telephone visit HRA on 08/31/21 (reason: states he just had a physical with his PCP and is healthy).    ER visits: No  Hospitalizations: No  Health concerns: None  Falls/Injuries: No  ADL/IADL Dependencies: None        Member currently receiving the following home care services:   None  Member currently receiving the following community resources:  None  Informal support(s):  wife    Advanced Care Planning discussion, complete code section.    [facility] Health Plan sponsored benefits: Shared information re: Silver Sneakers/gym memberships, ASA, Calcium +D.    Follow-Up Plan: Member informed of future contact, plan to f/u with member with a 6 month telephone assessment and offer a home visit.  Contact information shared with member and family, encouraged member to call with any questions or concerns at any time.  Jocelin Munguia RN  [facility] Care Coordinator  [phone]

## 2021-09-09 ENCOUNTER — MYC MEDICAL ADVICE (OUTPATIENT)
Dept: INTERNAL MEDICINE | Facility: CLINIC | Age: 66
End: 2021-09-09

## 2021-09-09 ENCOUNTER — TRANSFERRED RECORDS (OUTPATIENT)
Dept: HEALTH INFORMATION MANAGEMENT | Facility: CLINIC | Age: 66
End: 2021-09-09

## 2021-09-09 DIAGNOSIS — R91.8 PULMONARY NODULES: Chronic | ICD-10-CM

## 2021-09-09 DIAGNOSIS — N52.9 ERECTILE DYSFUNCTION, UNSPECIFIED ERECTILE DYSFUNCTION TYPE: ICD-10-CM

## 2021-09-09 DIAGNOSIS — Z13.6 SCREENING FOR AAA (ABDOMINAL AORTIC ANEURYSM): ICD-10-CM

## 2021-09-09 NOTE — TELEPHONE ENCOUNTER
Please see mychart from patient and advise appropriate course of action.      Viagra not on active med list     Mark Jeronimo RN  [facility] Triage Nurse

## 2021-09-10 RX ORDER — SILDENAFIL 100 MG/1
50-100 TABLET, FILM COATED ORAL DAILY PRN
Qty: 20 TABLET | Refills: 11 | Status: SHIPPED | OUTPATIENT
Start: 2021-09-10 | End: 2022-07-18

## 2021-10-03 ENCOUNTER — HEALTH MAINTENANCE LETTER (OUTPATIENT)
Age: 66
End: 2021-10-03

## 2021-10-08 ENCOUNTER — ALLIED HEALTH/NURSE VISIT (OUTPATIENT)
Dept: INTERNAL MEDICINE | Facility: CLINIC | Age: 66
End: 2021-10-08
Payer: COMMERCIAL

## 2021-10-08 DIAGNOSIS — Z23 NEED FOR INFLUENZA VACCINATION: Primary | ICD-10-CM

## 2021-10-08 PROCEDURE — 99207 PR NO CHARGE NURSE ONLY: CPT

## 2021-10-08 PROCEDURE — 90471 IMMUNIZATION ADMIN: CPT

## 2021-10-08 PROCEDURE — 90662 IIV NO PRSV INCREASED AG IM: CPT

## 2021-12-20 ENCOUNTER — TELEPHONE (OUTPATIENT)
Dept: SLEEP MEDICINE | Facility: CLINIC | Age: 66
End: 2021-12-20
Payer: COMMERCIAL

## 2021-12-20 NOTE — TELEPHONE ENCOUNTER
CALLED PT TO SCHEDULE. HE ASKED IF HE CAN JUST DROP OFF THE MACHINE IF WE ARE GOING TO SEND IT OUT LIKE LAST TIME. HE STATED THE SOONER HE CAN GET IT DROPPED OFF NAD PICKUP A LOANER THE BETTER. TOLD HIM I WILL HAVE THE STAFF AT [place] REACH OUT TO HIM DIRECTLY.

## 2021-12-21 ENCOUNTER — VIRTUAL VISIT (OUTPATIENT)
Dept: INTERNAL MEDICINE | Facility: CLINIC | Age: 66
End: 2021-12-21
Payer: COMMERCIAL

## 2021-12-21 DIAGNOSIS — J20.9 ACUTE BRONCHITIS, UNSPECIFIED ORGANISM: ICD-10-CM

## 2021-12-21 DIAGNOSIS — R05.9 COUGH: Primary | ICD-10-CM

## 2021-12-21 PROCEDURE — 99213 OFFICE O/P EST LOW 20 MIN: CPT | Mod: 95 | Performed by: INTERNAL MEDICINE

## 2021-12-21 RX ORDER — BENZONATATE 200 MG/1
200 CAPSULE ORAL 3 TIMES DAILY PRN
Qty: 21 CAPSULE | Refills: 0 | Status: SHIPPED | OUTPATIENT
Start: 2021-12-21 | End: 2022-01-21

## 2021-12-21 RX ORDER — CODEINE PHOSPHATE AND GUAIFENESIN 10; 100 MG/5ML; MG/5ML
1-2 SOLUTION ORAL EVERY 6 HOURS PRN
Qty: 120 ML | Refills: 0 | Status: SHIPPED | OUTPATIENT
Start: 2021-12-21 | End: 2022-07-18

## 2021-12-21 NOTE — PROGRESS NOTES
Werner is a 66 year old who is being evaluated via a billable telephone visit.      What phone number would you like to be contacted at? [phone]-  How would you like to obtain your AVS? MyChart    Assessment & Plan     (R05.9) Cough  (primary encounter diagnosis)  Comment: Unusual that he have identical symptoms 3 out of 4 years at nearly the exact same time of the year, this leads to potential consideration could this be some sort of allergy/asthma/reactive airway disease.  Clearly no evidence for bacterial infection, antibiotic would not be helpful.  Patient received prompt relief with the cough suppressants prescribed in 2018 and 19, will prescribe the same ingredients.  Does not sound like he had much reactive symptoms with wheezing, will defer albuterol inhaler for now.  Should he develop wheezing or any sort of constrictive breathing issues, he can contact us and we can prescribe an albuterol inhaler if needed.  Obviously contact us if symptoms change at all.  Plan:     (J20.9) Acute bronchitis, unspecified organism  Comment: As above  Plan: benzonatate (TESSALON) 200 MG capsule,         guaiFENesin-codeine (ROBITUSSIN AC) 100-10         MG/5ML solution                            Return in about 2 weeks (around 1/4/2022) for for recheck, if the symptoms fail to improve.    Barron Seo MD  [facility]   Werner is a 66 year old who presents for the following health issues         Acute Illness    Onset/Duration: 8 days  Symptoms:  Fever: no  Chills/Sweats: no  Headache (location?): no  Sinus Pressure: no  Conjunctivitis:  no  Ear Pain: no  Rhinorrhea: no  Congestion: no  Sore Throat: mild  Cough: YES - dry  Wheeze: no  Decreased Appetite: no  Nausea: no  Vomiting: no  Diarrhea: no  Dysuria/Freq.: no  Dysuria or Hematuria: no  Fatigue/Achiness: no  Sick/Strep Exposure: no  Therapies tried and outcome: cough medicine-not helpful      Mainly dry cough, but somtimes  productive, mainly in the mornings, but the nonproductive during rest of day.  Has some cough worse at bedtime and in cold or humid air.    Does not have a history of asthma.  Does not smoke.  Prior episodes Oct 2018, Nov 2019, early winter 2020    **I reviewed the information recorded in the patient's EPIC chart (including but not limited to medical history, surgical history, family history, problem list, medication list, and allergy list) and updated the information as indicated based on the patients reported information.            Review of Systems   Constitutional, HEENT, cardiovascular, pulmonary, gi and gu systems are negative, except as otherwise noted.      Objective    Vitals - Patient Reported  Weight (Patient Reported): 108.9 kg (240 lb)      Vitals:  No vitals were obtained today due to virtual visit.    Physical Exam   healthy, alert and no distress  PSYCH: Alert and oriented times 3; coherent speech, normal   rate and volume, able to articulate logical thoughts, able   to abstract reason, no tangential thoughts, no hallucinations   or delusions  His affect is normal  RESP: No cough, no audible wheezing, able to talk in full sentences  Remainder of exam unable to be completed due to telephone visits                Phone call duration: 14;45 minutes

## 2021-12-21 NOTE — PATIENT INSTRUCTIONS
*No evidence for bacterial infection, no antibiotics indicated.    *Cough suppressant medications exactly as prescribed 2018 and 19.     --Benzonatate 200 mg 3 times a day as needed for coughing.     --Robitussin with codeine, 1 to 2 teaspoons 2-4 times per day for SEVERE coughing only.  Try to use only at bedtime.  I will only give a small supply of opiates based cough medicine, as usually this is all that is needed.    *If any worsening of symptoms or change in symptoms occur, please contact us.    *Monitor your breathing for any wheezing or constriction type symptoms as this may prompt the need for an albuterol inhaler.    *   If you get these symptoms again next year in late fall early winter, then definitely allergies/asthma are in play.    No

## 2022-02-17 PROBLEM — R91.8 PULMONARY NODULES: Chronic | Status: ACTIVE | Noted: 2018-07-07

## 2022-02-18 ENCOUNTER — DOCUMENTATION ONLY (OUTPATIENT)
Dept: SLEEP MEDICINE | Facility: CLINIC | Age: 67
End: 2022-02-18
Payer: COMMERCIAL

## 2022-02-18 NOTE — PROGRESS NOTES
I called patient because I received note from my coworker Nick that patient declined having his Airsense 10 auto cpap repaired by [facility], and would like to pursue getting replacement machine instead. I told patient that in order to bill insurance for a new machine, he would need to have sleep follow up in order to get f2f notes and RX. He understands that [facility] currently has waitlist for machines, and that he would need to return loaner machine in the meanwhile. I will give patient a call once unrepaired machine is returned to [facility] showroom.

## 2022-02-28 ENCOUNTER — MYC MEDICAL ADVICE (OUTPATIENT)
Dept: SLEEP MEDICINE | Facility: CLINIC | Age: 67
End: 2022-02-28
Payer: COMMERCIAL

## 2022-02-28 DIAGNOSIS — G47.33 OSA (OBSTRUCTIVE SLEEP APNEA): Primary | ICD-10-CM

## 2022-03-01 ENCOUNTER — PATIENT OUTREACH (OUTPATIENT)
Dept: GERIATRIC MEDICINE | Facility: CLINIC | Age: 67
End: 2022-03-01
Payer: COMMERCIAL

## 2022-03-02 ENCOUNTER — TELEPHONE (OUTPATIENT)
Dept: SLEEP MEDICINE | Facility: CLINIC | Age: 67
End: 2022-03-02
Payer: COMMERCIAL

## 2022-03-02 NOTE — TELEPHONE ENCOUNTER
Patient called about the wait list. Explained patients options about what to do with machine. Patient declined to repair and will continue to use machine as is.

## 2022-03-04 NOTE — PROGRESS NOTES
[facility] Care Coordination Contact    Called member on 03/01/22 to complete six month assessment and left a message requesting a return call.  Jocelin Munguia RN  [facility] Care Coordinator  [phone]

## 2022-03-11 NOTE — PROGRESS NOTES
[facility] Care Coordination Contact      [facility] Six-Month Telephone Assessment    6 month telephone assessment completed on 03/11/22.    ER visits: No  Hospitalizations: No  TCU stays: No  Significant health status changes: None  Falls/Injuries: No  ADL/IADL changes: No  Changes in services: No    Caregiver Assessment follow up:  NA    Goals: See POC in chart for goal progress documentation.  Member states he is doing well. Declines need for assessment. He states he will call if he has any questions or concerns.     Will see member in 6 months for an annual health risk assessment.   Encouraged member to call CC with any questions or concerns in the meantime.   Jocelin Munguia RN  [facility] Care Coordinator  [phone]

## 2022-04-13 ENCOUNTER — VIRTUAL VISIT (OUTPATIENT)
Dept: SLEEP MEDICINE | Facility: CLINIC | Age: 67
End: 2022-04-13
Payer: COMMERCIAL

## 2022-04-13 VITALS — HEIGHT: 70 IN | WEIGHT: 250 LBS | BODY MASS INDEX: 35.79 KG/M2

## 2022-04-13 DIAGNOSIS — G47.33 OSA (OBSTRUCTIVE SLEEP APNEA): Primary | ICD-10-CM

## 2022-04-13 PROCEDURE — 99213 OFFICE O/P EST LOW 20 MIN: CPT | Mod: 95 | Performed by: INTERNAL MEDICINE

## 2022-04-13 ASSESSMENT — SLEEP AND FATIGUE QUESTIONNAIRES
HOW LIKELY ARE YOU TO NOD OFF OR FALL ASLEEP WHILE SITTING AND READING: WOULD NEVER DOZE
HOW LIKELY ARE YOU TO NOD OFF OR FALL ASLEEP WHILE SITTING AND TALKING TO SOMEONE: WOULD NEVER DOZE
HOW LIKELY ARE YOU TO NOD OFF OR FALL ASLEEP WHILE WATCHING TV: SLIGHT CHANCE OF DOZING
HOW LIKELY ARE YOU TO NOD OFF OR FALL ASLEEP WHEN YOU ARE A PASSENGER IN A CAR FOR AN HOUR WITHOUT A BREAK: WOULD NEVER DOZE
HOW LIKELY ARE YOU TO NOD OFF OR FALL ASLEEP WHILE LYING DOWN TO REST IN THE AFTERNOON WHEN CIRCUMSTANCES PERMIT: HIGH CHANCE OF DOZING
HOW LIKELY ARE YOU TO NOD OFF OR FALL ASLEEP IN A CAR, WHILE STOPPED FOR A FEW MINUTES IN TRAFFIC: WOULD NEVER DOZE
HOW LIKELY ARE YOU TO NOD OFF OR FALL ASLEEP WHILE SITTING QUIETLY AFTER LUNCH WITHOUT ALCOHOL: WOULD NEVER DOZE
HOW LIKELY ARE YOU TO NOD OFF OR FALL ASLEEP WHILE SITTING INACTIVE IN A PUBLIC PLACE: WOULD NEVER DOZE

## 2022-04-13 NOTE — PROGRESS NOTES
[facility]   Outpatient Sleep Medicine Follow-up Visit  April 13, 2022    Name: Werner Alvarez MRN# [id number]   Age: 66 year old YOB: 1955     Date of Consultation: April 13, 2022  Consultation is requested by: No referring provider defined for this encounter.  Primary care provider: No primary care provider on file.           Assessment and Plan:     Sleep Diagnoses:    Severe obstructive sleep apnea AHI 51    Comorbid conditions:    BMI 36    Summary Recommendations:  Return to clinic 1 year or earlier if you have recurrent symptoms of snoring, daytime sleepiness or sleep disruption         History of Present Illness:     Werner Alvarez is a 66 year old male with severe obstructive sleep apnea effectively treated with auto titration CPAP with current usage excellent greater than 8 hours per night with 100% of days greater than 8 hours based on device usage prior to device repair at the end of March.  He has had 2 repairs performed on this device and is awaiting a replacement device.         Most Recent SCALES:    EPWORTH SLEEPINESS SCALE WITHIN 1 YEAR    Sitting and reading Would never doze    Watching TV Would never doze    Sitting, inactive in a public place (theatre or mtg.) Would never doze     As a passenger in a car Would never doze    Lying down to rest in the afternoon when circumstance permit Slight chance of dozing    Sitting and talking to someone Would never doze    Sitting quietly after lunch without alcohol Would never doze    In a car, while stopped for a few minutes in traffic Would never doze    TOTAL SCORE 1        INSOMNIA SEVERITY INDEX WITHIN 1 YEAR   Difficulty falling asleep None   Difficult staying asleep None   Problems waking up to early None   How SATISFIED/DISSATISFIED are you with your CURRENT sleep pattern? Satisfied   How NOTICEABLE to others do you think your sleep pattern is in terms of your quality of life? Not at all noticeable    How WORRIED/DISTRESSED are you about your current sleep pattern? Not at all Worried   To what extent do you consider your sleep problem to INTERFERE with your daily fuctioning(e.g. daytime fatigue, mood, ability to function at work/daily chores, concentration, mood,etc.) CURRENTLY? Not at all Interfering   INSOMNIA SEVERITY INDEX TOTAL SCORE 1    --absence of insomnia (0-7); sub-threshold insomnia (8-14); moderate insomnia (15-21); and severe insomnia (22-28)--        Objective:  CPAP Compliance Targets:   >70% days > 4 hours AHI < 5   30 days ending April 13, 2022  Mask type:  Nasal Mask   [facility]   CPAP 11.0 cmH2O 30 day usage data:  76% of days with > 4 hours of use. 7/30 days with no use.   Average use 386 minutes per day.   95%ile Leak 6.9 L/min.   AHI 1.13 events per hour.                 Medications:     Current Outpatient Medications   Medication Sig     aspirin 81 MG tablet Take 1 tablet (81 mg) by mouth daily     EPINEPHrine (ANY BX GENERIC EQUIV) 0.3 MG/0.3ML injection 2-pack Inject 0.3 mLs (0.3 mg) into the muscle as needed for anaphylaxis     guaiFENesin-codeine (ROBITUSSIN AC) 100-10 MG/5ML solution Take 5-10 mLs by mouth every 6 hours as needed (FOR SEVERE COUGH ONLY)     multivitamin w/minerals (MULTI-VITAMIN) tablet Take 1 tablet by mouth daily     order for DME [id number] CPAP  13 CM H2O  WISP NASAL MASK (LARGE)     sildenafil (VIAGRA) 100 MG tablet Take 0.5-1 tablets ( mg) by mouth daily as needed Take 30 min to 4 hours before intercourse.     No current facility-administered medications for this visit.        Allergies   Allergen Reactions     Pcn [Penicillins] Unknown     Since childhood            Problem List:     Patient Active Problem List   Diagnosis     Obesity (BMI 30.0-34.9)     Obstructive sleep apnea     Preventive measure     History of smoking     Erectile dysfunction, unspecified erectile dysfunction type     Pulmonary nodules     Morbid obesity (H)     Abnormal weight gain      Fatty infiltration of liver     Screening for AAA (abdominal aortic aneurysm)     IFG (impaired fasting glucose)            Past Medical History:     Does not need 02 supplement at night   No past medical history on file.          Past Surgical History:    No h/o  upper airway surgery  Past Surgical History:   Procedure Laterality Date     ARTHROSCOPY KNEE RT/LT Bilateral               Physical Examination:   Objective:    Reported vitals:  There were no vitals taken for this visit.   GENERAL: Healthy, alert and no distress  EYES: Eyes grossly normal to inspection.  No discharge or erythema, or obvious scleral/conjunctival abnormalities.  RESP: No audible wheeze, cough, or visible cyanosis.  No visible retractions or increased work of breathing.    SKIN: Visible skin clear. No significant rash, abnormal pigmentation or lesions.  NEURO: Cranial nerves grossly intact.  Mentation and speech appropriate for age.  PSYCH: Mentation appears normal, affect normal/bright, judgement and insight intact, normal speech and appearance well-groomed.        Copy to: No primary care provider on file.      RAHEEM GUSTAFSON MD 4/13/2022         Total time spent reviewing medical records including previous testing and interpretation as well as direct patient contact and documentation on this date: 20 min

## 2022-04-13 NOTE — NURSING NOTE
1 year reminder sent to pt via [employer]. Marked to send 1 month before follow up due.    BRO Thomas

## 2022-05-20 ENCOUNTER — MYC MEDICAL ADVICE (OUTPATIENT)
Dept: INTERNAL MEDICINE | Facility: CLINIC | Age: 67
End: 2022-05-20
Payer: COMMERCIAL

## 2022-05-23 NOTE — TELEPHONE ENCOUNTER
Please see mychart from patient and advise on appropriate course of action.     Sneha Villarreal RN    [facility] Triage Nurse

## 2022-05-26 DIAGNOSIS — M25.562 LEFT KNEE PAIN, UNSPECIFIED CHRONICITY: Primary | ICD-10-CM

## 2022-05-26 NOTE — PROGRESS NOTES
patient sent [employer] message reqesuting referral to see someone about sore left knee.     Order placed for [facility].

## 2022-06-01 ENCOUNTER — OFFICE VISIT (OUTPATIENT)
Dept: ORTHOPEDICS | Facility: CLINIC | Age: 67
End: 2022-06-01
Payer: COMMERCIAL

## 2022-06-01 VITALS — SYSTOLIC BLOOD PRESSURE: 122 MMHG | BODY MASS INDEX: 35.87 KG/M2 | DIASTOLIC BLOOD PRESSURE: 78 MMHG | WEIGHT: 250 LBS

## 2022-06-01 DIAGNOSIS — M25.562 LEFT KNEE PAIN, UNSPECIFIED CHRONICITY: ICD-10-CM

## 2022-06-01 DIAGNOSIS — M17.12 PRIMARY OSTEOARTHRITIS OF LEFT KNEE: Primary | ICD-10-CM

## 2022-06-01 PROCEDURE — 99203 OFFICE O/P NEW LOW 30 MIN: CPT | Mod: 25 | Performed by: ORTHOPAEDIC SURGERY

## 2022-06-01 PROCEDURE — 20610 DRAIN/INJ JOINT/BURSA W/O US: CPT | Mod: LT | Performed by: ORTHOPAEDIC SURGERY

## 2022-06-01 RX ORDER — TESTOSTERONE CYPIONATE 200 MG/ML
2 INJECTION INTRAMUSCULAR
Status: SHIPPED | OUTPATIENT
Start: 2022-06-01

## 2022-06-01 RX ADMIN — TESTOSTERONE CYPIONATE 2 ML: 200 INJECTION INTRAMUSCULAR at 11:07

## 2022-06-01 ASSESSMENT — KOOS JR
STANDING UPRIGHT: MODERATE
STRAIGHTENING KNEE FULLY: SEVERE
TWISING OR PIVOTING ON KNEE: SEVERE
RISING FROM SITTING: SEVERE
HOW SEVERE IS YOUR KNEE STIFFNESS AFTER FIRST WAKING IN MORNING: MODERATE
GOING UP OR DOWN STAIRS: SEVERE
BENDING TO THE FLOOR TO PICK UP OBJECT: MODERATE
KOOS JR SCORING: 42.28

## 2022-06-01 NOTE — PATIENT INSTRUCTIONS
"1. Left knee pain, unspecified chronicity      Steroid injection of the  knee was performed today in clinic    - Would not soak in a hot tub, bath or swimming pool for 48 hours  - Ok to shower  - Ice today and only do your normal amounts of activity  - The lidocaine (what is giving you pain relief right now) will likely stop working in 1-2 hours.  You will then have pain again, similar to before you received the injection. The corticosteroid will not start working until approximately 1-2 weeks from now.  In a small percentage of people, cortisone can cause flushing/redness in the face. This usually lasts for 1-3 days and resolves. Cool compress and Ibuprofen/Tylenol can help if this happens.    We discussed the problem of arthritis today. Arthritis means that the joint surfaces that were once smooth are getting rough. When the rough joint surfaces are loaded and gliding, you often have pain, swelling, catching/popping, and locking type of symptoms. It can be episodic or constant. Even though the process is slow developing and chronic in nature, the symptoms can come on rather suddenly sometimes triggered by an injury or at other times without any identifiable event.  Typically, even with arthritis, most people can  function quite well with a common sense management which include: activity modifications, OTC medications (e.g.. Acetaminophen and Ibuprofen or Naproxen), icing, stretching and muscle strengthening. In general, staying active helps because it will help maintaining joint flexibility and muscle strength although \"overdoing\" and doing repetitively loading activities could worsen the symptoms. If you carry extra weight, losing weight should be a part of management of arthritis. I recommend diet along with gentle aerobic exercises such as walking, elliptical, swimming and stationary biking. Activities like jumping, pivoting, squatting, lunging, stair climbing, and kneeling are better to be avoided.  You can " also try over- the- counter braces (especially for the knee arthritis) but since the problem is an internal issue, it is not always helpful.  Formal PT is not always necessary but can be helpful if you are not sure about what exercises to do. A discussion with a dietician can be very helpful if you decided to include weight loss as a part of the treatment.  If these measures are not effective, we often resort to the injection treatment, either cortisone or viscous joint supplement. It has been shown that viscous joint injections are not very effective if arthritis is advanced and at this point is approved only for knee arthritis. The potential benefit from injections are not permanent and for that reason they can be repeated at a reasonable frequency. The duration of benefit is impossible to predict. Sometimes, it does not provide any noticeable benefit at all.  As you can imagine, surgical intervention is not the first line of management. It is important to remember that even with surgery one cannot cure arthritis unless joint replacement is considered. As was the case with the injection treatment, the response from arthroscopic surgeries  is unpredictable since we cannot make the joint surfaces back to perfectly smooth. It would be an option for someone who has tried all appropriate non-operative treatment modalities and is not quite ready for replacement type of permanent procedure. Arthroscopic procedures are more applicable to the knees, the shoulders and the ankles as opposed to the hips and fingers.

## 2022-06-01 NOTE — LETTER
2022         RE: Werner Alvarez  [address] [facility] [address]        Dear Colleague,    Thank you for referring your patient, Werner Alvarez, to the [facility] [place]. Please see a copy of my visit note below.    HISTORY OF PRESENT ILLNESS:    Werner Alvarez is a 66 year old male who is seen in consultation at the request of Dr. Seo for left knee pain. Onset of symptoms chronic with progressive worsening.     Present symptoms: Left knee pain located in the medial aspect of the knee. Patient reports pain is more sharp with activities. Pain after prolonged sitting, at his desk, at nighttime why lying on his stomach and the knee is extended. He reports pain with walking. No catching or locking. Left knee swelling.   Treatments tried to this point: compression sleeve when mowing the lawn, acupuncture, Tylenol, and Advil.  Orthopedic PMH: bilateral knee arthroscopy-meniscectomy      No past medical history on file.   Chronic obesity erectile dysfunction  Obstructive sleep apnea  History of pulmonary nodules  Fatty filtration of the liver  History of smoking        Past Surgical History:   Procedure Laterality Date     ARTHROSCOPY KNEE RT/LT Bilateral        Family History   Problem Relation Age of Onset     Emphysema Mother         still alive 2016     Other Cancer Father 78        throat cancer       Social History     Socioeconomic History     Marital status:      Spouse name: Not on file     Number of children: 3     Years of education: Not on file     Highest education level: Not on file   Occupational History     Not on file   Tobacco Use     Smoking status: Former Smoker     Packs/day: 1.00     Years: 25.00     Pack years: 25.00     Types: Cigarettes     Quit date: 2014     Years since quittin.8     Smokeless tobacco: Never Used   Substance and Sexual Activity     Alcohol use: Yes     Alcohol/week: 0.0 standard drinks     Comment: Socially      Drug use: No     Sexual activity: Yes   Other Topics Concern     Parent/sibling w/ CABG, MI or angioplasty before 65F 55M? Not Asked   Social History Narrative    10 grandchildren                  In sales of cleaning equipment.           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                          Lived in [place]; then [facility]; then                              Father had throat cancer.      Social Determinants of Health     Financial Resource Strain: Not on file   Food Insecurity: Not on file   Transportation Needs: Not on file   Physical Activity: Not on file   Stress: Not on file   Social Connections: Not on file   Intimate Partner Violence: Not on file   Housing Stability: Not on file       Current Outpatient Medications   Medication Sig Dispense Refill     aspirin 81 MG tablet Take 1 tablet (81 mg) by mouth daily 30 tablet      EPINEPHrine (ANY BX GENERIC EQUIV) 0.3 MG/0.3ML injection 2-pack Inject 0.3 mLs (0.3 mg) into the muscle as needed for anaphylaxis 1 each 1     guaiFENesin-codeine (ROBITUSSIN AC) 100-10 MG/5ML solution Take 5-10 mLs by mouth every 6 hours as needed (FOR SEVERE COUGH ONLY) 120 mL 0     multivitamin w/minerals (THERA-VIT-M) tablet Take 1 tablet by mouth daily       order for DME [id number] CPAP  13 CM H2O  WISP NASAL MASK (LARGE)       sildenafil (VIAGRA) 100 MG tablet Take 0.5-1 tablets ( mg) by mouth daily as needed Take 30 min to 4 hours before intercourse. 20 tablet 11       Allergies   Allergen Reactions     Pcn [Penicillins] Unknown     Since childhood       REVIEW OF SYSTEMS:  CONSTITUTIONAL:  NEGATIVE for fever, chills, change in weight  INTEGUMENTARY/SKIN:  NEGATIVE for worrisome rashes, moles or lesions  EYES:  NEGATIVE for vision changes or irritation  ENT/MOUTH:  NEGATIVE for ear, mouth and throat problems  RESP:  NEGATIVE for significant cough or SOB  BREAST:  NEGATIVE for masses, tenderness or discharge  CV:  NEGATIVE for chest pain,  palpitations or peripheral edema  GI:  NEGATIVE for nausea, abdominal pain, heartburn, or change in bowel habits  :  Negative   MUSCULOSKELETAL:  See HPI above  NEURO:  NEGATIVE for weakness, dizziness or paresthesias  ENDOCRINE:  NEGATIVE for temperature intolerance, skin/hair changes  HEME/ALLERGY/IMMUNE:  NEGATIVE for bleeding problems  PSYCHIATRIC:  NEGATIVE for changes in mood or affect      PHYSICAL EXAM:  /78 (BP Location: Right arm, Patient Position: Chair)   Wt 113.4 kg (250 lb)   BMI 35.87 kg/m    Body mass index is 35.87 kg/m .   GENERAL APPEARANCE: healthy, alert and no distress   HEENT: No apparent thyroid megaly. Clear sclera with normal ocular movement  RESPIRATORY: No labored breathing  SKIN: no suspicious lesions or rashes  NEURO: Normal strength and tone, mentation intact and speech normal  VASCULAR: Good pulses, and capillary refill   LYMPH: no lymphadenopathy   PSYCH:  mentation appears normal and affect normal/bright    MUSCULOSKELETAL:  Not in acute distress  Mild difficulty getting up from sitting    When he stands, obvious varus deformities noted, more so on the left  Flexion contracture of 3 to 4 degrees, left  Flexion is symmetrical 215    No erythema or effusion noted  Moderate patellofemoral crepitus, left  Minimal to mild patellofemoral crepitus, right    Palpable pain at the medial joint line, left knee  Mild patellofemoral compression related tenderness, left    Motor strength is grossly intact  Circulation is intact  Sensation is intact     ASSESSMENT:  Chronic knee pain with chronic DJD involving medial compartment and to a lesser degree anterior compartment    PLAN:  Today's x-rays of the left knee were visualized.  Findings are thoroughly explained.  Rather significant medial joint space narrowing and presence of bone spur at the patellofemoral joint or pointed out.      The treatment options were discussed including over-the-counter medication, weight loss and exercise  biking.      We also discussed potential benefit of cord injection which she accepted.    What to expect from the cord injection was thoroughly informed.    He tolerated the cord injection well.      We will see him back if pain returns.  Depending on the response, we talked about another cord injection in the future as an option.      Imaging Interpretation:   Moderate degenerative changes with joint space narrowing in the medial compartment and mild to moderate degenerative changes at the patellofemoral joint.  Varus alignment is present.  No acute fractures or other osseous pathology noted.      Large Joint Injection/Arthocentesis: L knee joint    Date/Time: 6/1/2022 11:07 AM  Performed by: Augusto Storm MD  Authorized by: Augusto Storm MD     Indications:  Pain  Needle Size:  22 G  Guidance: landmark guided    Approach:  Anterolateral  Location:  Knee      Medications:  2 mL dexamethasone 120 MG/30ML  Medications comment:  8 ml of Lidocaine 1% was infiltrated into the left knee.     Outcome:  Tolerated well, no immediate complications  Procedure discussed: discussed risks, benefits, and alternatives    Consent Given by:  Patient  Timeout: timeout called immediately prior to procedure            Augusto Storm MD  Department of Orthopedic Surgery        Disclaimer: This note consists of symbols derived from keyboarding, dictation and/or voice recognition software. As a result, there may be errors in the script that have gone undetected. Please consider this when interpreting information found in this chart.        Again, thank you for allowing me to participate in the care of your patient.        Sincerely,        Augusto Storm MD

## 2022-06-01 NOTE — PROGRESS NOTES
HISTORY OF PRESENT ILLNESS:    Werner Alvarez is a 66 year old male who is seen in consultation at the request of Dr. Seo for left knee pain. Onset of symptoms chronic with progressive worsening.     Present symptoms: Left knee pain located in the medial aspect of the knee. Patient reports pain is more sharp with activities. Pain after prolonged sitting, at his desk, at nighttime why lying on his stomach and the knee is extended. He reports pain with walking. No catching or locking. Left knee swelling.   Treatments tried to this point: compression sleeve when mowing the lawn, acupuncture, Tylenol, and Advil.  Orthopedic PMH: bilateral knee arthroscopy-meniscectomy      No past medical history on file.   Chronic obesity erectile dysfunction  Obstructive sleep apnea  History of pulmonary nodules  Fatty filtration of the liver  History of smoking        Past Surgical History:   Procedure Laterality Date     ARTHROSCOPY KNEE RT/LT Bilateral        Family History   Problem Relation Age of Onset     Emphysema Mother         still alive 2016     Other Cancer Father 78        throat cancer       Social History     Socioeconomic History     Marital status:      Spouse name: Not on file     Number of children: 3     Years of education: Not on file     Highest education level: Not on file   Occupational History     Not on file   Tobacco Use     Smoking status: Former Smoker     Packs/day: 1.00     Years: 25.00     Pack years: 25.00     Types: Cigarettes     Quit date: 2014     Years since quittin.8     Smokeless tobacco: Never Used   Substance and Sexual Activity     Alcohol use: Yes     Alcohol/week: 0.0 standard drinks     Comment: Socially     Drug use: No     Sexual activity: Yes   Other Topics Concern     Parent/sibling w/ CABG, MI or angioplasty before 65F 55M? Not Asked   Social History Narrative    10 grandchildren                  In sales of cleaning equipment.           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                          [facility] in [facility]; then [facility]; then                              Father had throat cancer.      Social Determinants of Health     Financial Resource Strain: Not on file   Food Insecurity: Not on file   Transportation Needs: Not on file   Physical Activity: Not on file   Stress: Not on file   Social Connections: Not on file   Intimate Partner Violence: Not on file   Housing Stability: Not on file       Current Outpatient Medications   Medication Sig Dispense Refill     aspirin 81 MG tablet Take 1 tablet (81 mg) by mouth daily 30 tablet      EPINEPHrine (ANY BX GENERIC EQUIV) 0.3 MG/0.3ML injection 2-pack Inject 0.3 mLs (0.3 mg) into the muscle as needed for anaphylaxis 1 each 1     guaiFENesin-codeine (ROBITUSSIN AC) 100-10 MG/5ML solution Take 5-10 mLs by mouth every 6 hours as needed (FOR SEVERE COUGH ONLY) 120 mL 0     multivitamin w/minerals (THERA-VIT-M) tablet Take 1 tablet by mouth daily       order for DME [id number] CPAP  13 CM H2O  WISP NASAL MASK (LARGE)       sildenafil (VIAGRA) 100 MG tablet Take 0.5-1 tablets ( mg) by mouth daily as needed Take 30 min to 4 hours before intercourse. 20 tablet 11       Allergies   Allergen Reactions     Pcn [Penicillins] Unknown     Since childhood       REVIEW OF SYSTEMS:  CONSTITUTIONAL:  NEGATIVE for fever, chills, change in weight  INTEGUMENTARY/SKIN:  NEGATIVE for worrisome rashes, moles or lesions  EYES:  NEGATIVE for vision changes or irritation  ENT/MOUTH:  NEGATIVE for ear, mouth and throat problems  RESP:  NEGATIVE for significant cough or SOB  BREAST:  NEGATIVE for masses, tenderness or discharge  CV:  NEGATIVE for chest pain, palpitations or peripheral edema  GI:  NEGATIVE for nausea, abdominal pain, heartburn, or change in bowel habits  :  Negative   MUSCULOSKELETAL:  See HPI above  NEURO:  NEGATIVE for weakness, dizziness or paresthesias  ENDOCRINE:  NEGATIVE for temperature  intolerance, skin/hair changes  HEME/ALLERGY/IMMUNE:  NEGATIVE for bleeding problems  PSYCHIATRIC:  NEGATIVE for changes in mood or affect      PHYSICAL EXAM:  /78 (BP Location: Right arm, Patient Position: Chair)   Wt 113.4 kg (250 lb)   BMI 35.87 kg/m    Body mass index is 35.87 kg/m .   GENERAL APPEARANCE: healthy, alert and no distress   HEENT: No apparent thyroid megaly. Clear sclera with normal ocular movement  RESPIRATORY: No labored breathing  SKIN: no suspicious lesions or rashes  NEURO: Normal strength and tone, mentation intact and speech normal  VASCULAR: Good pulses, and capillary refill   LYMPH: no lymphadenopathy   PSYCH:  mentation appears normal and affect normal/bright    MUSCULOSKELETAL:  Not in acute distress  Mild difficulty getting up from sitting    When he stands, obvious varus deformities noted, more so on the left  Flexion contracture of 3 to 4 degrees, left  Flexion is symmetrical 215    No erythema or effusion noted  Moderate patellofemoral crepitus, left  Minimal to mild patellofemoral crepitus, right    Palpable pain at the medial joint line, left knee  Mild patellofemoral compression related tenderness, left    Motor strength is grossly intact  Circulation is intact  Sensation is intact     ASSESSMENT:  Chronic knee pain with chronic DJD involving medial compartment and to a lesser degree anterior compartment    PLAN:  Today's x-rays of the left knee were visualized.  Findings are thoroughly explained.  Rather significant medial joint space narrowing and presence of bone spur at the patellofemoral joint or pointed out.      The treatment options were discussed including over-the-counter medication, weight loss and exercise biking.      We also discussed potential benefit of cord injection which she accepted.    What to expect from the cord injection was thoroughly informed.    He tolerated the cord injection well.      We will see him back if pain returns.  Depending on the  response, we talked about another cord injection in the future as an option.      Imaging Interpretation:   Moderate degenerative changes with joint space narrowing in the medial compartment and mild to moderate degenerative changes at the patellofemoral joint.  Varus alignment is present.  No acute fractures or other osseous pathology noted.      Large Joint Injection/Arthocentesis: L knee joint    Date/Time: 6/1/2022 11:07 AM  Performed by: Augusto Storm MD  Authorized by: Augusto Storm MD     Indications:  Pain  Needle Size:  22 G  Guidance: landmark guided    Approach:  Anterolateral  Location:  Knee      Medications:  2 mL dexamethasone 120 MG/30ML  Medications comment:  8 ml of Lidocaine 1% was infiltrated into the left knee.     Outcome:  Tolerated well, no immediate complications  Procedure discussed: discussed risks, benefits, and alternatives    Consent Given by:  Patient  Timeout: timeout called immediately prior to procedure            Augusto Storm MD  Department of Orthopedic Surgery        Disclaimer: This note consists of symbols derived from keyboarding, dictation and/or voice recognition software. As a result, there may be errors in the script that have gone undetected. Please consider this when interpreting information found in this chart.

## 2022-07-07 ENCOUNTER — PATIENT OUTREACH (OUTPATIENT)
Dept: GERIATRIC MEDICINE | Facility: CLINIC | Age: 67
End: 2022-07-07

## 2022-07-07 NOTE — LETTER
July 8, 2022    WERNER ZHONGMADELYN  [address] [address]        Dear Werner:    As a member of [facility]'s MSC+ program, we offer a health risk assessment at no cost to you. I know you don't want to have the assessment right now. If you change your mind, please call me at the number below.    Who performs the health risk assessment?  A [facility] Care Coordinator performs the assessment. Our Care Coordinators can also help you understand your benefits. They can tell you about services to aid you at home, such as managing your care with your doctors if your health worsens.    Our Care Coordinators are here for you if you need:    Support for activities you used to do by yourself (including making meals, bathing and paying bills)    Equipment for bathroom or home safety    Help finding a new place to live    Information on staying healthy, preventing falls and immunizations    Questions?  If you have questions, or you would like to do he assessment, call me at [phone]. TTY users call [phone]. I'm here from 8am to 5pm. I may reach out to you again soon.       Sincerely,         Jocelin Munguia RN, MA    E-mail: [email or web address]  Phone: [phone]      [employer]      \<[id number] accepted  [id number]>    [id number] (21/2021)

## 2022-07-08 NOTE — PROGRESS NOTES
[facility] Care Coordination Contact      [facility] Refusal Telephone Assessment    Member refused home visit HRA on 07/07/22 (reason: States he is doing well and has no questions or concerns).    ER visits: No  Hospitalizations: No  Health concerns: None. Had recent cortisone injection for knee pain.  Falls/Injuries: No  ADL/IADL Dependencies: None        Member currently receiving the following home care services:   None  Member currently receiving the following community resources:  None  Informal support(s):  Wife    Advanced Care Planning discussion, complete code section.    [facility] Plan sponsored benefits: Shared information re: Silver Sneakers/gym memberships, ASA, Calcium +D.    Follow-Up Plan: Member informed of future contact, plan to f/u with member with a 6 month telephone assessment and offer a home visit.  Contact information shared with member and family, encouraged member to call with any questions or concerns at any time.    This CC note routed to PCP.  Jocelin Munguia RN  [facility] Care Coordinator  [phone]

## 2022-07-08 NOTE — PROGRESS NOTES
"Per CC, mailed client a \"Refusal of Home Visit\" letter.    Sondra Ma  Case Management Specialist  [facility]  [phone]      "

## 2022-07-18 ENCOUNTER — VIRTUAL VISIT (OUTPATIENT)
Dept: FAMILY MEDICINE | Facility: CLINIC | Age: 67
End: 2022-07-18
Payer: COMMERCIAL

## 2022-07-18 DIAGNOSIS — E66.01 MORBID OBESITY (H): ICD-10-CM

## 2022-07-18 DIAGNOSIS — U07.1 INFECTION DUE TO 2019 NOVEL CORONAVIRUS: Primary | ICD-10-CM

## 2022-07-18 PROCEDURE — 99442 PR PHYSICIAN TELEPHONE EVALUATION 11-20 MIN: CPT | Mod: 95 | Performed by: PHYSICIAN ASSISTANT

## 2022-07-18 RX ORDER — ALBUTEROL SULFATE 90 UG/1
2 AEROSOL, METERED RESPIRATORY (INHALATION) EVERY 6 HOURS
Qty: 18 G | Refills: 0 | Status: SHIPPED | OUTPATIENT
Start: 2022-07-18 | End: 2022-08-15

## 2022-07-18 NOTE — PATIENT INSTRUCTIONS
Treatment Planned Paxlovid, Rx sent to [place] pharmacy  [facility]   [phone]  [address] [place] [address]    Hours:  Mon-Fri: 8:30a - 6:00p  Sat-Sun: 8:30a - 12:30p    Curbside Delivery: Patient to call [phone] to set up and  at door 2 of [facility]  Temporary change to home medications:  None       Encouraged mucinex/guafenisin, warm salt water gargles, chloraseptic spray, soothers/lozenges, sinus rinses (neilmed), flonase (2 sprays per nostril daily x 2 weeks), vitamin c, fluids and rest.  May alternate tylenol and NSAIDS (ibuprofen, advil, aleve type products) every 4-6 hours for the next few days as needed.    Prescription for rescue inhaler (albuterol) sent to pharmacy as well - option for 2 puffs at least every morning then every 4-6 hours as needed.  Return to clinic with any worsening or changes in symptoms.    Discharge Instructions for COVID-19 Patients  You have--or may have--COVID-19. Please follow the instructions listed below.   If you have a weakened immune system, discuss with your doctor any other actions you need to take.  How can I protect others?  If you have symptoms (fever, cough, body aches or trouble breathing):  Stay home and away from others (self-isolate) until:  Your other symptoms have resolved (gotten better). And   You've had no fever--and no medicine that reduces fever--for 1 full day (24 hours). And   At least 10 days have passed since your symptoms started. (You may need to wait 20 days. Follow the advice of your care team.)  If you don't show symptoms, but testing showed that you have COVID-19:  Stay home and away from others (self-isolate) until at least 10 days have passed since the date of your first positive COVID-19 test.  During this time  Stay in your own room, even for meals. Use your own bathroom if you can.  Stay away from others in your home. No hugging, kissing or shaking hands. No visitors.  Don't go to work, school or anywhere  "else.  Clean \"high touch\" surfaces often (doorknobs, counters, handles). Use household cleaning spray or wipes.  You'll find a full list of  on the EPA website: www.epa.gov/pesticide-registration/list-n-disinfectants-use-against-sars-cov-2.  Cover your mouth and nose with a mask or other face covering to avoid spreading germs.  Wash your hands and face often. Use soap and water.  Caregivers in these groups are at risk for severe illness due to COVID-19:  People 65 years and older  People who live in a nursing home or long-term care facility  People with chronic disease (lung, heart, cancer, diabetes, kidney, liver, immunologic)  People who have a weakened immune system, including those who:  Are in cancer treatment  Take medicine that weakens the immune system, such as corticosteroids  Had a bone marrow or organ transplant  Have an immune deficiency  Have poorly controlled HIV or AIDS  Are obese (body mass index of 40 or higher)  Smoke regularly  Caregivers should wear gloves while washing dishes, handling laundry and cleaning bedrooms and bathrooms.  Use caution when washing and drying laundry: Don't shake dirty laundry and use the warmest water setting that you can.  For more tips on managing your health at home, go to www.cdc.gov/coronavirus/2019-ncov/downloads/10Things.pdf.  How can I take care of myself at home?  Get lots of rest. Drink extra fluids (unless a doctor has told you not to).  Take Tylenol (acetaminophen) for fever or pain. If you have liver or kidney problems, ask your family doctor if it's okay to take Tylenol.   Adults can take either:   650 mg (two 325 mg pills) every 4 to 6 hours, or   1,000 mg (two 500 mg pills) every 8 hours as needed.  Note: Don't take more than 3,000 mg in one day. Acetaminophen is found in many medicines (both prescribed and over-the-counter medicines). Read all labels to be sure you don't take too much.   For children, check the Tylenol bottle for the right dose. " The dose is based on the child's age or weight.  If you have other health problems (like cancer, heart failure, an organ transplant or severe kidney disease): Call your specialty clinic if you don't feel better in the next 2 days.  Know when to call 911. Emergency warning signs include:  Trouble breathing or shortness of breath  Pain or pressure in the chest that doesn't go away  Feeling confused like you haven't felt before, or not being able to wake up  Bluish-colored lips or face  Your doctor may have prescribed a blood thinner medicine. Follow their instructions.  Where can I get more information?  [facility] - About COVID-19:   [email or web address]  CDC - What to Do If You're Sick: www.cdc.gov/coronavirus/2019-ncov/about/steps-when-sick.html  CDC - Ending Home Isolation: www.cdc.gov/coronavirus/2019-ncov/hcp/disposition-in-home-patients.html  [facility] - Caring for Someone: www.cdc.gov/coronavirus/2019-ncov/if-you-are-sick/care-for-someone.html  [facility] - Interim Guidance for Hospital Discharge to Home: www.health.[facility].[place]/diseases/coronavirus/hcp/hospdischarge.pdf  Below are the COVID-19 hotlines at the [facility] ([facility]). Interpreters are available.  For health questions: Call 822-721-5581 or 1-777.463.3542 (7 a.m. to 7 p.m.)  For questions about schools and childcare: Call 087-569-2141 or 1-572.791.4121 (7 a.m. to 7 p.m.)    For informational purposes only. Not to replace the advice of your health care provider. Clinically reviewed by Dr. Abran Godinez.   Copyright   2020 [employer]. All rights reserved. [employer] [id number] - REV 01/05/21.      Did you know?      You can schedule a video visit for follow-up appointments as well as future appointments for certain conditions.  Please see the below link.     Video Visits ([email or web address])     If you have not already done so,  I encourage you to sign up for Mychart ([email or web address]).   This will allow you to review your results, securely communicate with a provider, and schedule virtual visits as well.

## 2022-07-18 NOTE — PROGRESS NOTES
"Werner is a 67 year old who is being evaluated via a billable telephone visit.      What phone number would you like to be contacted at? [phone]   How would you like to obtain your AVS? MyChart    Assessment & Plan     Infection due to 2019 novel coronavirus  Prescription for antiviral sent to pharmacy; options discussed with patient at length and supportive care with rescue inhaler discussed and prescription sent to pharmacy as well. See patient instructions and return to clinic with any worsening or changes in symptoms and follow up with PCP for routine care.   - albuterol (PROAIR HFA/PROVENTIL HFA/VENTOLIN HFA) 108 (90 Base) MCG/ACT inhaler; Inhale 2 puffs into the lungs every 6 hours  - nirmatrelvir and ritonavir (PAXLOVID) therapy pack; Take 3 tablets by mouth 2 times daily for 5 days Take 2 Nirmatrelvir tablets and 1 Ritonavir tablet twice daily for 5 days.    Morbid obesity (H)  Known issue that I take into account for their medical decisions, no current exacerbations or new concerns.      Review of prior external note(s) from - previous routine PCP and speciality notes  20 minutes spent on the date of the encounter doing chart review, history and exam, documentation and further activities per the note       BMI:   Estimated body mass index is 35.87 kg/m  as calculated from the following:    Height as of 4/13/22: 1.778 m (5' 10\").    Weight as of 6/1/22: 113.4 kg (250 lb).   Weight management plan: Discussed healthy diet and exercise guidelines    Patient Instructions   Treatment Planned Paxlovid, Rx sent to [place] [facility]  [facility]   [phone]  [address] [place] [address]    Hours:  Mon-Fri: 8:30a - 6:00p  Sat-Sun: 8:30a - 12:30p    Curbside Delivery: Patient to call [phone] to set up and  at door 2 of [facility]  Temporary change to home medications:  None       Encouraged mucinex/guafenisin, warm salt water gargles, chloraseptic spray, soothers/lozenges, sinus " "rinses (neilmed), flonase (2 sprays per nostril daily x 2 weeks), vitamin c, fluids and rest.  May alternate tylenol and NSAIDS (ibuprofen, advil, aleve type products) every 4-6 hours for the next few days as needed.    Return to clinic with any worsening or changes in symptoms.    Discharge Instructions for COVID-19 Patients  You have--or may have--COVID-19. Please follow the instructions listed below.   If you have a weakened immune system, discuss with your doctor any other actions you need to take.  How can I protect others?  If you have symptoms (fever, cough, body aches or trouble breathing):  1. Stay home and away from others (self-isolate) until:  ? Your other symptoms have resolved (gotten better). And   ? You've had no fever--and no medicine that reduces fever--for 1 full day (24 hours). And   ? At least 10 days have passed since your symptoms started. (You may need to wait 20 days. Follow the advice of your care team.)  If you don't show symptoms, but testing showed that you have COVID-19:    Stay home and away from others (self-isolate) until at least 10 days have passed since the date of your first positive COVID-19 test.  During this time  1. Stay in your own room, even for meals. Use your own bathroom if you can.  2. Stay away from others in your home. No hugging, kissing or shaking hands. No visitors.  3. Don't go to work, school or anywhere else.  4. Clean \"high touch\" surfaces often (doorknobs, counters, handles). Use household cleaning spray or wipes.  5. You'll find a full list of  on the EPA website: www.epa.gov/pesticide-registration/list-n-disinfectants-use-against-sars-cov-2.  6. Cover your mouth and nose with a mask or other face covering to avoid spreading germs.  7. Wash your hands and face often. Use soap and water.  8. Caregivers in these groups are at risk for severe illness due to COVID-19:  1. People 65 years and older  2. People who live in a nursing home or long-term care " facility  3. People with chronic disease (lung, heart, cancer, diabetes, kidney, liver, immunologic)  4. People who have a weakened immune system, including those who:    Are in cancer treatment    Take medicine that weakens the immune system, such as corticosteroids    Had a bone marrow or organ transplant    Have an immune deficiency    Have poorly controlled HIV or AIDS    Are obese (body mass index of 40 or higher)    Smoke regularly  9. Caregivers should wear gloves while washing dishes, handling laundry and cleaning bedrooms and bathrooms.  10. Use caution when washing and drying laundry: Don't shake dirty laundry and use the warmest water setting that you can.  11. For more tips on managing your health at home, go to www.cdc.gov/coronavirus/2019-ncov/downloads/10Things.pdf.  How can I take care of myself at home?  1. Get lots of rest. Drink extra fluids (unless a doctor has told you not to).  2. Take Tylenol (acetaminophen) for fever or pain. If you have liver or kidney problems, ask your family doctor if it's okay to take Tylenol.   Adults can take either:   ? 650 mg (two 325 mg pills) every 4 to 6 hours, or   ? 1,000 mg (two 500 mg pills) every 8 hours as needed.  ? Note: Don't take more than 3,000 mg in one day. Acetaminophen is found in many medicines (both prescribed and over-the-counter medicines). Read all labels to be sure you don't take too much.   For children, check the Tylenol bottle for the right dose. The dose is based on the child's age or weight.  3. If you have other health problems (like cancer, heart failure, an organ transplant or severe kidney disease): Call your specialty clinic if you don't feel better in the next 2 days.  4. Know when to call 911. Emergency warning signs include:  ? Trouble breathing or shortness of breath  ? Pain or pressure in the chest that doesn't go away  ? Feeling confused like you haven't felt before, or not being able to wake up  ? Bluish-colored lips or  face  5. Your doctor may have prescribed a blood thinner medicine. Follow their instructions.  Where can I get more information?  1. [facility] - About COVID-19:   [email or web address]  2. [facility] - What to Do If You're Sick: www.cdc.gov/coronavirus/2019-ncov/about/steps-when-sick.html  3. [facility] - Ending Home Isolation: www.cdc.gov/coronavirus/2019-ncov/hcp/disposition-in-home-patients.html  4. [facility] - Caring for Someone: www.cdc.gov/coronavirus/2019-ncov/if-you-are-sick/care-for-someone.html  5. [facility] - Interim Guidance for Hospital Discharge to Home: www.health.[facility].[place]./diseases/coronavirus/hcp/hospdischarge.pdf  6. Below are the COVID-19 hotlines at the [place] Department of Health ([facility]). Interpreters are available.  ? For health questions: Call 540-803-9670 or 1-193.808.7976 (7 a.m. to 7 p.m.)  ? For questions about schools and childcare: Call 014-755-4499 or 1-333.455.9752 (7 a.m. to 7 p.m.)    For informational purposes only. Not to replace the advice of your health care provider. Clinically reviewed by Dr. Abran Godinez.   Copyright   2020 [facility]. All rights reserved. [employer] [id number] - REV 01/05/21.      Did you know?      You can schedule a video visit for follow-up appointments as well as future appointments for certain conditions.  Please see the below link.     Video Visits ([email or web address])     If you have not already done so,  I encourage you to sign up for [email or web address] ([email or web address]).  This will allow you to review your results, securely communicate with a provider, and schedule virtual visits as well.    COVID-19 positive patient.  Encounter for consideration of medication intervention. Patient does qualify for a prescription. Full discussion with patient including medication options, risks and benefits. Potential drug interactions reviewed with patient.     Treatment Planned Paxlovid, Rx sent to [facility]  "  [phone]  [address] [place] [address]    Hours:  Mon-Fri: 8:30a - 6:00p  Sat-Sun: 8:30a - 12:30p    Curbside Delivery: Patient to call [phone] to set up and  at door 2 of [facility]  Temporary change to home medications:  None     Estimated body mass index is 35.87 kg/m  as calculated from the following:    Height as of 4/13/22: 1.778 m (5' 10\").    Weight as of 6/1/22: 113.4 kg (250 lb).  GFR Estimate   Date Value Ref Range Status   08/11/2021 89 >60 mL/min/1.73m2 Final     Comment:     As of July 11, 2021, eGFR is calculated by the CKD-EPI creatinine equation, without race adjustment. eGFR can be influenced by muscle mass, exercise, and diet. The reported eGFR is an estimation only and is only applicable if the renal function is stable.   08/05/2020 >90 >60 mL/min/[1.73_m2] Final     Comment:     Non  GFR Calc  Starting 12/18/2018, serum creatinine based estimated GFR (eGFR) will be   calculated using the Chronic Kidney Disease Epidemiology Collaboration   (CKD-EPI) equation.       No results found for: [id number]    Return in about 4 weeks (around 8/15/2022) for Follow up, with PCP, Routine Visit, or sooner with worsening symptoms.    NAYANA Akers [facility] is a 67 year old presenting for the following health issues:  Covid Concern      HPI       COVID-19 Symptom Review  How many days ago did these symptoms start? Pt tested positive on 7-16-22, onset of symptoms 7/15/22    Are any of the following symptoms significant for you?1    New or worsening difficulty breathing? No    Worsening cough? Yes, it's a dry cough.     Fever or chills? No    Headache: No    Sore throat: No    Chest pain: No    Diarrhea: No    Body aches? YES    What treatments has patient tried? Advil, acetaminophen improved  Does patient live in a nursing home, group home, or shelter? No  Does patient have a way to get food/medications " during quarantined? Yes, I have a friend or family member who can help me.    {Provider  Link to COVID SmartSet :808252}      Patient has had three COVID vaccines.  This morning with mostly chest congestion with increase production.      Review of Systems   Constitutional, HEENT, cardiovascular, pulmonary, GI, , musculoskeletal, neuro, skin, endocrine and psych systems are negative, except as otherwise noted.      Objective    Vitals - Patient Reported  Weight (Patient Reported): 110.7 kg (244 lb)      Vitals:  No vitals were obtained today due to virtual visit.    Physical Exam   healthy, alert and no distress  PSYCH: Alert and oriented times 3; coherent speech, normal   rate and volume, able to articulate logical thoughts, able   to abstract reason, no tangential thoughts, no hallucinations   or delusions  His affect is normal  RESP: No cough, no audible wheezing, able to talk in full sentences  Remainder of exam unable to be completed due to telephone visits            Phone call duration: 15 minutes    .  ..

## 2022-08-15 ENCOUNTER — OFFICE VISIT (OUTPATIENT)
Dept: INTERNAL MEDICINE | Facility: CLINIC | Age: 67
End: 2022-08-15
Payer: COMMERCIAL

## 2022-08-15 VITALS
DIASTOLIC BLOOD PRESSURE: 78 MMHG | HEART RATE: 79 BPM | WEIGHT: 251 LBS | TEMPERATURE: 98.3 F | OXYGEN SATURATION: 97 % | BODY MASS INDEX: 35.93 KG/M2 | SYSTOLIC BLOOD PRESSURE: 128 MMHG | HEIGHT: 70 IN

## 2022-08-15 DIAGNOSIS — Z86.16 PERSONAL HISTORY OF COVID-19: ICD-10-CM

## 2022-08-15 DIAGNOSIS — Z00.00 ENCOUNTER FOR MEDICARE ANNUAL WELLNESS EXAM: Primary | ICD-10-CM

## 2022-08-15 DIAGNOSIS — K76.0 FATTY INFILTRATION OF LIVER: ICD-10-CM

## 2022-08-15 DIAGNOSIS — Z23 NEED FOR VACCINATION FOR PNEUMOCOCCUS: ICD-10-CM

## 2022-08-15 DIAGNOSIS — Z13.6 CARDIOVASCULAR SCREENING; LDL GOAL LESS THAN 130: ICD-10-CM

## 2022-08-15 DIAGNOSIS — G47.33 OBSTRUCTIVE SLEEP APNEA ON CPAP: ICD-10-CM

## 2022-08-15 DIAGNOSIS — Z12.5 SCREENING FOR PROSTATE CANCER: ICD-10-CM

## 2022-08-15 DIAGNOSIS — Z23 NEED FOR TDAP VACCINATION: ICD-10-CM

## 2022-08-15 DIAGNOSIS — E66.01 SEVERE OBESITY (BMI 35.0-35.9 WITH COMORBIDITY) (H): ICD-10-CM

## 2022-08-15 DIAGNOSIS — N52.9 ERECTILE DYSFUNCTION, UNSPECIFIED ERECTILE DYSFUNCTION TYPE: ICD-10-CM

## 2022-08-15 DIAGNOSIS — T63.441A BEE STING REACTION, ACCIDENTAL OR UNINTENTIONAL, INITIAL ENCOUNTER: ICD-10-CM

## 2022-08-15 DIAGNOSIS — R91.8 PULMONARY NODULES: ICD-10-CM

## 2022-08-15 LAB
ANION GAP SERPL CALCULATED.3IONS-SCNC: 6 MMOL/L (ref 3–14)
BUN SERPL-MCNC: 9 MG/DL (ref 7–30)
CALCIUM SERPL-MCNC: 9.7 MG/DL (ref 8.5–10.1)
CHLORIDE BLD-SCNC: 106 MMOL/L (ref 94–109)
CHOLEST SERPL-MCNC: 203 MG/DL
CO2 SERPL-SCNC: 27 MMOL/L (ref 20–32)
CREAT SERPL-MCNC: 0.84 MG/DL (ref 0.66–1.25)
ERYTHROCYTE [DISTWIDTH] IN BLOOD BY AUTOMATED COUNT: 14.2 % (ref 10–15)
FASTING STATUS PATIENT QL REPORTED: YES
GFR SERPL CREATININE-BSD FRML MDRD: >90 ML/MIN/1.73M2
GLUCOSE BLD-MCNC: 106 MG/DL (ref 70–99)
HCT VFR BLD AUTO: 49.6 % (ref 40–53)
HDLC SERPL-MCNC: 61 MG/DL
HGB BLD-MCNC: 16.4 G/DL (ref 13.3–17.7)
LDLC SERPL CALC-MCNC: 121 MG/DL
MCH RBC QN AUTO: 30.4 PG (ref 26.5–33)
MCHC RBC AUTO-ENTMCNC: 33.1 G/DL (ref 31.5–36.5)
MCV RBC AUTO: 92 FL (ref 78–100)
NONHDLC SERPL-MCNC: 142 MG/DL
PLATELET # BLD AUTO: 258 10E3/UL (ref 150–450)
POTASSIUM BLD-SCNC: 4.4 MMOL/L (ref 3.4–5.3)
PSA SERPL-MCNC: 3.19 UG/L (ref 0–4)
RBC # BLD AUTO: 5.39 10E6/UL (ref 4.4–5.9)
SODIUM SERPL-SCNC: 139 MMOL/L (ref 133–144)
TRIGL SERPL-MCNC: 104 MG/DL
WBC # BLD AUTO: 8 10E3/UL (ref 4–11)

## 2022-08-15 PROCEDURE — G0438 PPPS, INITIAL VISIT: HCPCS | Performed by: INTERNAL MEDICINE

## 2022-08-15 PROCEDURE — 85027 COMPLETE CBC AUTOMATED: CPT | Performed by: INTERNAL MEDICINE

## 2022-08-15 PROCEDURE — 36415 COLL VENOUS BLD VENIPUNCTURE: CPT | Performed by: INTERNAL MEDICINE

## 2022-08-15 PROCEDURE — 80048 BASIC METABOLIC PNL TOTAL CA: CPT | Performed by: INTERNAL MEDICINE

## 2022-08-15 PROCEDURE — G0103 PSA SCREENING: HCPCS | Performed by: INTERNAL MEDICINE

## 2022-08-15 PROCEDURE — 99214 OFFICE O/P EST MOD 30 MIN: CPT | Mod: 25 | Performed by: INTERNAL MEDICINE

## 2022-08-15 PROCEDURE — 80061 LIPID PANEL: CPT | Performed by: INTERNAL MEDICINE

## 2022-08-15 RX ORDER — TADALAFIL 20 MG/1
TABLET ORAL
Qty: 30 TABLET | Refills: 1 | Status: SHIPPED | OUTPATIENT
Start: 2022-08-15 | End: 2022-08-15

## 2022-08-15 RX ORDER — TADALAFIL 20 MG/1
TABLET ORAL
Qty: 30 TABLET | Refills: 1 | Status: SHIPPED | OUTPATIENT
Start: 2022-08-15 | End: 2023-12-13

## 2022-08-15 RX ORDER — EPINEPHRINE 0.3 MG/.3ML
0.3 INJECTION SUBCUTANEOUS PRN
Qty: 1 EACH | Refills: 1 | Status: SHIPPED | OUTPATIENT
Start: 2022-08-15

## 2022-08-15 ASSESSMENT — ENCOUNTER SYMPTOMS
HEARTBURN: 0
HEMATURIA: 0
DYSURIA: 0
HEMATOCHEZIA: 0
SORE THROAT: 0
NERVOUS/ANXIOUS: 0
SHORTNESS OF BREATH: 0
ABDOMINAL PAIN: 0
CHILLS: 0
FREQUENCY: 0
NAUSEA: 0
WEAKNESS: 0
JOINT SWELLING: 0
CONSTIPATION: 0
HEADACHES: 0
ARTHRALGIAS: 0
PARESTHESIAS: 0
MYALGIAS: 0
DIARRHEA: 0
EYE PAIN: 0
DIZZINESS: 0
FEVER: 0
COUGH: 0
PALPITATIONS: 0

## 2022-08-15 ASSESSMENT — ACTIVITIES OF DAILY LIVING (ADL): CURRENT_FUNCTION: NO ASSISTANCE NEEDED

## 2022-08-15 NOTE — PROGRESS NOTES
"    He is at risk for lack of exercise and has been provided with information to increase physical activity for the benefit of his well-being.  The patient was counseled and encouraged to consider modifying their diet and eating habits. He was provided with information on recommended healthy diet options.  Information on urinary incontinence and treatment options given to patient.Answers for HPI/ROS submitted by the patient on 8/15/2022  In general, how would you rate your overall physical health?: good  Frequency of exercise:: 1 day/week  Do you usually eat at least 4 servings of fruit and vegetables a day, include whole grains & fiber, and avoid regularly eating high fat or \"junk\" foods? : No  Taking medications regularly:: Yes  Medication side effects:: None  Activities of Daily Living: no assistance needed  Home safety: no safety concerns identified  Hearing Impairment:: no hearing concerns  In the past 6 months, have you been bothered by leaking of urine?: Yes  abdominal pain: No  Blood in stool: No  Blood in urine: No  chest pain: No  chills: No  congestion: No  constipation: No  cough: No  diarrhea: No  dizziness: No  ear pain: No  eye pain: No  nervous/anxious: No  fever: No  frequency: No  genital sores: No  headaches: No  hearing loss: No  heartburn: No  arthralgias: No  joint swelling: No  peripheral edema: No  mood changes: No  myalgias: No  nausea: No  dysuria: No  palpitations: No  Skin sensation changes: No  sore throat: No  urgency: No  rash: No  shortness of breath: No  visual disturbance: No  weakness: No  impotence: Yes  penile discharge: No  In general, how would you rate your overall mental or emotional health?: excellent  Additional concerns today:: No    Conflicting answers have been found for some questions. Please document the patient's answers manually.       "

## 2022-08-15 NOTE — PROGRESS NOTES
"SUBJECTIVE:   Werner Alvarez is a 67 year old male who presents for Preventive Visit.  Patient has been advised of split billing requirements and indicates understanding: Yes  Are you in the first 12 months of your Medicare coverage?  No    Healthy Habits:     In general, how would you rate your overall health?  Good    Frequency of exercise:  1 day/week    Do you usually eat at least 4 servings of fruit and vegetables a day, include whole grains    & fiber and avoid regularly eating high fat or \"junk\" foods?  No    Taking medications regularly:  Yes    Medication side effects:  None    Ability to successfully perform activities of daily living:  No assistance needed    Home Safety:  No safety concerns identified    Hearing Impairment:  No hearing concerns    In the past 6 months, have you been bothered by leaking of urine? Yes    In general, how would you rate your overall mental or emotional health?  Excellent      PHQ-2 Total Score: 0    Additional concerns today:  No    Do you feel safe in your environment? Yes    Have you ever done Advance Care Planning? (For example, a Health Directive, POLST, or a discussion with a medical provider or your loved ones about your wishes): No, advance care planning information given to patient to review.  Patient plans to discuss their wishes with loved ones or provider.       Fall risk  Fallen 2 or more times in the past year?: No  Any fall with injury in the past year?: No    Cognitive Screening   1) Repeat 3 items (Leader, Season, Table)    2) Clock draw: NORMAL  3) 3 item recall: Recalls 2 objects   Results: NORMAL clock, 1-2 items recalled: COGNITIVE IMPAIRMENT LESS LIKELY    Mini-CogTM Copyright DI Yuan. Licensed by the author for use in [facility]; reprinted with permission (kyle@.[facility]). All rights reserved.      Do you have sleep apnea, excessive snoring or daytime drowsiness?: no    Reviewed and updated as needed this visit by clinical staff   Tobacco "  Allergies  Meds     Fam Hx  Soc Hx          Reviewed and updated as needed this visit by Provider   Tobacco       Fam Hx  Soc Hx         Social History     Tobacco Use     Smoking status: Former Smoker     Packs/day: 1.00     Years: 15.00     Pack years: 15.00     Types: Cigarettes     Quit date: 2014     Years since quittin.0     Smokeless tobacco: Never Used   Substance Use Topics     Alcohol use: Yes     Comment: Socially     Alcohol Use 8/15/2022   Prescreen: >3 drinks/day or >7 drinks/week? No   Prescreen: >3 drinks/day or >7 drinks/week? -   No flowsheet data found.      1.  Has known obstructive sleep apnea.  Has been prescribed CPAP, uses it almost every night.  They feel it helps, and generally awakens feeling relatively refreshed, no daytime somnolence.    2.    The patient has had a history of ongoing obesity.  Reviewed the weigth curves.   Their current BMI is:  Body mass index is 36.01 kg/m .  Reviewed previous attempts at weight loss which have not been successful in producing prolonged weigth loss.   Discussed current eating and exercise habits.     Reviewed weights in chart:  Wt Readings from Last 10 Encounters:   08/15/22 113.9 kg (251 lb)   22 113.4 kg (250 lb)   22 113.4 kg (250 lb)   21 110.7 kg (244 lb)   21 108.9 kg (240 lb)   20 117 kg (258 lb)   20 106.6 kg (235 lb)   19 101.6 kg (224 lb)   07/10/19 97.1 kg (214 lb)   19 99.8 kg (220 lb)        3.  Needs refill on EPiPen in case of acute allergic reaction to bee sting.     4.  Taking Sildenafil (generic Viagra) as needed for erectile dysfunction.  He has not experienced any significant side effects of this medication.  It does work well for him, he would like to continue on it.     5.  Recent covid ifneciton this srping, all sx have resolved.       Current providers sharing in care for this patient include:   Patient Care Team:  Barron Seo MD as PCP - General  (Internal Medicine)  Richi Rogers MD as Assigned Sleep Provider  Jocelin Munguia RN as Lead Care Coordinator (Primary Care - CC)  Barron Seo MD as Assigned PCP  Augusto Storm MD as Assigned Musculoskeletal Provider  Renetta Beasley PA-C as Physician Assistant (Dermatology)    The following health maintenance items are reviewed in Epic and correct as of today:  Health Maintenance Due   Topic Date Due     LUNG CANCER SCREENING  Never done     DTAP/TDAP/TD IMMUNIZATION (2 - Td or Tdap) 01/01/2021     AORTIC ANEURYSM SCREENING (SYSTEM ASSIGNED)  Never done     COVID-19 Vaccine (4 - Booster for Pfizer series) 02/08/2022     Pneumococcal Vaccine: 65+ Years (2 - PCV) 08/11/2022         **I reviewed the information recorded in the patient's EPIC chart (including but not limited to medical history, surgical history, family history, problem list, medication list, and allergy list) and updated the information as indicated based on the patients reported information.             Review of Systems   Constitutional: Negative for chills and fever.   HENT: Negative for congestion, ear pain, hearing loss and sore throat.    Eyes: Negative for pain and visual disturbance.   Respiratory: Negative for cough and shortness of breath.    Cardiovascular: Negative for chest pain, palpitations and peripheral edema.   Gastrointestinal: Negative for abdominal pain, constipation, diarrhea, heartburn, hematochezia and nausea.   Genitourinary: Positive for impotence. Negative for dysuria, frequency, genital sores, hematuria, penile discharge and urgency.   Musculoskeletal: Negative for arthralgias, joint swelling and myalgias.   Skin: Negative for rash.   Neurological: Negative for dizziness, weakness, headaches and paresthesias.   Psychiatric/Behavioral: Negative for mood changes. The patient is not nervous/anxious.      Constitutional, HEENT, cardiovascular, pulmonary, gi and gu systems are negative, except as otherwise  "noted.    OBJECTIVE:   /78   Pulse 79   Temp 98.3  F (36.8  C) (Oral)   Ht 1.778 m (5' 10\")   Wt 113.9 kg (251 lb)   SpO2 97%   BMI 36.01 kg/m   Estimated body mass index is 36.01 kg/m  as calculated from the following:    Height as of this encounter: 1.778 m (5' 10\").    Weight as of this encounter: 113.9 kg (251 lb).  Physical Exam    GENERAL alert and no distress  EYES:  Normal sclera,conjunctiva, EOMI  HENT: oral and posterior pharynx without lesions or erythema, facies symmetric  NECK: Neck supple. No LAD, without thyroidmegaly.  RESP: Clear to ausculation bilaterally without wheezes or crackles. Normal BS in all fields.  CV: RRR normal S1S2 without murmurs, rubs or gallops.  LYMPH: no cervical lymph adenopathy appreciated  MS: extremities- no gross deformities of the visible extremities noted,   EXT:  no lower extremity edema  PSYCH: Alert and oriented times 3; speech- coherent  SKIN:  No obvious significant skin lesions on visible portions of face     Diagnostic Test Results:  Labs reviewed in Epic    ASSESSMENT / PLAN:     (Z00.00) Encounter for Medicare annual wellness exam  (primary encounter diagnosis)  Comment: Discussed cardiac disease risk factors and cardiac disease risk factor modification, including diabetes screening, blood pressure screening (and management if indicated), and cholesterol screening.   Reviewed immunzation guidelines, including pneumococcal vaccines, annual influenza, and shingles vaccines.   Discussed routine cancer screenings, including skin cancer, colon cancer screening for everyone until age 80, prostate cancer screening in men until age 75, mammogram and PAP/pelvic for women until age 75.   Recommended regular dentist visits to care for remaining teeth.   Recommended regular screening for vision and glaucoma.   Recommended safe driving and accident avoidance.   Plan: REVIEW OF HEALTH MAINTENANCE PROTOCOL ORDERS            (T63.441A) Bee sting reaction, accidental " "or unintentional, initial encounter  Comment: This condition is currently controlled on the current medical regimen.  Continue current therapy.   Plan: REVIEW OF HEALTH MAINTENANCE PROTOCOL ORDERS,         EPINEPHrine (ANY BX GENERIC EQUIV) 0.3 MG/0.3ML        injection 2-pack            (E66.01,  Z68.35) Severe obesity (BMI 35.0-35.9 with comorbidity) (H)  Comment: obesity contributing to obstructive sleep apnea and fatty liver  Current BMI is: Body mass index is 36.01 kg/m ..  Reviewed weight patterns.   Obesity as a biological preventable and treatable disease, which is associated with significantly increased risk of many acute and chronic health conditions.   Obesity has now been recognized as a chronic disease by the American Medical Association.  Obesity has serious co-morbidities associated with obesity including increased risk for hypertension, stroke, coronary artery disease, dyslipidemia, Type II diabetes, depression, sleep apnea, cancers of the colon, breast and endometrium, obstructive sleep apnea, osteoarthritis and female infertility.  Recommended regular aerobic exercise, recommended improved diet aiming at lowering amount of processed foods, lower sugars, moderation/reduction of simple carbs and fat in the diet, establishing more regular meal times, always eating breakfast, front loading some of the calories and adding more protein to the diet.        Plan: CBC with platelets, Basic metabolic panel,         Lipid panel reflex to direct LDL Fasting            (K76.0) Fatty infiltration of liver  Comment: Reviewed the diagnosis of \"fatty liver\" and the associated dysmetabolic syndrome.  Reviewed applicable imaging studies and lab tests.   Main treatment will be to reduce exposure to hepatotoxins such as alcohol and/or Tylenol.  Another main treatment will be to remove substances toxic to liver (especially alcohol) and a lower carbohydrate diet inducing weight loss.  If preventative measures are not " "undertaken, fatty liver can progress to steatohepatitis, which can possibly progress towards cirrhosis and liver failure (although rarely).  Recommend regular monitoring of liver function.    Plan: CBC with platelets, Lipid panel reflex to         direct LDL Fasting            (G47.33,  Z99.89) Obstructive sleep apnea on CPAP  Comment: This condition is currently controlled on the current medical regimen.  Continue current therapy.   Plan:     (R91.8) Pulmonary nodules  Comment:   Plan:     (Z13.6) CARDIOVASCULAR SCREENING; LDL GOAL LESS THAN 130  Comment: Discussed cardiac disease risk factors and cardiac disease risk factor modification.   Plan: REVIEW OF HEALTH MAINTENANCE PROTOCOL ORDERS,         CBC with platelets, Basic metabolic panel,         Lipid panel reflex to direct LDL Fasting            (Z12.5) Screening for prostate cancer  Comment: Discussed prostate cancer screening and PSA blood test.  Discussed that an elevated PSA blood test can be caused by things other than prostate cancer, including infection/inflammation, BPH, and recent sexual activity; and that elevated PSA blood test may require further investigation with a urologist   Plan: REVIEW OF HEALTH MAINTENANCE PROTOCOL ORDERS,         PSA, screen            (Z23) Need for Tdap vaccination  Comment:   Plan: REVIEW OF HEALTH MAINTENANCE PROTOCOL ORDERS,         TDAP VACCINE (Adacel, Boostrix)  [[id number]]            (Z23) Need for vaccination for pneumococcus  Comment:   Plan: REVIEW OF HEALTH MAINTENANCE PROTOCOL ORDERS,         Pneumococcal 20 Valent Conjugate (PCV20)            (N52.9) Erectile dysfunction, unspecified erectile dysfunction type  Comment: sildenafil works \"OK\" without side effects, but wants to try generic cialis.   Plan: tadalafil (CIALIS) 20 MG tablet, DISCONTINUED:         tadalafil (CIALIS) 20 MG tablet            (Z86.16) Personal history of COVID-19  Comment: resovled. No sequelae   Plan:        Patient has been advised of " "split billing requirements and indicates understanding: Yes    COUNSELING:  Reviewed preventive health counseling, as reflected in patient instructions       Regular exercise       Healthy diet/nutrition       Vision screening       Hearing screening       Dental care       Bladder control       Fall risk prevention       Immunizations    up to date     Wait at least 2-3 month from most recent covid infection for another covid booster.              Colon cancer screening       Prostate cancer screening    Estimated body mass index is 36.01 kg/m  as calculated from the following:    Height as of this encounter: 1.778 m (5' 10\").    Weight as of this encounter: 113.9 kg (251 lb).        He reports that he quit smoking about 8 years ago. His smoking use included cigarettes. He has a 15.00 pack-year smoking history. He has never used smokeless tobacco.      Appropriate preventive services were discussed with this patient, including applicable screening as appropriate for cardiovascular disease, diabetes, osteopenia/osteoporosis, and glaucoma.  As appropriate for age/gender, discussed screening for colorectal cancer, prostate cancer, breast cancer, and cervical cancer. Checklist reviewing preventive services available has been given to the patient.    Reviewed patients plan of care and provided an AVS. The  for Werner meets the Care Plan requirement. This Care Plan has been established and reviewed with the .    Counseling Resources:  ATP IV Guidelines  Pooled Cohorts Equation Calculator  Breast Cancer Risk Calculator  Breast Cancer: Medication to Reduce Risk  FRAX Risk Assessment  ICSI Preventive Guidelines  Dietary Guidelines for Americans, 2010  USDA's MyPlate  ASA Prophylaxis  Lung CA Screening    Barron Seo MD  [facility]    Identified Health Risks:  "

## 2022-08-15 NOTE — PATIENT INSTRUCTIONS
Continue all medications at the same doses.  Contact your usual pharmacy if you need refills.      Colon cancer screening next due 2025     Get Covid booster when available in Sept/October ( about 2-3 months after you covid infection)        TADALAFIL (generic Cialis):     *  Trial of Tadalfil (generic Cialis) for help with erections    *  Takle a 5, 10 or 20 mg dose (depending on your effective dose) 60 minutes before sexual intercourse.  If the lower dose does not work, then retry the low dose the next time.  If that dose dose not work, then try the next higher dose.  The medication rarely works the very first time you try it since you are likely to be nervous about the medication itself, but there has to be a first time.  The maximum dose of Cialis is 20 mg.  Any dose higher than this will not work any better and will only cause more side effects.      *  Beware of possible side effects including dizziness, headaches, colosr vision, upset stomach, nausea, passing out, problems with urination and sustained erections.  Never take the medication with nitroglycerin containing medications.  If you experience any severe side effects stop the medication and do not take it again until you speak with our clinic.     *  There may be an increased rate of side effects from Tadalfil/Cialis when alcohol is used too close to viagra    *  I will prescribe the Cialis as long as it works and does not cause side effects.  Contact me after you finish the first prescription.  If it works well with no major side effects, then I will give you further prescriptions.  If it does not work as desired, then we may consider another similar medication (Levitra or Viagra)     Check various pharmacies for the best price as most insurance plans do not cover the cost of erectile dysfunction medication and you may be paying cash for it.  You can check the estimated cash prices of most medications (and possibly get discounted price coupons)  "through [employer] or [employer].  Check their respective web sites or download the apps for further details.  Typically the cheapest prices will be found at [employer], [employer], [employer], and [employer].  Check pharmaceutical discount cards through [employer] or [employer] for additional discounts.        *  Contact me after you use the first prescripton and let me know if it works and that there are no side effects .  If it works and there are no side effects, then I will continue it.                    5 GOALS TO PREVENT VASCULAR DISEASE:     1.  Aggressive blood pressure control, under 130/80 ideally.  Using medications if needed.    Your blood pressure is under good control    BP Readings from Last 4 Encounters:   08/15/22 128/78   06/01/22 122/78   08/11/21 120/74   08/05/20 128/86       2.  Aggressive LDL cholesterol (\"bad cholesterol\") lowering as indicated.    Your goal is an LDL under 130 for sure, preferably under 100.  (If you have diabetes or previous vascular disease, the the LDL goals would be under 100 for sure, preferably under 70.)    New guidelines identify four high-risk groups who could benefit from statins:   *people with pre-existing heart disease, such as those who have had a heart attack;   *people ages 40 to 75 who have diabetes of any type  *patients ages 40 to 75 with at least a 7.5% risk of developing cardiovascular disease over the next decade, according to a formula described in the guidelines  *patients with the sort of super-high cholesterol that sometimes runs in families, as evidenced by an LDL of 190 milligrams per deciliter or higher    Your cholesterol levels are well controlled.    Recent Labs   Lab Test 08/11/21  0920 08/05/20  0911   CHOL 192 190   HDL 64 56   LDL 98 107*   TRIG 151* 134       3.  Aggressive diabetic prevention, screening and/or management.      You do not have diabetes as of the most recent blood tests.     4.  No smoking    5.  Consider daily preventative aspirin over " age 50 if you have enough cardiac risk factors to place you at higher risk for the presence of vascular disease.    If you have any reason not to take aspirin such easy bruising or bleeding, stomach problems, other anticoagulant medications, or any other side effects, then you should not take Aspirin.     --Based on your current cardiac disease risk profile and/or age over 75, you do NOT need to take daily preventative aspirin.          Preventive Health Recommendations:   Male Ages 65 and over    Yearly exam:             See your health care provider every year in order to  o   Review health changes.   o   Discuss preventive care.    o   Review your medicines if your doctor has prescribed any.  Talk with your health care provider about whether you should have a test to screen for prostate cancer (PSA).  Every 3 years, have a diabetes test (fasting glucose). If you are at risk for diabetes, you should have this test more often.  Every 5 years, have a cholesterol test. Have this test more often if you are at risk for high cholesterol or heart disease.   Every 10 years, have a colonoscopy. Or, have a yearly FIT test (stool test). These exams will check for colon cancer.  Talk to with your health care provider about screening for Abdominal Aortic Aneurysm if you have a family history of AAA or have a history of smoking.    Shots:   Get a flu shot each year.   Get a tetanus shot every 10 years.   Talk to your doctor about your pneumonia vaccines. There are now two you should receive - Pneumovax (PPSV 23) and Prevnar (PCV 13).   Talk to your doctor about a shingles vaccine.   Talk to your doctor about the hepatitis B vaccine.  Nutrition:   Eat at least 5 servings of fruits and vegetables each day.   Eat whole-grain bread, whole-wheat pasta and brown rice instead of white grains and rice.   Talk to your provider about Calcium and Vitamin D.      --Good Grains:  Oats, brown rice, Quinoa (these do not raise the blood sugar  "as much)     --Bad grains:  Anything made from wheat or white rice     (because these raise the blood sugars significantly, and the possible gluten issue from wheat for some people).      --Proteins:  Aim for \"lean proteins\" including chicken, fish, seafood, pork, turkey, and eggs (in moderation); Eat red meat only occasionally          Garcia Sellers:                         Lifestyle  Exercise for at least 150 minutes a week (30 minutes a day, 5 days a week). This will help you control your weight and prevent disease.   Limit alcohol to one drink per day.   No smoking.   Wear sunscreen to prevent skin cancer.   See your dentist every six months for an exam and cleaning.   See your eye doctor every 1 to 2 years to screen for conditions such as glaucoma, macular degeneration, cataracts, etc             Patient Education   Personalized Prevention Plan  You are due for the preventive services outlined below.  Your care team is available to assist you in scheduling these services.  If you have already completed any of these items, please share that information with your care team to update in your medical record.  Health Maintenance Due   Topic Date Due    ANNUAL REVIEW OF HM ORDERS  Never done    LUNG CANCER SCREENING  Never done    Diptheria Tetanus Pertussis (DTAP/TDAP/TD) Vaccine (2 - Td or Tdap) 01/01/2021    AORTIC ANEURYSM SCREENING (SYSTEM ASSIGNED)  Never done    COVID-19 Vaccine (4 - Booster for Pfizer series) 02/08/2022    Pneumococcal Vaccine (2 - PCV) 08/11/2022       Exercise for a Healthier Heart  You may wonder how you can improve the health of your heart. If you re thinking about exercise, you re on the right track. You don t need to become an athlete. But you do need a certain amount of brisk exercise to help strengthen your heart. If you have been diagnosed with a heart condition, your healthcare provider may advise exercise to help stabilize your condition. To help make exercise a habit, choose " safe, fun activities.      Exercise with a friend. When activity is fun, you're more likely to stick with it.   Before you start  Check with your healthcare provider before starting an exercise program. This is especially important if you have not been active for a while. It's also important if you have a long-term (chronic) health problem such as heart disease, diabetes, or obesity. Or if you are at high risk for having these problems.   Why exercise?  Exercising regularly offers many healthy rewards. It can help you do all of the following:   Improve your blood cholesterol level to help prevent further heart trouble  Lower your blood pressure to help prevent a stroke or heart attack  Control diabetes, or reduce your risk of getting this disease  Improve your heart and lung function  Reach and stay at a healthy weight  Make your muscles stronger so you can stay active  Prevent falls and fractures by slowing the loss of bone mass (osteoporosis)  Manage stress better  Reduce your blood pressure  Improve your sense of self and your body image  Exercise tips    Ease into your routine. Set small goals. Then build on them. If you are not sure what your activity level should be, talk with your healthcare provider first before starting an exercise routine.  Exercise on most days. Aim for a total of 150 minutes (2 hours and 30 minutes) or more of moderate-intensity aerobic activity each week. Or 75 minutes (1 hour and 15 minutes) or more of vigorous-intensity aerobic activity each week. Or try for a combination of both. Moderate activity means that you breathe heavier and your heart rate increases but you can still talk. Think about doing 40 minutes of moderate exercise, 3 to 4 times a week. For best results, activity should last for about 40 minutes to lower blood pressure and cholesterol. It's OK to work up to the 40-minute period over time. Examples of moderate-intensity activity are walking 1 mile in 15 minutes. Or  doing 30 to 45 minutes of yard work.  Step up your daily activity level.  Along with your exercise program, try being more active the whole day. Walk instead of drive. Or park further away so that you take more steps each day. Do more household tasks or yard work. You may not be able to meet the advised mount of physical activity. But doing some moderate- or vigorous-intensity aerobic activity can help reduce your risk for heart disease. Your healthcare provider can help you figure out what is best for you.  Choose 1 or more activities you enjoy.  Walking is one of the easiest things you can do. You can also try swimming, riding a bike, dancing, or taking an exercise class.    When to call your healthcare provider  Call your healthcare provider if you have any of these:   Chest pain or feel dizzy or lightheaded  Burning, tightness, pressure, or heaviness in your chest, neck, shoulders, back, or arms  Abnormal shortness of breath  More joint or muscle pain  A very fast or irregular heartbeat (palpitations)  [employer] last reviewed this educational content on 7/1/2019 2000-2021 [employer]. All rights reserved. This information is not intended as a substitute for professional medical care. Always follow your healthcare professional's instructions.          Understanding USDA MyPlate  The USDA has guidelines to help you make healthy food choices. These are called MyPlate. MyPlate shows the food groups that make up healthy meals using the image of a place setting. Before you eat, think about the healthiest choices for what to put on your plate or in your cup or bowl. To learn more about building a healthy plate, visit www.choosemyplate.gov.    The food groups  Fruits. Any fruit or 100% fruit juice counts as part of the Fruit Group. Fruits may be fresh, canned, frozen, or dried, and may be whole, cut-up, or pureed. Make 1/2 of your plate fruits and vegetables.  Vegetables. Any vegetable or 100% vegetable  juice counts as a member of the Vegetable Group. Vegetables may be fresh, frozen, canned, or dried. They can be served raw or cooked and may be whole, cut-up, or mashed. Make 1/2 of your plate fruits and vegetables.  Grains. All foods made from grains are part of the Grains Group. These include wheat, rice, oats, cornmeal, and barley. Grains are often used to make foods such as bread, pasta, oatmeal, cereal, tortillas, and grits. Grains should be no more than 1/4 of your plate. At least half of your grains should be whole grains.  Protein. This group includes meat, poultry, seafood, beans and peas, eggs, processed soy products (such as tofu), nuts (including nut butters), and seeds. Make protein choices no more than 1/4 of your plate. Meat and poultry choices should be lean or low fat.  Dairy. The Dairy Group includes all fluid milk products and foods made from milk that contain calcium, such as yogurt and cheese. (Foods that have little calcium, such as cream, butter, and cream cheese, are not part of this group.) Most dairy choices should be low-fat or fat-free.  Oils. Oils aren't a food group, but they do contain essential nutrients. However it's important to watch your intake of oils. These are fats that are liquid at room temperature. They include canola, corn, olive, soybean, vegetable, and sunflower oil. Foods that are mainly oil include mayonnaise, certain salad dressings, and soft margarines. You likely already get your daily oil allowance from the foods you eat.  Things to limit  Eating healthy also means limiting these things in your diet:     Salt (sodium). Many processed foods have a lot of sodium. To keep sodium intake down, eat fresh vegetables, meats, poultry, and seafood when possible. Purchase low-sodium, reduced-sodium, or no-salt-added food products at the store. And don't add salt to your meals at home. Instead, season them with herbs and spices such as dill, oregano, cumin, and paprika. Or try  adding flavor with lemon or lime zest and juice.  Saturated fat. Saturated fats are most often found in animal products such as beef, pork, and chicken. They are often solid at room temperature, such as butter. To reduce your saturated fat intake, choose leaner cuts of meat and poultry. And try healthier cooking methods such as grilling, broiling, roasting, or baking. For a simple lower-fat swap, use plain nonfat yogurt instead of mayonnaise when making potato salad or macaroni salad.  Added sugars. These are sugars added to foods. They are in foods such as ice cream, candy, soda, fruit drinks, sports drinks, energy drinks, cookies, pastries, jams, and syrups. Cut down on added sugars by sharing sweet treats with a family member or friend. You can also choose fruit for dessert, and drink water or other unsweetened beverages.     [employer] last reviewed this educational content on 6/1/2020 2000-2021 [employer]. All rights reserved. This information is not intended as a substitute for professional medical care. Always follow your healthcare professional's instructions.          Urinary Incontinence (Male)    Urinary incontinence means not being able to control the release of urine from the bladder.   Causes  Common causes of urinary incontinence in men include:  Infection  Certain medicines  Aging  Poor pelvic muscle tone  Bladder spasms  Obesity  Trouble urinating and fully emptying the bladder (urinary retention)  Other things that can cause incontinence are:   Nervous system diseases  Diabetes  Sleep apnea  Urinary tract infections  Prostate surgery  Pelvic injury  Constipation and smoking have also been identified as risk factors.   Symptoms  Urge incontinence (overactive bladder). This is a sudden urge to urinate. It occurs even though there may not be much urine in the bladder. The need to urinate often during the night is common. It's due to bladder spasms.  Stress incontinence. This is urine  leakage that you can't control. It can occur with sneezing, coughing, and other actions that put stress on the bladder.    Treatment  Treatment depends on what is causing the condition. Bladder infections are treated with antibiotics. Urinary retention is treated with a bladder catheter.   Home care  Follow these guidelines when caring for yourself at home:  Don't have any foods and drinks that may irritate the bladder. This includes:  Chocolate  Alcohol  Caffeine  Carbonated drinks  Acidic fruits and juices  Limit fluids to 6 to 8 cups a day.  Lose weight if you are overweight. This will reduce your symptoms.  If advised, do regular pelvic muscle-strengthening exercises such as Kegel exercises.  If needed, wear absorbent pads to catch urine. Change the pads often. This is for good hygiene and to prevent skin and bladder infections.  Bathe daily for good hygiene.  If an antibiotic was prescribed to treat a bladder infection, take it until it's finished. Keep taking it even if you are feeling better. This is to make sure your infection has cleared.  If a catheter was left in place, keep bacteria from getting into the collection bag. Don't disconnect the catheter from the collection bag.  Use a leg band to secure the catheter drainage tube, so it does not pull on the catheter. Drain the collection bag when it becomes full. To do this, use the drain spout at the bottom of the bag. Don't disconnect the bag from the catheter.  Don't pull on or try to remove a catheter. The catheter must be removed by a healthcare provider.  If you smoke, stop. Ask your provider for help if you can't do this on your own.  Follow-up care  Follow up with your healthcare provider, or as advised.  When to get medical advice  Call your healthcare provider right away if any of these occur:  Fever over 100.4 F (38 C), or as directed by your provider  Bladder pain or fullness  Belly swelling, nausea, or vomiting  Back pain  Weakness, dizziness,  or fainting  If a catheter was left in place, return if:  The catheter falls out  The catheter stops draining for 6 hours  Your urine gets cloudy or smells bad  [employer] last reviewed this educational content on 1/1/2020 2000-2021 [employer]. All rights reserved. This information is not intended as a substitute for professional medical care. Always follow your healthcare professional's instructions.

## 2022-08-23 NOTE — RESULT ENCOUNTER NOTE
Your labs looked good.    The minor abnormalities noted were minor and not clinically significant.

## 2022-08-24 ENCOUNTER — PATIENT OUTREACH (OUTPATIENT)
Dept: GERIATRIC MEDICINE | Facility: CLINIC | Age: 67
End: 2022-08-24

## 2022-08-24 NOTE — PROGRESS NOTES
No contact with member. [facility] regulatory update completed.   Denise Yost RN  [facility]  [phone]

## 2022-08-24 NOTE — PROGRESS NOTES
[facility] Care Coordination Contact    Internal CC change effective 9/1/22.  Mailed member CC Change letter.  Additional tasks to be completed by CMS include: update database & EPIC, enter CC Change in MMIS, and move member files on Q drive.    Sondra Ma  Case Management Specialist  [facility]  [phone]

## 2022-08-24 NOTE — LETTER
August 24, 2022    WERNER LORDJACKMADELYN  [address] [id number]      Dear Werner:    As a member of [facility] ([facility] you are provided a care coordinator. I will be your new care coordinator as of 9/1/22. I will be calling you soon to see how you are doing and determine your needs.    If you have any questions, please feel free to call me at [phone]. If you reach my voice mail, please leave a message and your phone number. If you are hearing impaired, please call the [place] [employer] at 116 or [phone] ([id number] relay service).    I look forward to speaking with you soon.    Sincerely,        Denise Yost RN, BSN, PHN  [phone]  [email or web address]                  [facility]  [id number] DHS Approved ([id number])  [id number] (11/18)

## 2022-08-30 ENCOUNTER — PATIENT OUTREACH (OUTPATIENT)
Dept: GERIATRIC MEDICINE | Facility: CLINIC | Age: 67
End: 2022-08-30

## 2022-08-30 NOTE — PROGRESS NOTES
[facility] Care Coordination Contact    Writer contacted member to introduce self as new care coordinator. Member informed writer that he may not qualify next year after he applies for new health plan in the fall. Contact information received and he states he will reach out if he has questions.   Denise Yost RN  [facility]  [phone]

## 2022-09-11 ENCOUNTER — HEALTH MAINTENANCE LETTER (OUTPATIENT)
Age: 67
End: 2022-09-11

## 2022-10-28 ENCOUNTER — TELEPHONE (OUTPATIENT)
Dept: ORTHOPEDICS | Facility: CLINIC | Age: 67
End: 2022-10-28

## 2022-10-28 NOTE — TELEPHONE ENCOUNTER
[facility] Call Center    Phone Message    May a detailed message be left on voicemail: no     Reason for Call: Other: Patient is going to [place] on 11/04. His knee isn't that bad right now, but is wondering if he should get an injection before he leaves or not. He did schedule for 11/02. Would like a c/b to discuss

## 2022-10-31 NOTE — TELEPHONE ENCOUNTER
"Phone call to patient.   He states his knee pain is no where near where it was before, but he is just not sure if he should wait longer or not. He will have some knee tenderness in the morning at times without having been very active and then be fine for a week.   He plans to do some walking and stairs on his trip. Last time it took about a week to take effect and states \"the shot was amazing.\"  Discussed that every injection can vary and that it can take up to 14 days to see the full effect from the injection. Sometimes patients can have an increase in pain for awhile initially after the injection and each injection can vary.   He states he would like to cancel the appointment and will wait until after his trip to see how he does. He was appreciative of the information.   Appointment cancelled with Dr. Bebo Wall, RN      "

## 2022-10-31 NOTE — TELEPHONE ENCOUNTER
There is a consent to communicate on file to speak with patient.     Left voicemail asking for a return call.     CESIA Wall RN

## 2022-12-23 ENCOUNTER — OFFICE VISIT (OUTPATIENT)
Dept: DERMATOLOGY | Facility: CLINIC | Age: 67
End: 2022-12-23
Payer: COMMERCIAL

## 2022-12-23 DIAGNOSIS — L81.4 LENTIGO: ICD-10-CM

## 2022-12-23 DIAGNOSIS — D18.01 ANGIOMA OF SKIN: ICD-10-CM

## 2022-12-23 DIAGNOSIS — L82.1 SEBORRHEIC KERATOSIS: ICD-10-CM

## 2022-12-23 DIAGNOSIS — L91.8 INFLAMED ACROCHORDON: ICD-10-CM

## 2022-12-23 DIAGNOSIS — D22.9 NEVUS: Primary | ICD-10-CM

## 2022-12-23 PROCEDURE — 11200 RMVL SKIN TAGS UP TO&INC 15: CPT | Performed by: PHYSICIAN ASSISTANT

## 2022-12-23 PROCEDURE — 99203 OFFICE O/P NEW LOW 30 MIN: CPT | Mod: 25 | Performed by: PHYSICIAN ASSISTANT

## 2022-12-23 NOTE — PROGRESS NOTES
HPI:   Chief complaints: Werner Alvarez is a pleasant 67 year old male who presents for Full skin cancer screening to rule out skin cancer   Last Skin Exam: 4 years ago      1st Baseline: no  Personal HX of Skin Cancer: no   Personal HX of Malignant Melanoma: no   Family HX of Skin Cancer / Malignant Melanoma: no  Personal HX of Atypical Moles:   no  Risk factors: history of sun exposure and burns  New / Changing lesions:yes irritated skin tags in armpits  Social History:   On review of systems, there are no further skin complaints, patient is feeling otherwise well.   ROS of the following were done and are negative: Constitutional, Eyes, Ears, Nose,   Mouth, Throat, Cardiovascular, Respiratory, GI, Genitourinary, Musculoskeletal,   Psychiatric, Endocrine, Allergic/Immunologic.    PHYSICAL EXAM:   There were no vitals taken for this visit.  Skin exam performed as follows: Type 2 skin. Mood appropriate  Alert and Oriented X 3. Well developed, well nourished in no distress.  General appearance: Normal  Head including face: Normal  Eyes: conjunctiva and lids: Normal  Mouth: Lips, teeth, gums: Normal  Neck: Normal  Chest-breast/axillae: Normal  Back: Normal  Spleen and liver: Normal  Cardiovascular: Exam of peripheral vascular system by observation for swelling, varicosities, edema: Normal  Genitalia: groin, buttocks: Normal  Extremities: digits/nails (clubbing): Normal  Eccrine and Apocrine glands: Normal  Right upper extremity: Normal  Left upper extremity: Normal  Right lower extremity: Normal  Left lower extremity: Normal  Skin: Scalp and body hair: See below    Pt deferred exam of breasts, groin, buttocks: No    Other physical findings:  1. Multiple pigmented macules on extremities and trunk  2. Multiple pigmented macules on face, trunk and extremities  3. Multiple vascular papules on trunk, arms and legs  4. Multiple scattered keratotic plaques  5. Inflamed pedunculated papules on the right axilla x 1, left  axilla x 2         Except as noted above, no other signs of skin cancer or melanoma.     ASSESSMENT/PLAN:   Benign Full skin cancer screening today. . Patient with history of none  Advised on monthly self exams and 1 year  Patient Education: Appropriate brochures given.    1. Multiple benign appearing melanocytic nevi on arms, legs and trunk. Discussed ABCDEs of melanoma and sunscreen.   2. Multiple lentigos on arms, legs and trunk. Advised benign, no treatment needed.  3. Multiple scattered angiomas. Advised benign, no treatment needed.   4. Seborrheic keratosis on arms, legs and trunk. Advised benign, no treatment needed.  5. Inflamed acrochordon on the right axilla x 1, left axilla x 2. Irritated per patient. Snip excision performed to all areas. Bleeding stopped. Pt tolerated well with no complications.               Follow-up: FSE every 1-3 years/PRN sooner    1.) Patient was asked about new and changing moles. YES  2.) Patient received a complete physical skin examination: YES  3.) Patient was counseled to perform a monthly self skin examination: YES  Scribed By: Renetta Beasley, MS, PAKeeC

## 2022-12-23 NOTE — LETTER
12/23/2022         RE: Werner Alvarez  [address] [facility] [address]        Dear Colleague,    Thank you for referring your patient, Werner Alvarez, to the [facility]. Please see a copy of my visit note below.    HPI:   Chief complaints: Werner Alvarez is a pleasant 67 year old male who presents for Full skin cancer screening to rule out skin cancer   Last Skin Exam: 4 years ago      1st Baseline: no  Personal HX of Skin Cancer: no   Personal HX of Malignant Melanoma: no   Family HX of Skin Cancer / Malignant Melanoma: no  Personal HX of Atypical Moles:   no  Risk factors: history of sun exposure and burns  New / Changing lesions:yes irritated skin tags in armpits  Social History:   On review of systems, there are no further skin complaints, patient is feeling otherwise well.   ROS of the following were done and are negative: Constitutional, Eyes, Ears, Nose,   Mouth, Throat, Cardiovascular, Respiratory, GI, Genitourinary, Musculoskeletal,   Psychiatric, Endocrine, Allergic/Immunologic.    PHYSICAL EXAM:   There were no vitals taken for this visit.  Skin exam performed as follows: Type 2 skin. Mood appropriate  Alert and Oriented X 3. Well developed, well nourished in no distress.  General appearance: Normal  Head including face: Normal  Eyes: conjunctiva and lids: Normal  Mouth: Lips, teeth, gums: Normal  Neck: Normal  Chest-breast/axillae: Normal  Back: Normal  Spleen and liver: Normal  Cardiovascular: Exam of peripheral vascular system by observation for swelling, varicosities, edema: Normal  Genitalia: groin, buttocks: Normal  Extremities: digits/nails (clubbing): Normal  Eccrine and Apocrine glands: Normal  Right upper extremity: Normal  Left upper extremity: Normal  Right lower extremity: Normal  Left lower extremity: Normal  Skin: Scalp and body hair: See below    Pt deferred exam of breasts, groin, buttocks: No    Other physical findings:  1. Multiple  pigmented macules on extremities and trunk  2. Multiple pigmented macules on face, trunk and extremities  3. Multiple vascular papules on trunk, arms and legs  4. Multiple scattered keratotic plaques  5. Inflamed pedunculated papules on the right axilla x 1, left axilla x 2         Except as noted above, no other signs of skin cancer or melanoma.     ASSESSMENT/PLAN:   Benign Full skin cancer screening today. . Patient with history of none  Advised on monthly self exams and 1 year  Patient Education: Appropriate brochures given.    1. Multiple benign appearing melanocytic nevi on arms, legs and trunk. Discussed ABCDEs of melanoma and sunscreen.   2. Multiple lentigos on arms, legs and trunk. Advised benign, no treatment needed.  3. Multiple scattered angiomas. Advised benign, no treatment needed.   4. Seborrheic keratosis on arms, legs and trunk. Advised benign, no treatment needed.  5. Inflamed acrochordon on the right axilla x 1, left axilla x 2. Irritated per patient. Snip excision performed to all areas. Bleeding stopped. Pt tolerated well with no complications.               Follow-up: FSE every 1-3 years/PRN sooner    1.) Patient was asked about new and changing moles. YES  2.) Patient received a complete physical skin examination: YES  3.) Patient was counseled to perform a monthly self skin examination: YES  Scribed By: Renetta Beasley MS, PASANDRA          Again, thank you for allowing me to participate in the care of your patient.        Sincerely,        Renetta Beasley PA-C

## 2023-01-13 ENCOUNTER — PATIENT OUTREACH (OUTPATIENT)
Dept: GERIATRIC MEDICINE | Facility: CLINIC | Age: 68
End: 2023-01-13

## 2023-01-13 NOTE — PROGRESS NOTES
[facility] Care Coordination Contact      [facility] Six-Month Telephone Assessment    6 month telephone assessment completed on 1/13/23.    ER visits: No  Hospitalizations: No  TCU stays: No  Significant health status changes: No  Falls/Injuries: No  ADL/IADL changes: No  Changes in services: No    Caregiver Assessment follow up:  NA    Goals: See POC in chart for goal progress documentation.      Will see member in 6 months for an annual health risk assessment.   Encouraged member to call CC with any questions or concerns in the meantime.       Denise Yost RN  [facility]  [phone]

## 2023-01-20 ENCOUNTER — DOCUMENTATION ONLY (OUTPATIENT)
Dept: SLEEP MEDICINE | Facility: CLINIC | Age: 68
End: 2023-01-20
Payer: COMMERCIAL

## 2023-01-20 DIAGNOSIS — G47.33 OBSTRUCTIVE SLEEP APNEA (ADULT) (PEDIATRIC): Primary | ICD-10-CM

## 2023-01-20 NOTE — PROGRESS NOTES
Patient was offered choice of vendor and chose [facility].  Patient Werner Herrmannger was set up at [facility]  on January 20, 2023. Patient received a Resmed Airsense 11 Pressures were set at 8-14 cm H2O.   Patient s ramp is 5 cm H2O for Off and FLEX/EPR is 2.  Patient received heated humidifier. Patient is not eligible for heated tubing or mask until March 2023. Patient does need to meet compliance. Patient has a follow up on TBD with Dr. Rogers. Appointment needed by 4/20/23.  Blanca Vicente

## 2023-03-15 ASSESSMENT — SLEEP AND FATIGUE QUESTIONNAIRES
HOW LIKELY ARE YOU TO NOD OFF OR FALL ASLEEP IN A CAR, WHILE STOPPED FOR A FEW MINUTES IN TRAFFIC: WOULD NEVER DOZE
HOW LIKELY ARE YOU TO NOD OFF OR FALL ASLEEP WHEN YOU ARE A PASSENGER IN A CAR FOR AN HOUR WITHOUT A BREAK: WOULD NEVER DOZE
HOW LIKELY ARE YOU TO NOD OFF OR FALL ASLEEP WHILE SITTING AND TALKING TO SOMEONE: WOULD NEVER DOZE
HOW LIKELY ARE YOU TO NOD OFF OR FALL ASLEEP WHILE SITTING QUIETLY AFTER LUNCH WITHOUT ALCOHOL: WOULD NEVER DOZE
HOW LIKELY ARE YOU TO NOD OFF OR FALL ASLEEP WHILE SITTING AND READING: WOULD NEVER DOZE
HOW LIKELY ARE YOU TO NOD OFF OR FALL ASLEEP WHILE WATCHING TV: SLIGHT CHANCE OF DOZING
HOW LIKELY ARE YOU TO NOD OFF OR FALL ASLEEP WHILE SITTING INACTIVE IN A PUBLIC PLACE: WOULD NEVER DOZE
HOW LIKELY ARE YOU TO NOD OFF OR FALL ASLEEP WHILE LYING DOWN TO REST IN THE AFTERNOON WHEN CIRCUMSTANCES PERMIT: SLIGHT CHANCE OF DOZING

## 2023-03-16 ENCOUNTER — OFFICE VISIT (OUTPATIENT)
Dept: SLEEP MEDICINE | Facility: CLINIC | Age: 68
End: 2023-03-16
Payer: COMMERCIAL

## 2023-03-16 VITALS
HEART RATE: 74 BPM | SYSTOLIC BLOOD PRESSURE: 121 MMHG | HEIGHT: 70 IN | WEIGHT: 237 LBS | DIASTOLIC BLOOD PRESSURE: 80 MMHG | BODY MASS INDEX: 33.93 KG/M2 | OXYGEN SATURATION: 96 %

## 2023-03-16 DIAGNOSIS — G47.33 OSA (OBSTRUCTIVE SLEEP APNEA): Primary | ICD-10-CM

## 2023-03-16 PROCEDURE — 99213 OFFICE O/P EST LOW 20 MIN: CPT | Performed by: INTERNAL MEDICINE

## 2023-03-16 NOTE — NURSING NOTE
"Chief Complaint   Patient presents with     CPAP Follow Up     New prescription       Initial /80   Pulse 74   Ht 1.778 m (5' 10\")   Wt 107.5 kg (237 lb)   SpO2 96%   BMI 34.01 kg/m   Estimated body mass index is 34.01 kg/m  as calculated from the following:    Height as of this encounter: 1.778 m (5' 10\").    Weight as of this encounter: 107.5 kg (237 lb).    Medication Reconciliation: complete  ESS 2  Janay Buchanan MA  "

## 2023-03-16 NOTE — PROGRESS NOTES
Obstructive Sleep Apnea - PAP Follow-Up Visit:    Chief Complaint   Patient presents with     CPAP Follow Up     New prescription     Werner Alvarez comes in today for follow-up of their severe sleep apnea, managed with CPAP.     PSG on 10/10/2007 at a weight of 225 pounds showed an apnea-hypopnea index of 51/h.    Do you use a CPAP Machine at home: Yes  Overall, on a scale of 0-10 how would you rate your CPAP (0 poor, 10 great): 9  Is your mask comfortable: Yes  If not, why:    Is you mask leaking: No  If yes, where do you feel it:    How many night per week does the mask leak (0-7):    Do you notice snoring with mask on: No  Do you notice gasping arousals with mask on: No  Are you having significant oral or nasal dryness: No  Is the pressure setting comfortable: Yes  In not, why:    What type of mask do you use: Nasal Mask  What is your typical bedtime: 10 pm  How long does it take you to go to sleep on PAP therapy: 5-10 min  What time do you typically get out of bed for the day: 6-7 am  How many hours on average per night are you using PAP therapy: 8  How many hours are you sleeping per night: 8  Do you feel well rested in the morning: Yes     ResMed     Auto-PAP 8-14 cmH2O download:  29/30 total days of use. 1 nonuse days. 97% days with >4 hours use.  Average use 8 hours 20 minutes per day. Median Leak 0 L/min. 95%ile Leak 10.2 L/min. CPAP 95% pressure 12.8cm. AHI 0.5    EPWORTH SLEEPINESS SCALE      Aspen Sleepiness Scale ( EVIE Castaneda  [id number]<br>ESS - [place]/English - Final version - 21 Nov 07 - [facility] [place].) 3/15/2023   Sitting and reading Would never doze   Watching TV Slight chance of dozing   Sitting, inactive in a public place (e.g. a theatre or a meeting) Would never doze   As a passenger in a car for an hour without a break Would never doze   Lying down to rest in the afternoon when circumstances permit Slight chance of dozing   Sitting and talking to someone Would never doze   Sitting  "quietly after a lunch without alcohol Would never doze   In a car, while stopped for a few minutes in traffic Would never doze   Harbor View Score (MC) 2   Harbor View Score (Sleep) 2       INSOMNIA SEVERITY INDEX (KEISHA)      Insomnia Severity Index (KEISHA) 3/15/2023   Difficulty falling asleep 0   Difficulty staying asleep 1   Problems waking up too early 1   How SATISFIED/DISSATISFIED are you with your CURRENT sleep pattern? 1   How NOTICEABLE to others do you think your sleep problem is in terms of impairing the quality of your life? 1   How WORRIED/DISTRESSED are you about your current sleep problem? 0   To what extent do you consider your sleep problem to INTERFERE with your daily functioning (e.g. daytime fatigue, mood, ability to function at work/daily chores, concentration, memory, mood, etc.) CURRENTLY? 0   KEISHA Total Score 4       Guidelines for Scoring/Interpretation:  Total score categories:  0-7 = No clinically significant insomnia   8-14 = Subthreshold insomnia   15-21 = Clinical insomnia (moderate severity)  22-28 = Clinical insomnia (severe)  Used via courtesy of www.Shuropodyth.va.gov with permission from Tone Cruz PhD., [facility]    /80   Pulse 74   Ht 1.778 m (5' 10\")   Wt 107.5 kg (237 lb)   SpO2 96%   BMI 34.01 kg/m      Impression/Plan:     Severe obstructive sleep apnea.     -  Patient is using CPAP regularly and benefiting from treatment. I reviewed download data and graphs from his device for last 30 days. Regular compliance and normal residual AHI is demonstrated, confirming adequate treatment of sleep apnea.     Plan:     1. Continue auto PAP therapy     Werner Herrmannlauren will follow up in about 1 year(s).       Prosper Link MD    CC:  Barron Seo,   "

## 2023-03-16 NOTE — PATIENT INSTRUCTIONS
Your There is no height or weight on file to calculate BMI.  Weight management is a personal decision.  If you are interested in exploring weight loss strategies, the following discussion covers the approaches that may be successful. Body mass index (BMI) is one way to tell whether you are at a healthy weight, overweight, or obese. It measures your weight in relation to your height.  A BMI of 18.5 to 24.9 is in the healthy range. A person with a BMI of 25 to 29.9 is considered overweight, and someone with a BMI of 30 or greater is considered obese. More than two-thirds of American adults are considered overweight or obese.  Being overweight or obese increases the risk for further weight gain. Excess weight may lead to heart disease and diabetes.  Creating and following plans for healthy eating and physical activity may help you improve your health.  Weight control is part of healthy lifestyle and includes exercise, emotional health, and healthy eating habits. Careful eating habits lifelong are the mainstay of weight control. Though there are significant health benefits from weight loss, long-term weight loss with diet alone may be very difficult to achieve- studies show long-term success with dietary management in less than 10% of people. Attaining a healthy weight may be especially difficult to achieve in those with severe obesity. In some cases, medications, devices and surgical management might be considered.  What can you do?  If you are overweight or obese and are interested in methods for weight loss, you should discuss this with your provider.     Consider reducing daily calorie intake by 500 calories.     Keep a food journal.     Avoiding skipping meals, consider cutting portions instead.    Diet combined with exercise helps maintain muscle while optimizing fat loss. Strength training is particularly important for building and maintaining muscle mass. Exercise helps reduce stress, increase energy, and  improves fitness. Increasing exercise without diet control, however, may not burn enough calories to loose weight.       Start walking three days a week 10-20 minutes at a time    Work towards walking thirty minutes five days a week     Eventually, increase the speed of your walking for 1-2 minutes at time    In addition, we recommend that you review healthy lifestyles and methods for weight loss available through the National Institutes of Health patient information sites:  http://win.niddk.nih.gov/publications/index.htm    And look into health and wellness programs that may be available through your health insurance provider, employer, local community center, or kristin club.

## 2023-03-21 ENCOUNTER — DOCUMENTATION ONLY (OUTPATIENT)
Dept: SLEEP MEDICINE | Facility: CLINIC | Age: 68
End: 2023-03-21
Payer: COMMERCIAL

## 2023-03-21 DIAGNOSIS — G47.33 OSA (OBSTRUCTIVE SLEEP APNEA): Primary | ICD-10-CM

## 2023-05-04 ENCOUNTER — PATIENT OUTREACH (OUTPATIENT)
Dept: GERIATRIC MEDICINE | Facility: CLINIC | Age: 68
End: 2023-05-04
Payer: COMMERCIAL

## 2023-05-04 NOTE — PROGRESS NOTES
Encounter opened due to Regulatory [employer] Trisha Update to open FVP Program.    Sondra Ma  Case Management Specialist  [facility]  [phone]

## 2023-05-05 ENCOUNTER — PATIENT OUTREACH (OUTPATIENT)
Dept: GERIATRIC MEDICINE | Facility: CLINIC | Age: 68
End: 2023-05-05
Payer: COMMERCIAL

## 2023-05-05 NOTE — PROGRESS NOTES
[facility] Care Coordination Contact      [facility] Refusal Telephone Assessment    Member refused home visit HRA on 5/4/23 (reason: States he does not need any services and is independent).    ER visits: No  Hospitalizations: No  Health concerns: Denies  Falls/Injuries: No  ADL/IADL Dependencies: None        Member currently receiving the following home care services:  None  Member currently receiving the following community resources:  None  Informal support(s):  None    Advanced Care Planning discussion, complete code section.    [facility] Plan sponsored benefits: Shared information re: Silver Sneakers/gym memberships, ASA, Calcium +D.    Follow-Up Plan: Member informed of future contact, plan to f/u with member with a 6 month telephone assessment and offer a home visit.  Contact information shared with member and family, encouraged member to call with any questions or concerns at any time.    This CC note routed to PCP.    Denise Yost RN  [facility]  [phone]

## 2023-05-05 NOTE — Clinical Note
Dr. Seo,  I am Werner's health . I am required to notify you that he declined his annual assessment. No concerns at this time. Please let me know if you have any questions.  Thank you,  Denise Yost RN [facility] [phone]

## 2023-05-08 ENCOUNTER — PATIENT OUTREACH (OUTPATIENT)
Dept: GERIATRIC MEDICINE | Facility: CLINIC | Age: 68
End: 2023-05-08
Payer: COMMERCIAL

## 2023-05-08 NOTE — LETTER
May 8, 2023    WERNER ZHONGMADELYN  [address] [facility] [address]        Dear Werner:    As a member of [facility]'s MSC[facility] program, we offer a health risk assessment at no cost to you. I know you don't want to have the assessment right now. If you change your mind, please call me at the number below.    Who performs the health risk assessment?  A [facility] Care Coordinator performs the assessment. Our Care Coordinators can also help you understand your benefits. They can tell you about services to aid you at home, such as managing your care with your doctors if your health worsens.    Our Care Coordinators are here for you if you need:  Support for activities you used to do by yourself (including making meals, bathing and paying bills)  Equipment for bathroom or home safety  Help finding a new place to live  Information on staying healthy, preventing falls and immunizations    Questions?  If you have questions, or you would like to do he assessment, call me at [phone]. TTY users call [phone]. I'm here from 8am to 5pm. I may reach out to you again soon.       Sincerely,         Denise Yost RN, BSN, PHN  [phone]  [email or web address]          \<[id number] accepted  [id number]>    [id number] (21/2021)

## 2023-05-08 NOTE — PROGRESS NOTES
"Per CC, mailed client a \"Refusal of Home Visit\" letter.    Sondra Ma  Case Management Specialist  [facility]  [phone]      "

## 2023-07-17 ENCOUNTER — PATIENT OUTREACH (OUTPATIENT)
Dept: CARE COORDINATION | Facility: CLINIC | Age: 68
End: 2023-07-17
Payer: COMMERCIAL

## 2023-07-31 ENCOUNTER — PATIENT OUTREACH (OUTPATIENT)
Dept: CARE COORDINATION | Facility: CLINIC | Age: 68
End: 2023-07-31
Payer: COMMERCIAL

## 2023-10-07 ENCOUNTER — HEALTH MAINTENANCE LETTER (OUTPATIENT)
Age: 68
End: 2023-10-07

## 2023-10-13 NOTE — PROGRESS NOTES
ASSESSMENT & PLAN    Werner was seen today for pain.    Diagnoses and all orders for this visit:    Primary osteoarthritis of right knee  Calcific tendinitis of right knee    This issue is acute on chronic and slightly worsening.  Werner's history and examination today are this with right knee pain due to his degenerative joint disease/osteoarthritis of the knee, which is overall mild on radiographs today.  His x-rays do show some deposition of calcium within the joint that is likely also related to his DJD.  He also does have some calcific tendinopathy of both the quad and patellar tendon, but on exam he has no pain over either of these areas.  Knee examination is otherwise unremarkable with full range of motion and 5 out of 5 strength which is reassuring.  We discussed the above findings and the possible treatment options.  He has had good benefit with a steroid injection in the left knee in the past, and I believe it is reasonable to try that today for acute pain relief.  Otherwise, he would benefit from physical therapy to prolong the longevity of his joint.  The following plan was jointly decided upon:  - CSI to right knee joint performed today under ultrasound guidance, see procedure note for details  - Physical therapy referral placed  - He can continue to use over-the-counter pain medications, heat, ice as tolerated  - Follow-up in our clinic as needed    Anupam Brock DO [facility] [place]    -----  Chief Complaint   Patient presents with    Right Knee - Pain       SUBJECTIVE  Werner Alvarez is a/an 68 year old male who is seen as a self referral for evaluation of right knee.     The patient is seen by themselves.  The patient is Right handed    Onset: 2-3 ,month(s) ago. Reports insidious onset without acute precipitating event.  Location of Pain: right medial patellar area  Worsened by: twisting motions, nothing too particular  Better with: nothing yet  Treatments  "tried: ice and advil  Associated symptoms: weakness of the knee and feeling of instability    Orthopedic/Surgical history: YES - Meniscus tear Date: 12 years ago  Social History/Occupation: retired      REVIEW OF SYSTEMS:  Review of Systems    OBJECTIVE:  /80   Pulse 67   Ht 1.778 m (5' 10\")   Wt 106.1 kg (234 lb)   BMI 33.58 kg/m     General: healthy, alert and in no distress  Skin: no suspicious lesions or rash.  CV: distal perfusion intact   Resp: normal respiratory effort without conversational dyspnea   Psych: normal mood and affect  Gait: Normal  Neuro: Normal light sensory exam of RL extremity     Right Knee exam  Gait: Normal  Alignment:  [x] Normal  [] Anatomic valgus  [] Anatomic varus  Inspection: [x] Normal  [] Ecchymosis present []Other   Palpation:  Joint Tenderness: [] No Tenderness  [x] MJL [x] LJL [] MCL Margin [] LCL Margin [] Pes Anserine [] Distal Quadricep  [] Other   Peripatellar Tenderness: [x] None [] Lateral pole [] Medial pole [] Superior pole [] Inferior pole    Patellar tendon pain: [x] None [] Present    Patellar apprehension: [x] None [] Present    Patellar motion: [x] Normal [] Abnormal  Crepitus: [x] None [] Present  Effusion: [x] None [] Trace [] 1+ [] 2+ [] 3+  Range of motion:  Flexion: 135, Extension: 0  Strength:  [x] Full in all planes, including intact extensor mechanism [] Limited as described  Neurologic: Normal sensation   Vascular: Normal pulses   Special tests:   Lachman: Negative  Anterior Drawer: Negative  Sag/quad Activation: NP  Posterior Drawer: Negative  Valgus Stress: Negative at 0/30  Varus Stress: Negative at 0/30  Pj's: Negative  Thessaly: NP     Other notable findings/comments: None    RADIOLOGY:  Final results and radiologist's interpretation, available in the [facility] health record.  Images were reviewed with the patient in the office today.  My personal interpretation of the performed imaging: X-rays of right knee taken in clinic today reveal " overall mild joint space narrowing and osteophytosis of all 3 compartments of the knee.  There is also calcific enthesophytes of the quad and patellar tendon.  There also small areas of hyperlucency within the knee joint consistent with calcific deposition, likely collecting chronic pathology.    Large Joint Injection/Arthocentesis: R knee joint    Date/Time: 10/17/2023 9:45 AM    Performed by: Anupam Brock MD  Authorized by: Anupam Brock MD    Indications:  Pain and osteoarthritis  Needle Size:  22 G  Guidance: ultrasound    Approach:  Anterolateral  Location:  Knee      Medications:  6 mg betamethasone acet & sod phos 6 (3-3) MG/ML; 5 mL lidocaine 1 %; 2 mL ROPivacaine 5 MG/ML  Medications comment:  1ml of 8.4% Sodium Bicarbonate solution was used to buffer the local numbing agent for today's injection    Outcome:  Tolerated well, no immediate complications  Procedure discussed: discussed risks, benefits, and alternatives    Consent Given by:  Patient  Timeout: timeout called immediately prior to procedure    Prep: patient was prepped and draped in usual sterile fashion     Ultrasound was used to ensure safe and accurate needle placement and injection. Ultrasound images of the procedure were permanently stored.

## 2023-10-17 ENCOUNTER — OFFICE VISIT (OUTPATIENT)
Dept: ORTHOPEDICS | Facility: CLINIC | Age: 68
End: 2023-10-17
Payer: COMMERCIAL

## 2023-10-17 ENCOUNTER — ANCILLARY PROCEDURE (OUTPATIENT)
Dept: GENERAL RADIOLOGY | Facility: CLINIC | Age: 68
End: 2023-10-17
Attending: STUDENT IN AN ORGANIZED HEALTH CARE EDUCATION/TRAINING PROGRAM
Payer: COMMERCIAL

## 2023-10-17 VITALS
HEART RATE: 67 BPM | WEIGHT: 234 LBS | DIASTOLIC BLOOD PRESSURE: 80 MMHG | HEIGHT: 70 IN | SYSTOLIC BLOOD PRESSURE: 132 MMHG | BODY MASS INDEX: 33.5 KG/M2

## 2023-10-17 DIAGNOSIS — M17.11 PRIMARY OSTEOARTHRITIS OF RIGHT KNEE: Primary | ICD-10-CM

## 2023-10-17 DIAGNOSIS — M65.261 CALCIFIC TENDINITIS OF RIGHT KNEE: ICD-10-CM

## 2023-10-17 DIAGNOSIS — M25.561 CHRONIC PAIN OF RIGHT KNEE: ICD-10-CM

## 2023-10-17 DIAGNOSIS — G89.29 CHRONIC PAIN OF RIGHT KNEE: ICD-10-CM

## 2023-10-17 PROCEDURE — 73562 X-RAY EXAM OF KNEE 3: CPT | Mod: TC | Performed by: RADIOLOGY

## 2023-10-17 PROCEDURE — 99204 OFFICE O/P NEW MOD 45 MIN: CPT | Mod: 25 | Performed by: STUDENT IN AN ORGANIZED HEALTH CARE EDUCATION/TRAINING PROGRAM

## 2023-10-17 PROCEDURE — 20611 DRAIN/INJ JOINT/BURSA W/US: CPT | Mod: RT | Performed by: STUDENT IN AN ORGANIZED HEALTH CARE EDUCATION/TRAINING PROGRAM

## 2023-10-17 RX ORDER — LIDOCAINE HYDROCHLORIDE 10 MG/ML
5 INJECTION, SOLUTION INFILTRATION; PERINEURAL
Status: SHIPPED | OUTPATIENT
Start: 2023-10-17

## 2023-10-17 RX ORDER — ROPIVACAINE HYDROCHLORIDE 5 MG/ML
2 INJECTION, SOLUTION EPIDURAL; INFILTRATION; PERINEURAL
Status: SHIPPED | OUTPATIENT
Start: 2023-10-17

## 2023-10-17 RX ORDER — BETAMETHASONE SODIUM PHOSPHATE AND BETAMETHASONE ACETATE 3; 3 MG/ML; MG/ML
6 INJECTION, SUSPENSION INTRA-ARTICULAR; INTRALESIONAL; INTRAMUSCULAR; SOFT TISSUE
Status: SHIPPED | OUTPATIENT
Start: 2023-10-17

## 2023-10-17 RX ADMIN — BETAMETHASONE SODIUM PHOSPHATE AND BETAMETHASONE ACETATE 6 MG: 3; 3 INJECTION, SUSPENSION INTRA-ARTICULAR; INTRALESIONAL; INTRAMUSCULAR; SOFT TISSUE at 09:45

## 2023-10-17 RX ADMIN — LIDOCAINE HYDROCHLORIDE 5 ML: 10 INJECTION, SOLUTION INFILTRATION; PERINEURAL at 09:45

## 2023-10-17 RX ADMIN — ROPIVACAINE HYDROCHLORIDE 2 ML: 5 INJECTION, SOLUTION EPIDURAL; INFILTRATION; PERINEURAL at 09:45

## 2023-10-17 NOTE — LETTER
10/17/2023         RE: Werner Alvarez  [address] [facility] [address]        Dear Colleague,    Thank you for referring your patient, Werner Alvarez, to the [facility]. Please see a copy of my visit note below.    ASSESSMENT & PLAN    Werner was seen today for pain.    Diagnoses and all orders for this visit:    Primary osteoarthritis of right knee  Calcific tendinitis of right knee    This issue is acute on chronic and slightly worsening.  Werner's history and examination today are this with right knee pain due to his degenerative joint disease/osteoarthritis of the knee, which is overall mild on radiographs today.  His x-rays do show some deposition of calcium within the joint that is likely also related to his DJD.  He also does have some calcific tendinopathy of both the quad and patellar tendon, but on exam he has no pain over either of these areas.  Knee examination is otherwise unremarkable with full range of motion and 5 out of 5 strength which is reassuring.  We discussed the above findings and the possible treatment options.  He has had good benefit with a steroid injection in the left knee in the past, and I believe it is reasonable to try that today for acute pain relief.  Otherwise, he would benefit from physical therapy to prolong the longevity of his joint.  The following plan was jointly decided upon:  - CSI to right knee joint performed today under ultrasound guidance, see procedure note for details  - Physical therapy referral placed  - He can continue to use over-the-counter pain medications, heat, ice as tolerated  - Follow-up in our clinic as needed    DO IGNACIO Quiroz  [facility]    -----  Chief Complaint   Patient presents with     Right Knee - Pain       SUBJECTIVE  Werner Alvarez is a/an 68 year old male who is seen as a self referral for evaluation of right knee.     The patient is  "seen by themselves.  The patient is Right handed    Onset: 2-3 ,month(s) ago. Reports insidious onset without acute precipitating event.  Location of Pain: right medial patellar area  Worsened by: twisting motions, nothing too particular  Better with: nothing yet  Treatments tried: ice and advil  Associated symptoms: weakness of the knee and feeling of instability    Orthopedic/Surgical history: YES - Meniscus tear Date: 12 years ago  Social History/Occupation: retired      REVIEW OF SYSTEMS:  Review of Systems    OBJECTIVE:  /80   Pulse 67   Ht 1.778 m (5' 10\")   Wt 106.1 kg (234 lb)   BMI 33.58 kg/m     General: healthy, alert and in no distress  Skin: no suspicious lesions or rash.  CV: distal perfusion intact   Resp: normal respiratory effort without conversational dyspnea   Psych: normal mood and affect  Gait: Normal  Neuro: Normal light sensory exam of RL extremity     Right Knee exam  Gait: Normal  Alignment:  [x] Normal  [] Anatomic valgus  [] Anatomic varus  Inspection: [x] Normal  [] Ecchymosis present []Other   Palpation:  Joint Tenderness: [] No Tenderness  [x] MJL [x] LJL [] MCL Margin [] LCL Margin [] Pes Anserine [] Distal Quadricep  [] Other   Peripatellar Tenderness: [x] None [] Lateral pole [] Medial pole [] Superior pole [] Inferior pole    Patellar tendon pain: [x] None [] Present    Patellar apprehension: [x] None [] Present    Patellar motion: [x] Normal [] Abnormal  Crepitus: [x] None [] Present  Effusion: [x] None [] Trace [] 1+ [] 2+ [] 3+  Range of motion:  Flexion: 135, Extension: 0  Strength:  [x] Full in all planes, including intact extensor mechanism [] Limited as described  Neurologic: Normal sensation   Vascular: Normal pulses   Special tests:   Lachman: Negative  Anterior Drawer: Negative  Sag/quad Activation: NP  Posterior Drawer: Negative  Valgus Stress: Negative at 0/30  Varus Stress: Negative at 0/30  Pj's: Negative  Thessaly: NP     Other notable " findings/comments: None    RADIOLOGY:  Final results and radiologist's interpretation, available in the [facility] health record.  Images were reviewed with the patient in the office today.  My personal interpretation of the performed imaging: X-rays of right knee taken in clinic today reveal overall mild joint space narrowing and osteophytosis of all 3 compartments of the knee.  There is also calcific enthesophytes of the quad and patellar tendon.  There also small areas of hyperlucency within the knee joint consistent with calcific deposition, likely collecting chronic pathology.    Large Joint Injection/Arthocentesis: R knee joint    Date/Time: 10/17/2023 9:45 AM    Performed by: Anupam Brock MD  Authorized by: Anupam Brock MD    Indications:  Pain and osteoarthritis  Needle Size:  22 G  Guidance: ultrasound    Approach:  Anterolateral  Location:  Knee      Medications:  6 mg betamethasone acet & sod phos 6 (3-3) MG/ML; 5 mL lidocaine 1 %; 2 mL ROPivacaine 5 MG/ML  Medications comment:  1ml of 8.4% Sodium Bicarbonate solution was used to buffer the local numbing agent for today's injection    Outcome:  Tolerated well, no immediate complications  Procedure discussed: discussed risks, benefits, and alternatives    Consent Given by:  Patient  Timeout: timeout called immediately prior to procedure    Prep: patient was prepped and draped in usual sterile fashion     Ultrasound was used to ensure safe and accurate needle placement and injection. Ultrasound images of the procedure were permanently stored.                         Again, thank you for allowing me to participate in the care of your patient.        Sincerely,        Anupam Brock MD

## 2023-10-31 ENCOUNTER — PATIENT OUTREACH (OUTPATIENT)
Dept: GERIATRIC MEDICINE | Facility: CLINIC | Age: 68
End: 2023-10-31
Payer: COMMERCIAL

## 2023-11-02 ASSESSMENT — ACTIVITIES OF DAILY LIVING (ADL)
LIMPING: THE SYMPTOM AFFECTS MY ACTIVITY SEVERELY
SIT WITH YOUR KNEE BENT: ACTIVITY IS NOT DIFFICULT
RAW_SCORE: 43
SWELLING: THE SYMPTOM AFFECTS MY ACTIVITY MODERATELY
GIVING WAY, BUCKLING OR SHIFTING OF KNEE: THE SYMPTOM AFFECTS MY ACTIVITY MODERATELY
HOW_WOULD_YOU_RATE_THE_OVERALL_FUNCTION_OF_YOUR_KNEE_DURING_YOUR_USUAL_DAILY_ACTIVITIES?: ABNORMAL
WEAKNESS: THE SYMPTOM AFFECTS MY ACTIVITY MODERATELY
AS_A_RESULT_OF_YOUR_KNEE_INJURY,_HOW_WOULD_YOU_RATE_YOUR_CURRENT_LEVEL_OF_DAILY_ACTIVITY?: NEARLY NORMAL
RISE FROM A CHAIR: ACTIVITY IS MINIMALLY DIFFICULT
HOW_WOULD_YOU_RATE_THE_CURRENT_FUNCTION_OF_YOUR_KNEE_DURING_YOUR_USUAL_DAILY_ACTIVITIES_ON_A_SCALE_FROM_0_TO_100_WITH_100_BEING_YOUR_LEVEL_OF_KNEE_FUNCTION_PRIOR_TO_YOUR_INJURY_AND_0_BEING_THE_INABILITY_TO_PERFORM_ANY_OF_YOUR_USUAL_DAILY_ACTIVITIES?: 95
STIFFNESS: THE SYMPTOM AFFECTS MY ACTIVITY MODERATELY
KNEE_ACTIVITY_OF_DAILY_LIVING_SCORE: 61.43
GO DOWN STAIRS: ACTIVITY IS MINIMALLY DIFFICULT
STAND: ACTIVITY IS MINIMALLY DIFFICULT
KNEE_ACTIVITY_OF_DAILY_LIVING_SUM: 43
WALK: ACTIVITY IS MINIMALLY DIFFICULT
GO UP STAIRS: ACTIVITY IS MINIMALLY DIFFICULT
PAIN: THE SYMPTOM AFFECTS MY ACTIVITY SEVERELY
KNEEL ON THE FRONT OF YOUR KNEE: ACTIVITY IS MINIMALLY DIFFICULT
SQUAT: ACTIVITY IS MINIMALLY DIFFICULT

## 2023-11-06 ENCOUNTER — THERAPY VISIT (OUTPATIENT)
Dept: PHYSICAL THERAPY | Facility: CLINIC | Age: 68
End: 2023-11-06
Attending: STUDENT IN AN ORGANIZED HEALTH CARE EDUCATION/TRAINING PROGRAM
Payer: COMMERCIAL

## 2023-11-06 DIAGNOSIS — M17.11 PRIMARY OSTEOARTHRITIS OF RIGHT KNEE: ICD-10-CM

## 2023-11-06 PROCEDURE — 97161 PT EVAL LOW COMPLEX 20 MIN: CPT | Mod: GP | Performed by: PHYSICAL THERAPIST

## 2023-11-06 PROCEDURE — 97110 THERAPEUTIC EXERCISES: CPT | Mod: GP | Performed by: PHYSICAL THERAPIST

## 2023-11-06 NOTE — PROGRESS NOTES
PHYSICAL THERAPY EVALUATION  Type of Visit: Evaluation    See electronic medical record for Abuse and Falls Screening details.    Subjective       Presenting condition or subjective complaint: Right knee pain  Patient reports R knee pain especially in weight bearing positions. He did receive a cortisone injection, but has experienced limited relief with it. As time has progressed his R back and hip have been bothering him more in addition to his R knee. Pt is concerned about his R knee and the other symptoms because he is going to [facility] in a few weeks- (leaving Nov 23rd of Oct and December 5th). He would like to be able to walk and enjoy this trip without so much discomfort.   Symptoms worse: stairs, uneven surfaces, twisting and deep squat, sleeping on stomach  Alleviates symptoms: hot water bath, resting  He endorses wearing good footwear wears insoles- hiking shoe. Pt reports that driving can be aggravating to his R knee symptoms to the point where he occasionally has to reposition his leg.   Date of onset: 10/17/23    Relevant medical history: Sleep disorder like apnea   Dates & types of surgery:      Prior diagnostic imaging/testing results: X-ray     Prior therapy history for the same diagnosis, illness or injury: No      Prior Level of Function  Transfers: Independent  Ambulation: Independent  ADL: Independent  IADL: Driving, Meal preparation, Yard work, House tasks    Living Environment  Social support: With a significant other or spouse   Type of home: House   Stairs to enter the home: Yes 6 Is there a railing: Yes   Ramp: No   Stairs inside the home: No       Help at home: None  Equipment owned:       Employment: No    Hobbies/Interests:      Patient goals for therapy: walk normal without pain    Pain assessment: Pain present in R knee and R lateral hip/low back     Objective   KNEE EVALUATION  PAIN: R knee pain at rest 4/10 worse 8-9/10  GAIT:  Assistive Device(s): None  Gait Deviations:  R hip hiking,  and decreased R LE stance time resulting in decreased R swing step length due to discomfort and compensated movement  BALANCE/PROPRIOCEPTION:  decreased on R LE compared to L due to discomfort reported  WEIGHTBEARING ALIGNMENT:  Weight shift noted in standing to L side to off load the R knee  ROM: AROM WFL for B knees   STRENGTH:   Pain: - none + mild ++ moderate +++ severe  Strength Scale: 0-5/5 Left Right   Knee Flexion 5 4+   Knee Extension 5 5-   Quad Set 5- 4+     FLEXIBILITY:  decreased flexibility of B hamstrings and gastroc muscles  FUNCTIONAL TESTS: Double Leg Squat: Anterior knee translation, Increased trunk lean, Improper use of glutes/hips, Impaired weight distribution, and reports discomfort  PALPATION:   + Tenderness At Location Right Side Left Side*   Medial Joint Line +    Lateral Joint Line     Patellar Tendon     IT Band     Incisional     Popliteal     Biceps Femoris     Semitendinosis +    Semimembranosis     Glut Medius +    Patellar Medial +    Patellar Lateral -    Patellar Superior -    Patellar Inferior  +    *L knee unremarkable no reported TTP  JOINT MOBILITY:     Assessment & Plan   CLINICAL IMPRESSIONS  Medical Diagnosis: OA of R knee    Treatment Diagnosis: R knee pain due to OA (resulting gait compensations)   Impression/Assessment: Patient is a 68 year old male with R knee, hip, and LBP complaints.  The following significant findings have been identified: Pain, Decreased strength, Impaired gait, Decreased activity tolerance, and Impaired posture. These impairments interfere with their ability to perform self care tasks, work tasks, recreational activities, household chores, driving , household mobility, and community mobility as compared to previous level of function.     Clinical Decision Making (Complexity):  Clinical Presentation: Stable/Uncomplicated  Clinical Presentation Rationale: based on medical and personal factors listed in PT evaluation  Clinical Decision Making  (Complexity): Low complexity    PLAN OF CARE  Treatment Interventions:  Interventions: Gait Training, Manual Therapy, Neuromuscular Re-education, Therapeutic Activity, Therapeutic Exercise    Long Term Goals     PT Goal 1  Goal Identifier: HEP  Goal Description: Patient will complete his HEP consistently and indpendently to assist in return to functional tasks with decreased discomfort.  PT Goal 2  Goal Identifier: Standing  Goal Description: Patient will tolerate standing without a break for at least 1 hour in order to go on vacation and  line without aggravating symptoms.  Rationale: to maximize safety and independence with performance of ADLs and functional tasks;to maximize safety and independence within the home;to maximize safety and independence within the community;to maximize safety and independence with transportation  Target Date: 12/04/23  PT Goal 3  Goal Identifier: Stairs/Ladder  Goal Description: Patient will tolerating climbing/descending a full flight of stairs or a ladder without increased knee pain in order to improve his tolerance to functional acitivity.  Rationale: to maximize safety and independence within the community;to maximize safety and independence with transportation;to maximize safety and independence within the home  Target Date: 12/04/23      Frequency of Treatment: 1x per week  Duration of Treatment: 6 weeks    Recommended Referrals to Other Professionals:   Education Assessment:   Learner/Method: Patient;Listening;Demonstration;Pictures/Video  Education Comments: establish PT POC and HEP    Risks and benefits of evaluation/treatment have been explained.   Patient/Family/caregiver agrees with Plan of Care.     Evaluation Time:     PT Eval, Low Complexity Minutes (04693): 25  Signing Clinician: May Winters, PT      [facility]                                                                                   OUTPATIENT PHYSICAL  THERAPY      PLAN OF TREATMENT FOR OUTPATIENT REHABILITATION   Patient's Last Name, First Name, Werner Alvarado    YOB: 1955   Provider's Name   [facility]   Medical Record No.  [id number]     Onset Date: 10/17/23  Start of Care Date: 11/06/23     Medical Diagnosis:  OA of R knee      PT Treatment Diagnosis:  R knee pain due to OA (resulting gait compensations) Plan of Treatment  Frequency/Duration: 1x per week/ 6 weeks    Certification date from 11/06/23 to 12/18/23         See note for plan of treatment details and functional goals     May Winters, PT                         I CERTIFY THE NEED FOR THESE SERVICES FURNISHED UNDER        THIS PLAN OF TREATMENT AND WHILE UNDER MY CARE     (Physician attestation of this document indicates review and certification of the therapy plan).                Referring Provider:  Anupam Brock      Initial Assessment  See Epic Evaluation- Start of Care Date: 11/06/23

## 2023-11-08 NOTE — PROGRESS NOTES
[facility] Care Coordination Contact    No longer active with [facility] community case management effective 06/30/23. Left message for member on 10/2/23 and 10/31/23.  Reason for community disenrollment: Other:  MA still active but no pre-paid health plan.    Denise Yost RN  [facility]  [phone]

## 2023-11-13 ENCOUNTER — MYC MEDICAL ADVICE (OUTPATIENT)
Dept: INTERNAL MEDICINE | Facility: CLINIC | Age: 68
End: 2023-11-13

## 2023-11-13 ENCOUNTER — THERAPY VISIT (OUTPATIENT)
Dept: PHYSICAL THERAPY | Facility: CLINIC | Age: 68
End: 2023-11-13
Payer: COMMERCIAL

## 2023-11-13 DIAGNOSIS — M17.11 PRIMARY OSTEOARTHRITIS OF RIGHT KNEE: Primary | ICD-10-CM

## 2023-11-13 DIAGNOSIS — M62.838 MUSCLE SPASM: Primary | ICD-10-CM

## 2023-11-13 PROCEDURE — 97110 THERAPEUTIC EXERCISES: CPT | Mod: GP | Performed by: PHYSICAL THERAPIST

## 2023-11-13 PROCEDURE — 97140 MANUAL THERAPY 1/> REGIONS: CPT | Mod: GP | Performed by: PHYSICAL THERAPIST

## 2023-11-13 PROCEDURE — 97116 GAIT TRAINING THERAPY: CPT | Mod: GP | Performed by: PHYSICAL THERAPIST

## 2023-11-17 RX ORDER — CYCLOBENZAPRINE HCL 5 MG
5-10 TABLET ORAL 3 TIMES DAILY PRN
Qty: 50 TABLET | Refills: 0 | Status: SHIPPED | OUTPATIENT
Start: 2023-11-17

## 2023-11-17 NOTE — TELEPHONE ENCOUNTER
Dr. Seo,    Please see [facility]u message and advise.   Would you like a VV scheduled to discuss potential muscle relaxant therapy?    Thank you,  Susan Colby RN    
Please see  message and advise.     Evisit needed?   
12-Aug-2017

## 2023-11-20 ENCOUNTER — THERAPY VISIT (OUTPATIENT)
Dept: PHYSICAL THERAPY | Facility: CLINIC | Age: 68
End: 2023-11-20
Payer: COMMERCIAL

## 2023-11-20 DIAGNOSIS — M17.11 PRIMARY OSTEOARTHRITIS OF RIGHT KNEE: Primary | ICD-10-CM

## 2023-11-20 PROCEDURE — 97116 GAIT TRAINING THERAPY: CPT | Mod: GP | Performed by: PHYSICAL THERAPIST

## 2023-11-20 PROCEDURE — 97110 THERAPEUTIC EXERCISES: CPT | Mod: GP | Performed by: PHYSICAL THERAPIST

## 2023-12-11 ENCOUNTER — THERAPY VISIT (OUTPATIENT)
Dept: PHYSICAL THERAPY | Facility: CLINIC | Age: 68
End: 2023-12-11
Payer: COMMERCIAL

## 2023-12-11 DIAGNOSIS — M17.11 PRIMARY OSTEOARTHRITIS OF RIGHT KNEE: Primary | ICD-10-CM

## 2023-12-11 PROCEDURE — 97112 NEUROMUSCULAR REEDUCATION: CPT | Mod: GP | Performed by: PHYSICAL THERAPIST

## 2023-12-11 PROCEDURE — 97110 THERAPEUTIC EXERCISES: CPT | Mod: GP | Performed by: PHYSICAL THERAPIST

## 2023-12-13 ENCOUNTER — OFFICE VISIT (OUTPATIENT)
Dept: INTERNAL MEDICINE | Facility: CLINIC | Age: 68
End: 2023-12-13
Payer: COMMERCIAL

## 2023-12-13 VITALS
SYSTOLIC BLOOD PRESSURE: 116 MMHG | DIASTOLIC BLOOD PRESSURE: 74 MMHG | OXYGEN SATURATION: 97 % | BODY MASS INDEX: 32.14 KG/M2 | HEART RATE: 83 BPM | HEIGHT: 70 IN | WEIGHT: 224.5 LBS | TEMPERATURE: 97.8 F

## 2023-12-13 DIAGNOSIS — R91.8 PULMONARY NODULES: Chronic | ICD-10-CM

## 2023-12-13 DIAGNOSIS — Z00.00 ENCOUNTER FOR MEDICARE ANNUAL WELLNESS EXAM: Primary | ICD-10-CM

## 2023-12-13 DIAGNOSIS — Z13.6 CARDIOVASCULAR SCREENING; LDL GOAL LESS THAN 130: ICD-10-CM

## 2023-12-13 DIAGNOSIS — N52.9 ERECTILE DYSFUNCTION, UNSPECIFIED ERECTILE DYSFUNCTION TYPE: ICD-10-CM

## 2023-12-13 DIAGNOSIS — K76.0 FATTY INFILTRATION OF LIVER: ICD-10-CM

## 2023-12-13 DIAGNOSIS — Z12.5 SCREENING FOR PROSTATE CANCER: ICD-10-CM

## 2023-12-13 DIAGNOSIS — E66.01 SEVERE OBESITY (BMI 35.0-35.9 WITH COMORBIDITY) (H): ICD-10-CM

## 2023-12-13 DIAGNOSIS — G47.33 OBSTRUCTIVE SLEEP APNEA ON CPAP: ICD-10-CM

## 2023-12-13 DIAGNOSIS — M17.11 PRIMARY OSTEOARTHRITIS OF RIGHT KNEE: ICD-10-CM

## 2023-12-13 LAB
ALT SERPL W P-5'-P-CCNC: 33 U/L (ref 0–70)
ANION GAP SERPL CALCULATED.3IONS-SCNC: 9 MMOL/L (ref 7–15)
AST SERPL W P-5'-P-CCNC: 24 U/L (ref 0–45)
BUN SERPL-MCNC: 12.8 MG/DL (ref 8–23)
CALCIUM SERPL-MCNC: 9.7 MG/DL (ref 8.8–10.2)
CHLORIDE SERPL-SCNC: 104 MMOL/L (ref 98–107)
CHOLEST SERPL-MCNC: 157 MG/DL
CREAT SERPL-MCNC: 0.87 MG/DL (ref 0.67–1.17)
DEPRECATED HCO3 PLAS-SCNC: 28 MMOL/L (ref 22–29)
EGFRCR SERPLBLD CKD-EPI 2021: >90 ML/MIN/1.73M2
ERYTHROCYTE [DISTWIDTH] IN BLOOD BY AUTOMATED COUNT: 13.5 % (ref 10–15)
FASTING STATUS PATIENT QL REPORTED: YES
GLUCOSE SERPL-MCNC: 99 MG/DL (ref 70–99)
HCT VFR BLD AUTO: 47.6 % (ref 40–53)
HDLC SERPL-MCNC: 58 MG/DL
HGB BLD-MCNC: 15.8 G/DL (ref 13.3–17.7)
LDLC SERPL CALC-MCNC: 87 MG/DL
MCH RBC QN AUTO: 30.9 PG (ref 26.5–33)
MCHC RBC AUTO-ENTMCNC: 33.2 G/DL (ref 31.5–36.5)
MCV RBC AUTO: 93 FL (ref 78–100)
NONHDLC SERPL-MCNC: 99 MG/DL
PLATELET # BLD AUTO: 254 10E3/UL (ref 150–450)
POTASSIUM SERPL-SCNC: 5 MMOL/L (ref 3.4–5.3)
PSA SERPL DL<=0.01 NG/ML-MCNC: 2.51 NG/ML (ref 0–4.5)
RBC # BLD AUTO: 5.12 10E6/UL (ref 4.4–5.9)
SODIUM SERPL-SCNC: 141 MMOL/L (ref 135–145)
TRIGL SERPL-MCNC: 60 MG/DL
WBC # BLD AUTO: 6.7 10E3/UL (ref 4–11)

## 2023-12-13 PROCEDURE — 80061 LIPID PANEL: CPT | Performed by: INTERNAL MEDICINE

## 2023-12-13 PROCEDURE — G0439 PPPS, SUBSEQ VISIT: HCPCS | Performed by: INTERNAL MEDICINE

## 2023-12-13 PROCEDURE — 80048 BASIC METABOLIC PNL TOTAL CA: CPT | Performed by: INTERNAL MEDICINE

## 2023-12-13 PROCEDURE — 99214 OFFICE O/P EST MOD 30 MIN: CPT | Mod: 25 | Performed by: INTERNAL MEDICINE

## 2023-12-13 PROCEDURE — 84450 TRANSFERASE (AST) (SGOT): CPT | Performed by: INTERNAL MEDICINE

## 2023-12-13 PROCEDURE — G0103 PSA SCREENING: HCPCS | Performed by: INTERNAL MEDICINE

## 2023-12-13 PROCEDURE — 36415 COLL VENOUS BLD VENIPUNCTURE: CPT | Performed by: INTERNAL MEDICINE

## 2023-12-13 PROCEDURE — 84460 ALANINE AMINO (ALT) (SGPT): CPT | Performed by: INTERNAL MEDICINE

## 2023-12-13 PROCEDURE — 85027 COMPLETE CBC AUTOMATED: CPT | Performed by: INTERNAL MEDICINE

## 2023-12-13 RX ORDER — TADALAFIL 20 MG/1
TABLET ORAL
Qty: 30 TABLET | Refills: 5 | Status: SHIPPED | OUTPATIENT
Start: 2023-12-13

## 2023-12-13 ASSESSMENT — ENCOUNTER SYMPTOMS
CHILLS: 0
FREQUENCY: 0
SHORTNESS OF BREATH: 0
HEMATURIA: 0
CONSTIPATION: 0
NAUSEA: 0
FEVER: 0
EYE PAIN: 0
SORE THROAT: 0
NERVOUS/ANXIOUS: 0
HEADACHES: 0
JOINT SWELLING: 0
DIZZINESS: 0
ARTHRALGIAS: 0
ABDOMINAL PAIN: 0
PARESTHESIAS: 0
DYSURIA: 0
HEMATOCHEZIA: 0
HEARTBURN: 0
COUGH: 0
PALPITATIONS: 0
MYALGIAS: 0
DIARRHEA: 0
WEAKNESS: 0

## 2023-12-13 ASSESSMENT — PAIN SCALES - GENERAL: PAINLEVEL: NO PAIN (0)

## 2023-12-13 ASSESSMENT — ACTIVITIES OF DAILY LIVING (ADL): CURRENT_FUNCTION: NO ASSISTANCE NEEDED

## 2023-12-13 NOTE — PROGRESS NOTES
"He is at risk for lack of exercise and has been provided with information to increase physical activity for the benefit of his well-being.  The patient was counseled and encouraged to consider modifying their diet and eating habits. He was provided with information on recommended healthy diet options.      Answers submitted by the patient for this visit:  Annual Preventive Visit (Submitted on 12/13/2023)  Chief Complaint: Annual Exam:  In general, how would you rate your overall physical health?: good  Frequency of exercise:: 1 day/week  Do you usually eat at least 4 servings of fruit and vegetables a day, include whole grains & fiber, and avoid regularly eating high fat or \"junk\" foods? : No  Taking medications regularly:: Yes  Medication side effects:: Not applicable  Activities of Daily Living: no assistance needed  Home safety: no safety concerns identified  Hearing Impairment:: no hearing concerns  In the past 6 months, have you been bothered by leaking of urine?: No  abdominal pain: No  Blood in stool: No  Blood in urine: No  chest pain: No  chills: No  congestion: No  constipation: No  cough: No  diarrhea: No  dizziness: No  ear pain: No  eye pain: No  nervous/anxious: No  fever: No  frequency: No  genital sores: No  headaches: No  hearing loss: No  heartburn: No  arthralgias: No  joint swelling: No  peripheral edema: No  mood changes: No  myalgias: No  nausea: No  dysuria: No  palpitations: No  Skin sensation changes: No  sore throat: No  urgency: No  rash: No  shortness of breath: No  visual disturbance: No  weakness: No  impotence: Yes  penile discharge: No  In general, how would you rate your overall mental or emotional health?: excellent  Additional concerns today:: No  Exercise outside of work (Submitted on 12/13/2023)  Chief Complaint: Annual Exam:  Duration of exercise:: Less than 15 minutes    "

## 2023-12-13 NOTE — PROGRESS NOTES
"SUBJECTIVE:   Werner is a 68 year old, presenting for the following:  Medicare Visit        12/13/2023     7:16 AM   Additional Questions   Roomed by Maira COULTER CMA       Are you in the first 12 months of your Medicare coverage?  No    Healthy Habits:     In general, how would you rate your overall health?  Good    Frequency of exercise:  1 day/week    Duration of exercise:  Less than 15 minutes    Do you usually eat at least 4 servings of fruit and vegetables a day, include whole grains    & fiber and avoid regularly eating high fat or \"junk\" foods?  No    Taking medications regularly:  Yes    Barriers to taking medications:  None    Medication side effects:  Not applicable    Ability to successfully perform activities of daily living:  No assistance needed    Home Safety:  No safety concerns identified    Hearing Impairment:  No hearing concerns    In the past 6 months, have you been bothered by leaking of urine?  No    In general, how would you rate your overall mental or emotional health?  Excellent    Additional concerns today:  No    Today's PHQ-2 Score:       12/13/2023     7:09 AM   PHQ-2 ( 1999 [employer])   Q1: Little interest or pleasure in doing things 0   Q2: Feeling down, depressed or hopeless 0   PHQ-2 Score 0   Q1: Little interest or pleasure in doing things Not at all   Q2: Feeling down, depressed or hopeless Not at all   PHQ-2 Score 0     Have you ever done Advance Care Planning? (For example, a Health Directive, POLST, or a discussion with a medical provider or your loved ones about your wishes): No, advance care planning information given to patient to review.  Patient plans to discuss their wishes with loved ones or provider.        Fall risk  Fallen 2 or more times in the past year?: No  Any fall with injury in the past year?: No    Cognitive Screening   1) Repeat 3 items (Leader, Season, Table)    2) Clock draw: NORMAL  3) 3 item recall: Recalls 3 objects  Results: 3 items recalled: COGNITIVE " IMPAIRMENT LESS LIKELY    Mini-CogTM Copyright DI Yuan. Licensed by the author for use in [facility]; reprinted with permission (kyle@.[facility]). All rights reserved.      Do you have sleep apnea, excessive snoring or daytime drowsiness? : yes GREGORY on C-PAP    Reviewed and updated as needed this visit by clinical staff   Tobacco  Allergies  Meds  Problems    Fam Hx          Reviewed and updated as needed this visit by Provider    Allergies  Meds  Problems    Fam Hx         Social History     Tobacco Use     Smoking status: Former     Packs/day: 1.00     Years: 15.00     Additional pack years: 0.00     Total pack years: 15.00     Types: Cigarettes     Quit date: 2014     Years since quittin.3     Smokeless tobacco: Never   Substance Use Topics     Alcohol use: Yes     Comment: Socially         2023     7:09 AM   Alcohol Use   Prescreen: >3 drinks/day or >7 drinks/week? No     Do you have a current opioid prescription? No  Do you use any other controlled substances or medications that are not prescribed by a provider? Alcohol      1.)  Has known obstructive sleep apnea.  Has been prescribed CPAP, uses it almost every night.  They feel it helps, and generally awakens feeling relatively refreshed, no daytime somnolence.     2.  Recent visits with sports medicine to evaluate and treat degenerative joint disease.  Reviewed the knee x-ray showing mild to moderate degenerative joint sees right knee.  Received steroid injection which had some effect but not as much as the previous one in the left.  He is working on stretching and strengthening of the muscles to support the knee.    3.  The patient has had a history of ongoing obesity.  Reviewed the weigth curves.   Their current BMI is:  Body mass index is 32.21 kg/m .  Reviewed previous attempts at weight loss which have not been successful in producing prolonged weigth loss.   Discussed current eating and exercise habits.     Reviewed  weights in chart:  Wt Readings from Last 10 Encounters:   12/13/23 101.8 kg (224 lb 8 oz)   10/17/23 106.1 kg (234 lb)   03/16/23 107.5 kg (237 lb)   08/15/22 113.9 kg (251 lb)   06/01/22 113.4 kg (250 lb)   04/13/22 113.4 kg (250 lb)   08/11/21 110.7 kg (244 lb)   05/06/21 108.9 kg (240 lb)   08/05/20 117 kg (258 lb)   05/19/20 106.6 kg (235 lb)        4.  Taking Tadalafil (generic Cialis) as needed for erectile dysfunction.  He has not experienced any significant side effects of this medication.  It does work well for him, he would like to continue on it.      Current providers sharing in care for this patient include:   Patient Care Team:  Barron Seo MD as PCP - General (Internal Medicine)  Barron Seo MD as Assigned PCP  Renetta Beasley PA-C as Physician Assistant (Dermatology)  Renetta Beasley PA-C as Assigned Surgical Provider  Prosper Link MD as Assigned Sleep Provider  Anupam Brock DO as Assigned Musculoskeletal Provider    The following health maintenance items are reviewed in Epic and correct as of today:  Health Maintenance   Topic Date Due     LUNG CANCER SCREENING  Never done     RSV VACCINE (Pregnancy & 60+) (1 - 1-dose 60+ series) Never done     MEDICARE ANNUAL WELLNESS VISIT  12/13/2024     ANNUAL REVIEW OF HM ORDERS  12/13/2024     FALL RISK ASSESSMENT  12/13/2024     COLORECTAL CANCER SCREENING  01/01/2025     LIPID  08/15/2027     ADVANCE CARE PLANNING  12/13/2028     DTAP/TDAP/TD IMMUNIZATION (3 - Td or Tdap) 08/15/2032     HEPATITIS C SCREENING  Completed     PHQ-2 (once per calendar year)  Completed     INFLUENZA VACCINE  Completed     Pneumococcal Vaccine: 65+ Years  Completed     ZOSTER IMMUNIZATION  Completed     AORTIC ANEURYSM SCREENING (SYSTEM ASSIGNED)  Completed     COVID-19 Vaccine  Completed     IPV IMMUNIZATION  Aged Out     HPV IMMUNIZATION  Aged Out     MENINGITIS IMMUNIZATION  Aged Out     RSV MONOCLONAL ANTIBODY  Aged Out               Review  "of Systems   Constitutional:  Negative for chills and fever.   HENT:  Negative for congestion, ear pain, hearing loss and sore throat.    Eyes:  Negative for pain and visual disturbance.   Respiratory:  Negative for cough and shortness of breath.    Cardiovascular:  Negative for chest pain, palpitations and peripheral edema.   Gastrointestinal:  Negative for abdominal pain, constipation, diarrhea, heartburn, hematochezia and nausea.   Genitourinary:  Positive for impotence. Negative for dysuria, frequency, genital sores, hematuria, penile discharge and urgency.   Musculoskeletal:  Negative for arthralgias, joint swelling and myalgias.   Skin:  Negative for rash.   Neurological:  Negative for dizziness, weakness, headaches and paresthesias.   Psychiatric/Behavioral:  Negative for mood changes. The patient is not nervous/anxious.      Constitutional, HEENT, cardiovascular, pulmonary, gi and gu systems are negative, except as otherwise noted.    OBJECTIVE:   /74   Pulse 83   Temp 97.8  F (36.6  C) (Tympanic)   Ht 1.778 m (5' 10\")   Wt 101.8 kg (224 lb 8 oz)   SpO2 97%   BMI 32.21 kg/m   Estimated body mass index is 32.21 kg/m  as calculated from the following:    Height as of this encounter: 1.778 m (5' 10\").    Weight as of this encounter: 101.8 kg (224 lb 8 oz).  Physical Exam  GENERAL alert and no distress  EYES:  Normal sclera,conjunctiva, EOMI  HENT: oral and posterior pharynx without lesions or erythema, facies symmetric  NECK: Neck supple. No LAD, without thyroidmegaly.  RESP: Clear to ausculation bilaterally without wheezes or crackles. Normal BS in all fields.  CV: RRR normal S1S2 without murmurs, rubs or gallops.  LYMPH: no cervical lymph adenopathy appreciated  MS: extremities- no gross deformities of the visible extremities noted,   EXT:  no lower extremity edema  PSYCH: Alert and oriented times 3; speech- coherent  SKIN:  No obvious significant skin lesions on visible portions of face "     Diagnostic Test Results:  Labs reviewed in Epic    ASSESSMENT / PLAN:     (Z00.00) Encounter for Medicare annual wellness exam  (primary encounter diagnosis)  Comment: Discussed cardiac disease risk factors and cardiac disease risk factor modification, including diabetes screening, blood pressure screening (and management if indicated), and cholesterol screening.   Reviewed immunzation guidelines, including pneumococcal vaccines, annual influenza, and shingles vaccines.   Discussed routine cancer screenings, including skin cancer, colon cancer screening for everyone until age 80, prostate cancer screening in men until age 75, mammogram and PAP/pelvic for women until age 75.   Recommended regular dentist visits to care for remaining teeth.   Recommended regular screening for vision and glaucoma.   Recommended safe driving and accident avoidance.   Plan: REVIEW OF HEALTH MAINTENANCE PROTOCOL ORDERS,         PRIMARY CARE FOLLOW-UP SCHEDULING            (E66.01,  Z68.35) Severe obesity (BMI 35.0-35.9 with comorbidity) (H)  Comment: Nice improvement in his weight over the past year with diet lifestyle changes, specifically focusing on lower carbohydrate diet.  Obesity is contributing to obstructive sleep apnea.  Current BMI is: Body mass index is 32.21 kg/m ..  Reviewed weight patterns.   Obesity as a biological preventable and treatable disease, which is associated with significantly increased risk of many acute and chronic health conditions.   Obesity has now been recognized as a chronic disease by the American Medical Association.  Obesity has serious co-morbidities associated with obesity including increased risk for hypertension, stroke, coronary artery disease, dyslipidemia, Type II diabetes, depression, sleep apnea, cancers of the colon, breast and endometrium, obstructive sleep apnea, osteoarthritis and female infertility.  Recommended regular aerobic exercise, recommended improved diet aiming at lowering  amount of processed foods, lower sugars, moderation/reduction of simple carbs and fat in the diet, establishing more regular meal times, always eating breakfast, front loading some of the calories and adding more protein to the diet.     Referral to Weight Loss Clinic for discussion of more aggressive measures for weight loss can be considered (including medical options (e.g. GLP-1, or appetite suppressants, etc.) or bariatric surgical procedures.   Plan: REVIEW OF HEALTH MAINTENANCE PROTOCOL ORDERS,         PRIMARY CARE FOLLOW-UP SCHEDULING, Basic         metabolic panel, CBC with platelets, AST, ALT,         Lipid panel reflex to direct LDL Fasting            (M17.11) Primary osteoarthritis of right knee  Comment: Reviewed the issues of degenerative joint disease.  He is improved with physical therapy and the steroid injection.  Plan: PRIMARY CARE FOLLOW-UP SCHEDULING            (K76.0) Fatty infiltration of liver  Comment: Incidental finding on previous CT scans.  Treatment will remain losing weight through lower carbohydrate diet.  Continue to monitor liver test.  Avoid excessive alcohol.  Plan: REVIEW OF HEALTH MAINTENANCE PROTOCOL ORDERS,         PRIMARY CARE FOLLOW-UP SCHEDULING, AST, ALT            (G47.33) Obstructive sleep apnea on CPAP  Comment: This condition is currently controlled on the current medical regimen.  Continue current therapy.   Plan: REVIEW OF HEALTH MAINTENANCE PROTOCOL ORDERS,         PRIMARY CARE FOLLOW-UP SCHEDULING            (N52.9) Erectile dysfunction, unspecified erectile dysfunction type  Comment: This condition is currently controlled on the current medical regimen.  Continue current therapy.   He has not experienced any significant side effects of this medication.   Plan: REVIEW OF HEALTH MAINTENANCE PROTOCOL ORDERS,         PRIMARY CARE FOLLOW-UP SCHEDULING, tadalafil         (CIALIS) 20 MG tablet            (Z12.5) Screening for prostate cancer  Comment: Discussed prostate  cancer screening and PSA blood test.  Discussed that an elevated PSA blood test can be caused by things other than prostate cancer, including infection/inflammation, BPH, and recent sexual activity; and that elevated PSA blood test may require further investigation with a urologist   Plan: REVIEW OF HEALTH MAINTENANCE PROTOCOL ORDERS,         PRIMARY CARE FOLLOW-UP SCHEDULING, PSA, screen            (Z13.6) CARDIOVASCULAR SCREENING; LDL GOAL LESS THAN 130  Comment: Discussed cardiac disease risk factors and cardiac disease risk factor modification.   Plan: REVIEW OF HEALTH MAINTENANCE PROTOCOL ORDERS,         PRIMARY CARE FOLLOW-UP SCHEDULING, Basic         metabolic panel, CBC with platelets, AST, ALT,         Lipid panel reflex to direct LDL Fasting            (R91.8) Pulmonary nodules  Comment: Previous history of smoking (no more than 15 pack years).  He quit smoking 10 years ago.  Previous lung nodules were seen and had serial CT scans of the chest demonstrating stable benign nodules.  The patient does not have enough smoking history to qualify for annual CT screening for lung cancer.  Plan:        Patient has been advised of split billing requirements and indicates understanding: Yes      COUNSELING:  Reviewed preventive health counseling, as reflected in patient instructions       Regular exercise       Healthy diet/nutrition       Vision screening       Hearing screening       Dental care       Bladder control       Fall risk prevention       Immunizations  up to date  RSV vaccines are now available.  The will help provide protection against respiratory syncytial virus (RSV), a common upper respiratory virus that is known to cause severe inflammation in the airways.  This vaccine is recommended for adults over age 60, especially those with high risk conditions and/or chronic respiratory illnesses such as asthma or COPD.  This vaccine is available at most pharmacies and clinics.    If you are on Medicare  "insurance, get this vaccine from a pharmacist in a pharmacy for the best cost and to confirm coverage, it will be less expensive to get this there rather than from our clinic.              Colon cancer screening next due 2025, will review colon cancer screening options at that time (colonoscopy, Cologuard, fit test, etc.).       Prostate cancer screening      BMI:   Estimated body mass index is 32.21 kg/m  as calculated from the following:    Height as of this encounter: 1.778 m (5' 10\").    Weight as of this encounter: 101.8 kg (224 lb 8 oz).         He reports that he quit smoking about 9 years ago. His smoking use included cigarettes. He has a 15.00 pack-year smoking history. He has never used smokeless tobacco.      Appropriate preventive services were discussed with this patient, including applicable screening as appropriate for fall prevention, nutrition, physical activity, Tobacco-use cessation, weight loss and cognition.  Checklist reviewing preventive services available has been given to the patient.    Reviewed patients plan of care and provided an AVS. The  for Werner meets the Care Plan requirement. This Care Plan has been established and reviewed with the .          Barron Seo MD  [facility]    Identified Health Risks:    "

## 2023-12-13 NOTE — PATIENT INSTRUCTIONS
"  We are rechecking your labs.  I will let you know if the results indicate any changes in your therapy.  Unless you hear otherwise, continue all medications at the same doses.  Contact your usual pharmacy if you need refills.     Assuming your labs look good, then continue all medications at the same doses.  Contact your usual pharmacy if you need refills.      Continue all medications at the same doses.  Contact your usual pharmacy if you need refills.       5 GOALS TO PREVENT VASCULAR DISEASE:     1.  Aggressive blood pressure control, under 130/80 ideally.  Using medications if needed.    Your blood pressure is under good control    BP Readings from Last 4 Encounters:   12/13/23 116/74   10/17/23 132/80   03/16/23 121/80   08/15/22 128/78       2.  Aggressive LDL cholesterol (\"bad cholesterol\") lowering as indicated.    Your goal is an LDL under 130 for sure, preferably under 100.  (If you have diabetes or previous vascular disease, the the LDL goals would be under 100 for sure, preferably under 70.)    New guidelines identify four high-risk groups who could benefit from statins:   *people with pre-existing heart disease, such as those who have had a heart attack;   *people ages 40 to 75 who have diabetes of any type  *patients ages 40 to 75 with at least a 7.5% risk of developing cardiovascular disease over the next decade, according to a formula described in the guidelines  *patients with the sort of super-high cholesterol that sometimes runs in families, as evidenced by an LDL of 190 milligrams per deciliter or higher    Your cholesterol levels are well controlled.    Recent Labs   Lab Test 08/15/22  1026 08/11/21  0920   CHOL 203* 192   HDL 61 64   * 98   TRIG 104 151*       3.  Aggressive diabetic prevention, screening and/or management.      You do not have diabetes as of the most recent blood tests.     4.  No smoking    5.  Consider daily preventative aspirin over age 50 if you have enough cardiac " risk factors to place you at higher risk for the presence of vascular disease.    If you have any reason not to take aspirin such easy bruising or bleeding, stomach problems, other anticoagulant medications, or any other side effects, then you should not take Aspirin.     --Based on your current cardiac risk factor profile, you should take regular daily Aspirin 81 mg once per day (as long as you do not have any side effects from taking aspirin).             Preventive Health Recommendations:   Male Ages 65 - 75    Yearly exam:             See your health care provider every year in order to  o   Review health changes.   o   Discuss preventive care.    o   Review your medicines if your doctor has prescribed any.  Talk with your health care provider about whether you should have a test to screen for prostate cancer (PSA).  Every 3 years, have a diabetes test (fasting glucose). If you are at risk for diabetes, you should have this test more often.  At least Every 5 years, have a cholesterol test. Have this test more often if you have medical conditions that place you at higher risk for high cholesterol or heart disease.   Regular colon cancer screening starting age 45 with either a colonoscopy, or stool test (either Cologuard every 3 years or yearly FIT stool test from ).  The intervals for colon cancer screening will be determined by family history and prior colon cancer screening results.   Routine prostate cancer screening with a PSA blood test every 1-2 years.   While the PSA blood test is not a direct cancer screening blood test, it detects inflammation within in the prostate, which could indicate possible cancer.  If the test is abnormal, you do not necessarily have prostate cancer, but would need further evaluation.    Talk to with your health care provider about screening for Abdominal Aortic Aneurysm if you have a family history of AAA or have a history of smoking.    Shots:   Get a flu shot each year.  "  Covid vaccines are now recommended annually.  Get the most updated Covid vaccine when it becomes available, consider getting this at the same time as the annual influenza vaccine.   Get a tetanus shot every 10 years.  Everyone over 65 should make sure to receive pneumonia vaccines to prevent the most common type of bacterial pneumonia: Pneumococcal pneumonia. There are now two you should receive - Pneumovax (PPSV 23) and Prevnar (PCV 20)  Strongly consider the Shingrix shingles vaccine to give you the best chance of avoiding future shingles infection (as many as 1 and 3 adults over age 50 may develop this condition in their lifetime).    If you have Medicare insurance, investigate the cost and coverage for Shingrix shingles vaccines with a pharmacist at a pharmacy.  They can tell you the coverage and cost and then give it to you if the price is acceptable.    Medicare sometimes does not cover these Shingrix shingles vaccines, and with Medicare insurance it is usually cheaper to receive this shingles vaccine from a pharmacist in a pharmacy rather than in our clinic.    At this time, you only need the 2 Shingrix vaccines and then you are done.     Nutrition:   Eat at least 5 servings of fruits and vegetables each day.   Eat whole-grain bread, whole-wheat pasta and brown rice instead of white grains and rice.   Talk to your provider about Calcium and Vitamin D.      --Good Grains:  Oats, brown rice, Quinoa (these do not raise the blood sugar as much)     --Bad grains:  Anything made from wheat or white rice     (because these raise the blood sugars significantly, and the possible gluten issue from wheat for some people).      --Proteins:  Aim for \"lean proteins\" including chicken, fish, seafood, pork, turkey, and eggs (in moderation); Eat red meat only occasionally    Lifestyle  Exercise for at least 150 minutes a week (30 minutes a day, 5 days a week). This will help you control your weight and prevent disease. " "  Limit alcohol to one drink per day.   No smoking.   Wear sunscreen to prevent skin cancer.   See your dentist every six months for an exam and cleaning.   See your eye doctor every 1 to 2 years to screen for conditions such as glaucoma, macular degeneration, cataracts, etc         Patient Education   Personalized Prevention Plan  You are due for the preventive services outlined below.  Your care team is available to assist you in scheduling these services.  If you have already completed any of these items, please share that information with your care team to update in your medical record.  Health Maintenance Due   Topic Date Due    LUNG CANCER SCREENING  Never done    RSV VACCINE (Pregnancy & 60+) (1 - 1-dose 60+ series) Never done    AORTIC ANEURYSM SCREENING (SYSTEM ASSIGNED)  Never done    Annual Wellness Visit  08/15/2023    ANNUAL REVIEW OF HM ORDERS  08/15/2023     Learning About Being Physically Active  What is physical activity?     Being physically active means doing any kind of activity that gets your body moving.  The types of physical activity that can help you get fit and stay healthy include:  Aerobic or \"cardio\" activities. These make your heart beat faster and make you breathe harder, such as brisk walking, riding a bike, or running. They strengthen your heart and lungs and build up your endurance.  Strength training activities. These make your muscles work against, or \"resist,\" something. Examples include lifting weights or doing push-ups. These activities help tone and strengthen your muscles and bones.  Stretches. These let you move your joints and muscles through their full range of motion. Stretching helps you be more flexible.  Reaching a balance between these three types of physical activity is important because each one contributes to your overall fitness.  What are the benefits of being active?  Being active is one of the best things you can do for your health. It helps you to:  Feel " "stronger and have more energy to do all the things you like to do.  Focus better at school or work.  Feel, think, and sleep better.  Reach and stay at a healthy weight.  Lose fat and build lean muscle.  Lower your risk for serious health problems, including diabetes, heart attack, high blood pressure, and some cancers.  Keep your heart, lungs, bones, muscles, and joints strong and healthy.  How can you make being active part of your life?  Start slowly. Make it your long-term goal to get at least 30 minutes of exercise on most days of the week. Walking is a good choice. You also may want to do other activities, such as running, swimming, cycling, or playing tennis or team sports.  Pick activities that you like--ones that make your heart beat faster, your muscles stronger, and your muscles and joints more flexible. If you find more than one thing you like doing, do them all. You don't have to do the same thing every day.  Get your heart pumping every day. Any activity that makes your heart beat faster and keeps it at that rate for a while counts.  Here are some great ways to get your heart beating faster:  Go for a brisk walk, run, or hike.  Go for a swim or bike ride.  Take an online exercise class or dance.  Play a game of touch football, basketball, or soccer.  Play tennis, pickleball, or racquetball.  Climb stairs.  Even some household chores can be aerobic. Just do them at a faster pace. Raking or mowing the lawn, sweeping the garage, and vacuuming and cleaning your home all can help get your heart rate up.  Strengthen your muscles during the week. You don't have to lift heavy weights or grow big, bulky muscles to get stronger. Doing a few simple activities that make your muscles work against, or \"resist,\" something can help you get stronger. Aim for at least twice a week.  For example, you can:  Do push-ups or sit-ups, which use your own body weight as resistance.  Lift weights or dumbbells or use stretch bands " "at home or in a gym or community center.  Stretch your muscles often. Stretching will help you as you become more active. It can help you stay flexible and loosen tight muscles. It can also help improve your balance and posture and can be a great way to relax.  Be sure to stretch the muscles you'll be using when you work out. It's best to warm your muscles slightly before you stretch them. Walk or do some other light aerobic activity for a few minutes. Then start stretching.  When you stretch your muscles:  Do it slowly. Stretching is not about going fast or making sudden movements.  Don't push or bounce during a stretch.  Hold each stretch for at least 15 to 30 seconds, if you can. You should feel a stretch in the muscle, but not pain.  Breathe out as you do the stretch. Then breathe in as you hold the stretch. Don't hold your breath.  If you're worried about how more activity might affect your health, have a checkup before you start. Follow any special advice your doctor gives you for getting a smart start.  Where can you learn more?  Go to https://www.[employer].net/patiented  Enter W332 in the search box to learn more about \"Learning About Being Physically Active.\"  Current as of: June 6, 2023               Content Version: 13.8    0040-1222 [employer].   Care instructions adapted under license by your healthcare professional. If you have questions about a medical condition or this instruction, always ask your healthcare professional. [employer] disclaims any warranty or liability for your use of this information.      Learning About Dietary Guidelines  What are the Dietary Guidelines for Americans?     Dietary Guidelines for Americans provide tips for eating well and staying healthy. This helps reduce the risk for long-term (chronic) diseases.  These guidelines recommend that you:  Eat and drink the right amount for you. The U.[place] government's food guide is called MyPlate. It can help " "you make your own well-balanced eating plan.  Try to balance your eating with your activity. This helps you stay at a healthy weight.  Drink alcohol in moderation, if at all.  Limit foods high in salt, saturated fat, trans fat, and added sugar.  These guidelines are from the U.S. Department of Agriculture and the U.S. Department of Health and Human Services. They are updated every 5 years.  What is MyPlate?  MyPlate is the U.S. government's food guide. It can help you make your own well-balanced eating plan. A balanced eating plan means that you eat enough, but not too much, and that your food gives you the nutrients you need to stay healthy.  MyPlate focuses on eating plenty of whole grains, fruits, and vegetables, and on limiting fat and sugar. It is available online at www.ChooseMyPlate.gov.  How can you get started?  If you're trying to eat healthier, you can slowly change your eating habits over time. You don't have to make big changes all at once. Start by adding one or two healthy foods to your meals each day.  Grains  Choose whole-grain breads and cereals and whole-wheat pasta and whole-grain crackers.  Vegetables  Eat a variety of vegetables every day. They have lots of nutrients and are part of a heart-healthy diet.  Fruits  Eat a variety of fruits every day. Fruits contain lots of nutrients. Choose fresh fruit instead of fruit juice.  Protein foods  Choose fish and lean poultry more often. Eat red meat and fried meats less often. Dried beans, tofu, and nuts are also good sources of protein.  Dairy  Choose low-fat or fat-free products from this food group. If you have problems digesting milk, try eating cheese or yogurt instead.  Fats and oils  Limit fats and oils if you're trying to cut calories. Choose healthy fats when you cook. These include canola oil and olive oil.  Where can you learn more?  Go to https://www.healthwise.net/patiented  Enter D676 in the search box to learn more about \"Learning About " "Dietary Guidelines.\"  Current as of: February 28, 2023               Content Version: 13.8    6760-0344 [employer].   Care instructions adapted under license by your healthcare professional. If you have questions about a medical condition or this instruction, always ask your healthcare professional. [employer] disclaims any warranty or liability for your use of this information.         "

## 2023-12-14 NOTE — PROGRESS NOTES
PT PROGRESS NOTE:    PLAN  Continue PT per updated POC. See details below and in flowsheet.Goals update    Beginning/End Dates of Progress Note Reporting Period:   11/6/23 to 12/11/2023      [facility]                                                                                   OUTPATIENT PHYSICAL THERAPY    PLAN OF TREATMENT FOR OUTPATIENT REHABILITATION   Patient's Last Name, First Name, Werner Alvarado    YOB: 1955   Provider's Name   [facility]   Medical Record No.  [id number]     Onset Date: 10/17/23  Start of Care Date: 11/06/23     Medical Diagnosis:  OA of R knee      PT Treatment Diagnosis:  R knee pain due to OA (resulting gait compensations) Plan of Treatment  Frequency/Duration: 1x per 2 weeks/ 6 weeks    Certification date from 12/18/23 to 01/22/24         See note for plan of treatment details and functional goals     May Winters, PT                         I CERTIFY THE NEED FOR THESE SERVICES FURNISHED UNDER        THIS PLAN OF TREATMENT AND WHILE UNDER MY CARE     (Physician attestation of this document indicates review and certification of the therapy plan).              Referring Provider:  Anupam Brock    Initial Assessment  See Epic Evaluation- Start of Care Date: 11/06/23             Self

## 2024-02-07 NOTE — PROGRESS NOTES
DISCHARGE  Reason for Discharge: Patient has met all goals.  Patient chooses to discontinue therapy.       12/11/23 0500   PT Goal 1   Goal Identifier HEP   Goal Description Patient will complete his HEP consistently and indpendently to assist in return to functional tasks with decreased discomfort.   Rationale to maximize safety and independence with performance of ADLs and functional tasks;to maximize safety and independence within the home;to maximize safety and independence within the community;to maximize safety and independence with self cares   Goal Progress 12/11/23- reports consistency before trip, used stretching and other exercises as needed over trip   Target Date 12/04/23   Date Met 12/11/23   PT Goal 2   Goal Identifier Standing   Goal Description Patient will tolerate standing without a break for at least 1 hour in order to go on vacation and  line without aggravating symptoms.   Rationale to maximize safety and independence with performance of ADLs and functional tasks;to maximize safety and independence within the home;to maximize safety and independence within the community;to maximize safety and independence with transportation   Goal Progress 12/11/23- improving tolerance, able to complete with minimal discomforty over vacation   Target Date 01/22/23   Date Met 12/11/24   PT Goal 3   Goal Identifier Stairs/Ladder   Goal Description Patient will tolerating climbing/descending a full flight of stairs or a ladder without increased knee pain in order to improve his tolerance to functional acitivity.   Rationale to maximize safety and independence within the community;to maximize safety and independence with transportation;to maximize safety and independence within the home   Goal Progress 12/11/23- improving at home but dependent on the day   Target Date 01/22/23   Date Met 12/11/24         Discharge Plan: Patient to continue home program independently prn.    Referring Provider:  Anupam  Delmy

## 2024-03-21 ENCOUNTER — VIRTUAL VISIT (OUTPATIENT)
Dept: SLEEP MEDICINE | Facility: CLINIC | Age: 69
End: 2024-03-21
Payer: COMMERCIAL

## 2024-03-21 VITALS — WEIGHT: 225 LBS | HEIGHT: 70 IN | BODY MASS INDEX: 32.21 KG/M2

## 2024-03-21 DIAGNOSIS — G47.33 OSA (OBSTRUCTIVE SLEEP APNEA): Primary | ICD-10-CM

## 2024-03-21 PROCEDURE — 99213 OFFICE O/P EST LOW 20 MIN: CPT | Mod: 95 | Performed by: INTERNAL MEDICINE

## 2024-03-21 ASSESSMENT — SLEEP AND FATIGUE QUESTIONNAIRES
HOW LIKELY ARE YOU TO NOD OFF OR FALL ASLEEP WHILE WATCHING TV: WOULD NEVER DOZE
HOW LIKELY ARE YOU TO NOD OFF OR FALL ASLEEP WHILE SITTING INACTIVE IN A PUBLIC PLACE: WOULD NEVER DOZE
HOW LIKELY ARE YOU TO NOD OFF OR FALL ASLEEP WHILE SITTING QUIETLY AFTER LUNCH WITHOUT ALCOHOL: WOULD NEVER DOZE
HOW LIKELY ARE YOU TO NOD OFF OR FALL ASLEEP WHILE LYING DOWN TO REST IN THE AFTERNOON WHEN CIRCUMSTANCES PERMIT: SLIGHT CHANCE OF DOZING
HOW LIKELY ARE YOU TO NOD OFF OR FALL ASLEEP WHEN YOU ARE A PASSENGER IN A CAR FOR AN HOUR WITHOUT A BREAK: WOULD NEVER DOZE
HOW LIKELY ARE YOU TO NOD OFF OR FALL ASLEEP WHILE SITTING AND READING: WOULD NEVER DOZE
HOW LIKELY ARE YOU TO NOD OFF OR FALL ASLEEP IN A CAR, WHILE STOPPED FOR A FEW MINUTES IN TRAFFIC: WOULD NEVER DOZE
HOW LIKELY ARE YOU TO NOD OFF OR FALL ASLEEP WHILE SITTING AND TALKING TO SOMEONE: WOULD NEVER DOZE

## 2024-03-21 ASSESSMENT — PAIN SCALES - GENERAL: PAINLEVEL: NO PAIN (0)

## 2024-03-21 NOTE — PROGRESS NOTES
Virtual Visit Details    Type of service:  Video Visit     Originating Location (pt. Location): Home    Distant Location (provider location):  On-site  Platform used for Video Visit: Bernabe      Obstructive Sleep Apnea - PAP Follow-Up Visit:    Chief Complaint   Patient presents with    MYRA Caldera Antonio comes in today for follow-up of their severe sleep apnea, managed with CPAP.     PSG on 10/10/2007 at a weight of 225 pounds showed an apnea-hypopnea index of 51/h. REM AHI was 60.1 per hour, supien AHI was 53.4 and non-supine AHI 34.3.     Do you use a CPAP Machine at home: Yes  Overall, on a scale of 0-10 how would you rate your CPAP (0 poor, 10 great): 9  Is your mask comfortable: Yes  If not, why:    Is you mask leaking: No  If yes, where do you feel it:    How many night per week does the mask leak (0-7):    Do you notice snoring with mask on: No  Do you notice gasping arousals with mask on: No  Are you having significant oral or nasal dryness: No  Is the pressure setting comfortable: Yes  In not, why:    What type of mask do you use: Nasal Mask  What is your typical bedtime: 10 pm  How long does it take you to go to sleep on PAP therapy: 5 minutes  What time do you typically get out of bed for the day: 6-7 am  How many hours on average per night are you using PAP therapy: 8-9  How many hours are you sleeping per night: 8-9  Do you feel well rested in the morning: Yes    ResMed     Auto-PAP 8-14 cmH2O download:  26/30 total days of use. 4 nonuse days. 87% days with >4 hours use.  Average use 8 hours 30 minutes per day. Median Leak 0.3 L/min. 95%ile Leak 19.5 L/min. CPAP 95% pressure 13.1 cm. AHI 0.4      EPWORTH SLEEPINESS SCALE         3/21/2024     4:07 PM    [place] Sleepiness Scale ( EVIE Castaneda  6614-9830<br>ESS - [place]/English - Final version - 21 Nov 07 - [facility] [employer].)   Sitting and reading Would never doze   Watching TV Would never doze   Sitting, inactive in a public place (e.g. a  "theatre or a meeting) Would never doze   As a passenger in a car for an hour without a break Would never doze   Lying down to rest in the afternoon when circumstances permit Slight chance of dozing   Sitting and talking to someone Would never doze   Sitting quietly after a lunch without alcohol Would never doze   In a car, while stopped for a few minutes in traffic Would never doze   Burton Score (MC) 1   Burton Score (Sleep) 1       INSOMNIA SEVERITY INDEX (KEISHA)          3/19/2024    10:13 AM   Insomnia Severity Index (KEISHA)   Difficulty falling asleep 0   Difficulty staying asleep 1   Problems waking up too early 1   How SATISFIED/DISSATISFIED are you with your CURRENT sleep pattern? 1   How NOTICEABLE to others do you think your sleep problem is in terms of impairing the quality of your life? 1   How WORRIED/DISTRESSED are you about your current sleep problem? 0   To what extent do you consider your sleep problem to INTERFERE with your daily functioning (e.g. daytime fatigue, mood, ability to function at work/daily chores, concentration, memory, mood, etc.) CURRENTLY? 0   KEISHA Total Score 4       Guidelines for Scoring/Interpretation:  Total score categories:  0-7 = No clinically significant insomnia   8-14 = Subthreshold insomnia   15-21 = Clinical insomnia (moderate severity)  22-28 = Clinical insomnia (severe)  Used via courtesy of www.[employer].va.gov with permission from Tone Cruz PhD., [facility]    Ht 1.778 m (5' 10\")   Wt 102.1 kg (225 lb)   BMI 32.28 kg/m      Impression/Plan:     Severe obstructive sleep apnea.    - Patient is using CPAP regularly and continuing to benefit from therapy. I reviewed download data and graphs from his device for last 30 days. Regular compliance and normal residual AHI is demonstrated, confirming adequate treatment of sleep apnea.     Plan:     Continue auto PAP therapy 8-14 cm H2O    Werner Alvarez will follow up in about 1 year(s).     Prosper Link, " MD    CC:  Barron Seo,

## 2024-03-21 NOTE — NURSING NOTE
Is the patient currently in the state of [place]? YES    Visit mode:VIDEO    If the visit is dropped, the patient can be reconnected by: VIDEO VISIT: Send to e-mail at: [email or web address]    Will anyone else be joining the visit? NO  (If patient encounters technical issues they should call [phone]    How would you like to obtain your AVS? MyChart    Are changes needed to the allergy or medication list? Pt stated no changes to allergies and Pt stated no med changes    Reason for visit: RECHECK  Has patient had flu shot for current/most recent flu season? If so, when? Yes: 10/31/2023    Luana CLIFFORD

## 2024-03-21 NOTE — PATIENT INSTRUCTIONS
Your Body mass index is 32.28 kg/m .  Weight management is a personal decision.  If you are interested in exploring weight loss strategies, the following discussion covers the approaches that may be successful. Body mass index (BMI) is one way to tell whether you are at a healthy weight, overweight, or obese. It measures your weight in relation to your height.  A BMI of 18.5 to 24.9 is in the healthy range. A person with a BMI of 25 to 29.9 is considered overweight, and someone with a BMI of 30 or greater is considered obese. More than two-thirds of [place] adults are considered overweight or obese.  Being overweight or obese increases the risk for further weight gain. Excess weight may lead to heart disease and diabetes.  Creating and following plans for healthy eating and physical activity may help you improve your health.  Weight control is part of healthy lifestyle and includes exercise, emotional health, and healthy eating habits. Careful eating habits lifelong are the mainstay of weight control. Though there are significant health benefits from weight loss, long-term weight loss with diet alone may be very difficult to achieve- studies show long-term success with dietary management in less than 10% of people. Attaining a healthy weight may be especially difficult to achieve in those with severe obesity. In some cases, medications, devices and surgical management might be considered.  What can you do?  If you are overweight or obese and are interested in methods for weight loss, you should discuss this with your provider.   Consider reducing daily calorie intake by 500 calories.   Keep a food journal.   Avoiding skipping meals, consider cutting portions instead.    Diet combined with exercise helps maintain muscle while optimizing fat loss. Strength training is particularly important for building and maintaining muscle mass. Exercise helps reduce stress, increase energy, and improves fitness. Increasing  exercise without diet control, however, may not burn enough calories to loose weight.     Start walking three days a week 10-20 minutes at a time  Work towards walking thirty minutes five days a week   Eventually, increase the speed of your walking for 1-2 minutes at time    In addition, we recommend that you review healthy lifestyles and methods for weight loss available through the [employer] patient information sites:  http://win.niddk.nih.gov/publications/index.htm    And look into health and wellness programs that may be available through your health insurance provider, employer, local community center, or [employer].

## 2024-11-13 ENCOUNTER — PATIENT OUTREACH (OUTPATIENT)
Dept: CARE COORDINATION | Facility: CLINIC | Age: 69
End: 2024-11-13
Payer: COMMERCIAL

## 2024-11-27 ENCOUNTER — PATIENT OUTREACH (OUTPATIENT)
Dept: CARE COORDINATION | Facility: CLINIC | Age: 69
End: 2024-11-27
Payer: COMMERCIAL

## 2025-02-09 ENCOUNTER — HEALTH MAINTENANCE LETTER (OUTPATIENT)
Age: 70
End: 2025-02-09

## 2025-03-31 ENCOUNTER — TELEPHONE (OUTPATIENT)
Dept: INTERNAL MEDICINE | Facility: CLINIC | Age: 70
End: 2025-03-31
Payer: COMMERCIAL

## 2025-03-31 DIAGNOSIS — G47.33 OSA (OBSTRUCTIVE SLEEP APNEA): Primary | ICD-10-CM

## 2025-04-03 NOTE — TELEPHONE ENCOUNTER
Last ov 3/21/24  Next ov 7/25/25    Patient requesting updated order for CPAP supplies prior to appointment. Order pended and routed to provider for consideration.    GODWIN PINEDO RN  [facility]